# Patient Record
Sex: MALE | Race: WHITE | NOT HISPANIC OR LATINO | Employment: OTHER | URBAN - METROPOLITAN AREA
[De-identification: names, ages, dates, MRNs, and addresses within clinical notes are randomized per-mention and may not be internally consistent; named-entity substitution may affect disease eponyms.]

---

## 2017-03-30 ENCOUNTER — APPOINTMENT (OUTPATIENT)
Dept: LAB | Facility: CLINIC | Age: 82
End: 2017-03-30
Payer: MEDICARE

## 2017-03-30 ENCOUNTER — TRANSCRIBE ORDERS (OUTPATIENT)
Dept: LAB | Facility: CLINIC | Age: 82
End: 2017-03-30

## 2017-03-30 DIAGNOSIS — E78.5 HYPERLIPIDEMIA: ICD-10-CM

## 2017-03-30 DIAGNOSIS — E83.42 HYPOMAGNESEMIA: ICD-10-CM

## 2017-03-30 DIAGNOSIS — I10 ESSENTIAL (PRIMARY) HYPERTENSION: ICD-10-CM

## 2017-03-30 DIAGNOSIS — E11.9 TYPE 2 DIABETES MELLITUS WITHOUT COMPLICATIONS (HCC): ICD-10-CM

## 2017-03-30 LAB
ALBUMIN SERPL BCP-MCNC: 2.9 G/DL (ref 3.5–5)
ALP SERPL-CCNC: 79 U/L (ref 46–116)
ALT SERPL W P-5'-P-CCNC: 15 U/L (ref 12–78)
ANION GAP SERPL CALCULATED.3IONS-SCNC: 6 MMOL/L (ref 4–13)
AST SERPL W P-5'-P-CCNC: 12 U/L (ref 5–45)
BASOPHILS # BLD AUTO: 0.05 THOUSANDS/ΜL (ref 0–0.1)
BASOPHILS NFR BLD AUTO: 1 % (ref 0–1)
BILIRUB DIRECT SERPL-MCNC: 0.23 MG/DL (ref 0–0.2)
BILIRUB SERPL-MCNC: 0.81 MG/DL (ref 0.2–1)
BUN SERPL-MCNC: 20 MG/DL (ref 5–25)
CALCIUM SERPL-MCNC: 8.8 MG/DL (ref 8.3–10.1)
CHLORIDE SERPL-SCNC: 102 MMOL/L (ref 100–108)
CHOLEST SERPL-MCNC: 115 MG/DL (ref 50–200)
CO2 SERPL-SCNC: 30 MMOL/L (ref 21–32)
CREAT SERPL-MCNC: 1.11 MG/DL (ref 0.6–1.3)
EOSINOPHIL # BLD AUTO: 0.1 THOUSAND/ΜL (ref 0–0.61)
EOSINOPHIL NFR BLD AUTO: 2 % (ref 0–6)
ERYTHROCYTE [DISTWIDTH] IN BLOOD BY AUTOMATED COUNT: 14.7 % (ref 11.6–15.1)
EST. AVERAGE GLUCOSE BLD GHB EST-MCNC: 134 MG/DL
GFR SERPL CREATININE-BSD FRML MDRD: >60 ML/MIN/1.73SQ M
GLUCOSE P FAST SERPL-MCNC: 99 MG/DL (ref 65–99)
HBA1C MFR BLD: 6.3 % (ref 4.2–6.3)
HCT VFR BLD AUTO: 46.8 % (ref 36.5–49.3)
HDLC SERPL-MCNC: 55 MG/DL (ref 40–60)
HGB BLD-MCNC: 14.8 G/DL (ref 12–17)
LDLC SERPL CALC-MCNC: 50 MG/DL (ref 0–100)
LYMPHOCYTES # BLD AUTO: 1.59 THOUSANDS/ΜL (ref 0.6–4.47)
LYMPHOCYTES NFR BLD AUTO: 23 % (ref 14–44)
MAGNESIUM SERPL-MCNC: 2 MG/DL (ref 1.6–2.6)
MCH RBC QN AUTO: 25.5 PG (ref 26.8–34.3)
MCHC RBC AUTO-ENTMCNC: 31.6 G/DL (ref 31.4–37.4)
MCV RBC AUTO: 81 FL (ref 82–98)
MONOCYTES # BLD AUTO: 0.46 THOUSAND/ΜL (ref 0.17–1.22)
MONOCYTES NFR BLD AUTO: 7 % (ref 4–12)
NEUTROPHILS # BLD AUTO: 4.68 THOUSANDS/ΜL (ref 1.85–7.62)
NEUTS SEG NFR BLD AUTO: 67 % (ref 43–75)
NRBC BLD AUTO-RTO: 0 /100 WBCS
PLATELET # BLD AUTO: 171 THOUSANDS/UL (ref 149–390)
PMV BLD AUTO: 9.9 FL (ref 8.9–12.7)
POTASSIUM SERPL-SCNC: 4.2 MMOL/L (ref 3.5–5.3)
PROT SERPL-MCNC: 6.8 G/DL (ref 6.4–8.2)
RBC # BLD AUTO: 5.8 MILLION/UL (ref 3.88–5.62)
SODIUM SERPL-SCNC: 138 MMOL/L (ref 136–145)
TRIGL SERPL-MCNC: 51 MG/DL
WBC # BLD AUTO: 6.89 THOUSAND/UL (ref 4.31–10.16)

## 2017-03-30 PROCEDURE — 80061 LIPID PANEL: CPT

## 2017-03-30 PROCEDURE — 80076 HEPATIC FUNCTION PANEL: CPT

## 2017-03-30 PROCEDURE — 36415 COLL VENOUS BLD VENIPUNCTURE: CPT

## 2017-03-30 PROCEDURE — 85025 COMPLETE CBC W/AUTO DIFF WBC: CPT

## 2017-03-30 PROCEDURE — 83036 HEMOGLOBIN GLYCOSYLATED A1C: CPT

## 2017-03-30 PROCEDURE — 83735 ASSAY OF MAGNESIUM: CPT

## 2017-03-30 PROCEDURE — 80048 BASIC METABOLIC PNL TOTAL CA: CPT

## 2017-04-06 ENCOUNTER — ALLSCRIPTS OFFICE VISIT (OUTPATIENT)
Dept: OTHER | Facility: OTHER | Age: 82
End: 2017-04-06

## 2017-05-15 ENCOUNTER — GENERIC CONVERSION - ENCOUNTER (OUTPATIENT)
Dept: OTHER | Facility: OTHER | Age: 82
End: 2017-05-15

## 2017-05-23 ENCOUNTER — GENERIC CONVERSION - ENCOUNTER (OUTPATIENT)
Dept: OTHER | Facility: OTHER | Age: 82
End: 2017-05-23

## 2017-05-31 ENCOUNTER — GENERIC CONVERSION - ENCOUNTER (OUTPATIENT)
Dept: OTHER | Facility: OTHER | Age: 82
End: 2017-05-31

## 2017-06-01 ENCOUNTER — ALLSCRIPTS OFFICE VISIT (OUTPATIENT)
Dept: OTHER | Facility: OTHER | Age: 82
End: 2017-06-01

## 2017-06-05 ENCOUNTER — ALLSCRIPTS OFFICE VISIT (OUTPATIENT)
Dept: OTHER | Facility: OTHER | Age: 82
End: 2017-06-05

## 2017-06-12 ENCOUNTER — ALLSCRIPTS OFFICE VISIT (OUTPATIENT)
Dept: OTHER | Facility: OTHER | Age: 82
End: 2017-06-12

## 2017-08-06 DIAGNOSIS — E78.5 HYPERLIPIDEMIA: ICD-10-CM

## 2017-08-06 DIAGNOSIS — I10 ESSENTIAL (PRIMARY) HYPERTENSION: ICD-10-CM

## 2017-08-06 DIAGNOSIS — E83.42 HYPOMAGNESEMIA: ICD-10-CM

## 2017-08-06 DIAGNOSIS — D50.9 IRON DEFICIENCY ANEMIA: ICD-10-CM

## 2017-08-06 DIAGNOSIS — E11.9 TYPE 2 DIABETES MELLITUS WITHOUT COMPLICATIONS (HCC): ICD-10-CM

## 2017-09-18 ENCOUNTER — GENERIC CONVERSION - ENCOUNTER (OUTPATIENT)
Dept: OTHER | Facility: OTHER | Age: 82
End: 2017-09-18

## 2017-09-26 ENCOUNTER — TRANSCRIBE ORDERS (OUTPATIENT)
Dept: LAB | Facility: CLINIC | Age: 82
End: 2017-09-26

## 2017-09-26 ENCOUNTER — APPOINTMENT (OUTPATIENT)
Dept: LAB | Facility: CLINIC | Age: 82
End: 2017-09-26
Payer: MEDICARE

## 2017-09-26 DIAGNOSIS — E78.5 HYPERLIPIDEMIA: ICD-10-CM

## 2017-09-26 DIAGNOSIS — D50.9 IRON DEFICIENCY ANEMIA: ICD-10-CM

## 2017-09-26 DIAGNOSIS — I10 ESSENTIAL (PRIMARY) HYPERTENSION: ICD-10-CM

## 2017-09-26 DIAGNOSIS — E11.9 TYPE 2 DIABETES MELLITUS WITHOUT COMPLICATIONS (HCC): ICD-10-CM

## 2017-09-26 DIAGNOSIS — E83.42 HYPOMAGNESEMIA: ICD-10-CM

## 2017-09-26 LAB
ALBUMIN SERPL BCP-MCNC: 2.3 G/DL (ref 3.5–5)
ALP SERPL-CCNC: 92 U/L (ref 46–116)
ALT SERPL W P-5'-P-CCNC: 16 U/L (ref 12–78)
ANION GAP SERPL CALCULATED.3IONS-SCNC: 7 MMOL/L (ref 4–13)
AST SERPL W P-5'-P-CCNC: 19 U/L (ref 5–45)
BASOPHILS # BLD AUTO: 0.01 THOUSANDS/ΜL (ref 0–0.1)
BASOPHILS NFR BLD AUTO: 0 % (ref 0–1)
BILIRUB DIRECT SERPL-MCNC: 0.25 MG/DL (ref 0–0.2)
BILIRUB SERPL-MCNC: 0.64 MG/DL (ref 0.2–1)
BUN SERPL-MCNC: 13 MG/DL (ref 5–25)
CALCIUM SERPL-MCNC: 8.8 MG/DL (ref 8.3–10.1)
CHLORIDE SERPL-SCNC: 102 MMOL/L (ref 100–108)
CHOLEST SERPL-MCNC: 84 MG/DL (ref 50–200)
CO2 SERPL-SCNC: 28 MMOL/L (ref 21–32)
CREAT SERPL-MCNC: 1.19 MG/DL (ref 0.6–1.3)
EOSINOPHIL # BLD AUTO: 0.05 THOUSAND/ΜL (ref 0–0.61)
EOSINOPHIL NFR BLD AUTO: 1 % (ref 0–6)
ERYTHROCYTE [DISTWIDTH] IN BLOOD BY AUTOMATED COUNT: 15.2 % (ref 11.6–15.1)
EST. AVERAGE GLUCOSE BLD GHB EST-MCNC: 134 MG/DL
FERRITIN SERPL-MCNC: 81 NG/ML (ref 8–388)
GFR SERPL CREATININE-BSD FRML MDRD: 57 ML/MIN/1.73SQ M
GLUCOSE P FAST SERPL-MCNC: 106 MG/DL (ref 65–99)
HBA1C MFR BLD: 6.3 % (ref 4.2–6.3)
HCT VFR BLD AUTO: 41.6 % (ref 36.5–49.3)
HDLC SERPL-MCNC: 35 MG/DL (ref 40–60)
HGB BLD-MCNC: 13 G/DL (ref 12–17)
IRON SERPL-MCNC: 20 UG/DL (ref 65–175)
LDLC SERPL CALC-MCNC: 37 MG/DL (ref 0–100)
LYMPHOCYTES # BLD AUTO: 1.27 THOUSANDS/ΜL (ref 0.6–4.47)
LYMPHOCYTES NFR BLD AUTO: 23 % (ref 14–44)
MAGNESIUM SERPL-MCNC: 1.7 MG/DL (ref 1.6–2.6)
MCH RBC QN AUTO: 23.9 PG (ref 26.8–34.3)
MCHC RBC AUTO-ENTMCNC: 31.3 G/DL (ref 31.4–37.4)
MCV RBC AUTO: 77 FL (ref 82–98)
MONOCYTES # BLD AUTO: 0.46 THOUSAND/ΜL (ref 0.17–1.22)
MONOCYTES NFR BLD AUTO: 8 % (ref 4–12)
NEUTROPHILS # BLD AUTO: 3.83 THOUSANDS/ΜL (ref 1.85–7.62)
NEUTS SEG NFR BLD AUTO: 68 % (ref 43–75)
NRBC BLD AUTO-RTO: 0 /100 WBCS
PLATELET # BLD AUTO: 273 THOUSANDS/UL (ref 149–390)
PMV BLD AUTO: 9.6 FL (ref 8.9–12.7)
POTASSIUM SERPL-SCNC: 3.7 MMOL/L (ref 3.5–5.3)
PROT SERPL-MCNC: 7.4 G/DL (ref 6.4–8.2)
RBC # BLD AUTO: 5.44 MILLION/UL (ref 3.88–5.62)
SODIUM SERPL-SCNC: 137 MMOL/L (ref 136–145)
TRIGL SERPL-MCNC: 60 MG/DL
WBC # BLD AUTO: 5.65 THOUSAND/UL (ref 4.31–10.16)

## 2017-09-26 PROCEDURE — 83540 ASSAY OF IRON: CPT

## 2017-09-26 PROCEDURE — 85025 COMPLETE CBC W/AUTO DIFF WBC: CPT

## 2017-09-26 PROCEDURE — 36415 COLL VENOUS BLD VENIPUNCTURE: CPT

## 2017-09-26 PROCEDURE — 82728 ASSAY OF FERRITIN: CPT

## 2017-09-26 PROCEDURE — 83036 HEMOGLOBIN GLYCOSYLATED A1C: CPT

## 2017-09-26 PROCEDURE — 80048 BASIC METABOLIC PNL TOTAL CA: CPT

## 2017-09-26 PROCEDURE — 80076 HEPATIC FUNCTION PANEL: CPT

## 2017-09-26 PROCEDURE — 83735 ASSAY OF MAGNESIUM: CPT

## 2017-09-26 PROCEDURE — 80061 LIPID PANEL: CPT

## 2017-09-28 ENCOUNTER — ALLSCRIPTS OFFICE VISIT (OUTPATIENT)
Dept: OTHER | Facility: OTHER | Age: 82
End: 2017-09-28

## 2017-11-29 ENCOUNTER — APPOINTMENT (EMERGENCY)
Dept: CT IMAGING | Facility: HOSPITAL | Age: 82
DRG: 564 | End: 2017-11-29
Payer: MEDICARE

## 2017-11-29 ENCOUNTER — HOSPITAL ENCOUNTER (INPATIENT)
Facility: HOSPITAL | Age: 82
LOS: 5 days | Discharge: NURSING, REHAB OR LONG-TERM CARE | DRG: 564 | End: 2017-12-04
Attending: EMERGENCY MEDICINE | Admitting: GENERAL PRACTICE
Payer: MEDICARE

## 2017-11-29 ENCOUNTER — APPOINTMENT (INPATIENT)
Dept: RADIOLOGY | Facility: HOSPITAL | Age: 82
DRG: 564 | End: 2017-11-29
Payer: MEDICARE

## 2017-11-29 ENCOUNTER — GENERIC CONVERSION - ENCOUNTER (OUTPATIENT)
Dept: OTHER | Facility: OTHER | Age: 82
End: 2017-11-29
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
## 2017-11-29 DIAGNOSIS — M62.82 RHABDOMYOLYSIS: ICD-10-CM

## 2017-11-29 DIAGNOSIS — Z79.01 ANTICOAGULANT LONG-TERM USE: ICD-10-CM

## 2017-11-29 DIAGNOSIS — I87.2 VENOUS STASIS DERMATITIS OF LEFT LOWER EXTREMITY: Chronic | ICD-10-CM

## 2017-11-29 DIAGNOSIS — R94.31 ABNORMAL EKG: ICD-10-CM

## 2017-11-29 DIAGNOSIS — S51.812A SKIN TEAR OF LEFT FOREARM WITHOUT COMPLICATION, INITIAL ENCOUNTER: ICD-10-CM

## 2017-11-29 DIAGNOSIS — R93.89 ABNORMAL CT OF THE CHEST: ICD-10-CM

## 2017-11-29 DIAGNOSIS — R79.89 ELEVATED TSH: ICD-10-CM

## 2017-11-29 DIAGNOSIS — E80.6 HYPERBILIRUBINEMIA: ICD-10-CM

## 2017-11-29 DIAGNOSIS — T07.XXXA CONTUSION, MULTIPLE SITES: ICD-10-CM

## 2017-11-29 DIAGNOSIS — R77.8 ELEVATED TROPONIN: ICD-10-CM

## 2017-11-29 DIAGNOSIS — S60.511A ABRASION OF RIGHT HAND, INITIAL ENCOUNTER: ICD-10-CM

## 2017-11-29 DIAGNOSIS — I21.4 NSTEMI (NON-ST ELEVATED MYOCARDIAL INFARCTION) (HCC): ICD-10-CM

## 2017-11-29 DIAGNOSIS — R93.89 ABNORMAL CXR: ICD-10-CM

## 2017-11-29 DIAGNOSIS — N17.9 AKI (ACUTE KIDNEY INJURY) (HCC): ICD-10-CM

## 2017-11-29 PROBLEM — N40.0 BPH (BENIGN PROSTATIC HYPERPLASIA): Chronic | Status: ACTIVE | Noted: 2017-11-29

## 2017-11-29 PROBLEM — R06.02 SHORTNESS OF BREATH: Status: ACTIVE | Noted: 2017-11-29

## 2017-11-29 PROBLEM — R05.9 COUGH: Status: ACTIVE | Noted: 2017-11-29

## 2017-11-29 PROBLEM — E11.9 TYPE 2 DIABETES MELLITUS (HCC): Chronic | Status: ACTIVE | Noted: 2017-11-29

## 2017-11-29 PROBLEM — K21.9 GERD (GASTROESOPHAGEAL REFLUX DISEASE): Chronic | Status: ACTIVE | Noted: 2017-11-29

## 2017-11-29 PROBLEM — M81.0 OSTEOPOROSIS: Chronic | Status: ACTIVE | Noted: 2017-11-29
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
## 2017-11-29 PROBLEM — N18.9 CKD (CHRONIC KIDNEY DISEASE): Chronic | Status: ACTIVE | Noted: 2017-11-29

## 2017-11-29 PROBLEM — I48.91 ATRIAL FIBRILLATION (HCC): Chronic | Status: ACTIVE | Noted: 2017-11-29

## 2017-11-29 PROBLEM — I50.9 CONGESTIVE CARDIAC FAILURE (HCC): Chronic | Status: ACTIVE | Noted: 2017-11-29

## 2017-11-29 PROBLEM — J44.9 COPD (CHRONIC OBSTRUCTIVE PULMONARY DISEASE) (HCC): Status: ACTIVE | Noted: 2017-11-29

## 2017-11-29 LAB
ALBUMIN SERPL BCP-MCNC: 2.6 G/DL (ref 3.5–5)
ALP SERPL-CCNC: 73 U/L (ref 46–116)
ALT SERPL W P-5'-P-CCNC: 24 U/L (ref 12–78)
ANION GAP SERPL CALCULATED.3IONS-SCNC: 12 MMOL/L (ref 4–13)
APTT PPP: 37 SECONDS (ref 23–35)
AST SERPL W P-5'-P-CCNC: 82 U/L (ref 5–45)
BASOPHILS # BLD AUTO: 0.05 THOUSANDS/ΜL (ref 0–0.1)
BASOPHILS NFR BLD AUTO: 0 % (ref 0–1)
BILIRUB DIRECT SERPL-MCNC: 0.56 MG/DL (ref 0–0.2)
BILIRUB SERPL-MCNC: 2.5 MG/DL (ref 0.2–1)
BUN SERPL-MCNC: 24 MG/DL (ref 5–25)
CALCIUM SERPL-MCNC: 9.1 MG/DL (ref 8.3–10.1)
CHLORIDE SERPL-SCNC: 103 MMOL/L (ref 100–108)
CK MB SERPL-MCNC: 19.9 NG/ML (ref 0–5)
CK MB SERPL-MCNC: <1 % (ref 0–2.5)
CK SERPL-CCNC: 2573 U/L (ref 39–308)
CO2 SERPL-SCNC: 25 MMOL/L (ref 21–32)
CREAT SERPL-MCNC: 1.62 MG/DL (ref 0.6–1.3)
EOSINOPHIL # BLD AUTO: 0 THOUSAND/ΜL (ref 0–0.61)
EOSINOPHIL NFR BLD AUTO: 0 % (ref 0–6)
ERYTHROCYTE [DISTWIDTH] IN BLOOD BY AUTOMATED COUNT: 18.6 % (ref 11.6–15.1)
GFR SERPL CREATININE-BSD FRML MDRD: 39 ML/MIN/1.73SQ M
GLUCOSE SERPL-MCNC: 167 MG/DL (ref 65–140)
GLUCOSE SERPL-MCNC: 78 MG/DL (ref 65–140)
GLUCOSE SERPL-MCNC: 93 MG/DL (ref 65–140)
HCT VFR BLD AUTO: 45.8 % (ref 36.5–49.3)
HGB BLD-MCNC: 14.2 G/DL (ref 12–17)
INR PPP: 1.8 (ref 0.86–1.16)
LYMPHOCYTES # BLD AUTO: 1.02 THOUSANDS/ΜL (ref 0.6–4.47)
LYMPHOCYTES NFR BLD AUTO: 8 % (ref 14–44)
MAGNESIUM SERPL-MCNC: 1.7 MG/DL (ref 1.6–2.6)
MCH RBC QN AUTO: 22.9 PG (ref 26.8–34.3)
MCHC RBC AUTO-ENTMCNC: 31 G/DL (ref 31.4–37.4)
MCV RBC AUTO: 74 FL (ref 82–98)
MONOCYTES # BLD AUTO: 0.52 THOUSAND/ΜL (ref 0.17–1.22)
MONOCYTES NFR BLD AUTO: 4 % (ref 4–12)
NEUTROPHILS # BLD AUTO: 11.28 THOUSANDS/ΜL (ref 1.85–7.62)
NEUTS SEG NFR BLD AUTO: 87 % (ref 43–75)
NRBC BLD AUTO-RTO: 0 /100 WBCS
PLATELET # BLD AUTO: 175 THOUSANDS/UL (ref 149–390)
PLATELET # BLD AUTO: 186 THOUSANDS/UL (ref 149–390)
PMV BLD AUTO: 9.5 FL (ref 8.9–12.7)
PMV BLD AUTO: 9.8 FL (ref 8.9–12.7)
POTASSIUM SERPL-SCNC: 4 MMOL/L (ref 3.5–5.3)
PROT SERPL-MCNC: 7.1 G/DL (ref 6.4–8.2)
PROTHROMBIN TIME: 21.4 SECONDS (ref 12.1–14.4)
RBC # BLD AUTO: 6.2 MILLION/UL (ref 3.88–5.62)
SODIUM SERPL-SCNC: 140 MMOL/L (ref 136–145)
T3 SERPL-MCNC: 0.5 NG/ML (ref 0.6–1.8)
T4 FREE SERPL-MCNC: 1.02 NG/DL (ref 0.76–1.46)
TROPONIN I SERPL-MCNC: 3.02 NG/ML
TROPONIN I SERPL-MCNC: 3.58 NG/ML
TROPONIN I SERPL-MCNC: 3.62 NG/ML
TSH SERPL DL<=0.05 MIU/L-ACNC: 6.75 UIU/ML (ref 0.36–3.74)
WBC # BLD AUTO: 12.93 THOUSAND/UL (ref 4.31–10.16)

## 2017-11-29 PROCEDURE — 82550 ASSAY OF CK (CPK): CPT | Performed by: EMERGENCY MEDICINE

## 2017-11-29 PROCEDURE — 71020 HB CHEST X-RAY 2VW FRONTAL&LATL: CPT

## 2017-11-29 PROCEDURE — 99285 EMERGENCY DEPT VISIT HI MDM: CPT

## 2017-11-29 PROCEDURE — 70450 CT HEAD/BRAIN W/O DYE: CPT

## 2017-11-29 PROCEDURE — 84484 ASSAY OF TROPONIN QUANT: CPT | Performed by: NURSE PRACTITIONER

## 2017-11-29 PROCEDURE — 36415 COLL VENOUS BLD VENIPUNCTURE: CPT | Performed by: EMERGENCY MEDICINE

## 2017-11-29 PROCEDURE — 80048 BASIC METABOLIC PNL TOTAL CA: CPT | Performed by: EMERGENCY MEDICINE

## 2017-11-29 PROCEDURE — 85730 THROMBOPLASTIN TIME PARTIAL: CPT | Performed by: EMERGENCY MEDICINE

## 2017-11-29 PROCEDURE — 85025 COMPLETE CBC W/AUTO DIFF WBC: CPT | Performed by: EMERGENCY MEDICINE

## 2017-11-29 PROCEDURE — 82948 REAGENT STRIP/BLOOD GLUCOSE: CPT

## 2017-11-29 PROCEDURE — 80076 HEPATIC FUNCTION PANEL: CPT | Performed by: EMERGENCY MEDICINE

## 2017-11-29 PROCEDURE — 84439 ASSAY OF FREE THYROXINE: CPT | Performed by: EMERGENCY MEDICINE

## 2017-11-29 PROCEDURE — 84484 ASSAY OF TROPONIN QUANT: CPT | Performed by: EMERGENCY MEDICINE

## 2017-11-29 PROCEDURE — 82553 CREATINE MB FRACTION: CPT | Performed by: EMERGENCY MEDICINE

## 2017-11-29 PROCEDURE — 72125 CT NECK SPINE W/O DYE: CPT

## 2017-11-29 PROCEDURE — 85610 PROTHROMBIN TIME: CPT | Performed by: EMERGENCY MEDICINE

## 2017-11-29 PROCEDURE — 83735 ASSAY OF MAGNESIUM: CPT | Performed by: EMERGENCY MEDICINE

## 2017-11-29 PROCEDURE — 85049 AUTOMATED PLATELET COUNT: CPT | Performed by: NURSE PRACTITIONER

## 2017-11-29 PROCEDURE — 84480 ASSAY TRIIODOTHYRONINE (T3): CPT | Performed by: EMERGENCY MEDICINE

## 2017-11-29 PROCEDURE — 84443 ASSAY THYROID STIM HORMONE: CPT | Performed by: EMERGENCY MEDICINE

## 2017-11-29 PROCEDURE — 93005 ELECTROCARDIOGRAM TRACING: CPT

## 2017-11-29 RX ORDER — FUROSEMIDE 40 MG/1
40 TABLET ORAL DAILY
Status: DISCONTINUED | OUTPATIENT
Start: 2017-11-30 | End: 2017-12-04 | Stop reason: HOSPADM

## 2017-11-29 RX ORDER — METFORMIN HYDROCHLORIDE 500 MG/1
TABLET, EXTENDED RELEASE ORAL
COMMUNITY
Start: 2016-07-06 | End: 2018-05-03

## 2017-11-29 RX ORDER — TAMSULOSIN HYDROCHLORIDE 0.4 MG/1
CAPSULE ORAL
COMMUNITY
Start: 2014-10-09 | End: 2018-05-03

## 2017-11-29 RX ORDER — FUROSEMIDE 40 MG/1
TABLET ORAL
COMMUNITY
Start: 2017-07-31 | End: 2018-05-03

## 2017-11-29 RX ORDER — TAMSULOSIN HYDROCHLORIDE 0.4 MG/1
0.4 CAPSULE ORAL
Status: DISCONTINUED | OUTPATIENT
Start: 2017-11-29 | End: 2017-12-04 | Stop reason: HOSPADM

## 2017-11-29 RX ORDER — POTASSIUM CHLORIDE 20 MEQ/1
20 TABLET, EXTENDED RELEASE ORAL DAILY
Status: DISCONTINUED | OUTPATIENT
Start: 2017-11-30 | End: 2017-11-30

## 2017-11-29 RX ORDER — METOPROLOL SUCCINATE 25 MG/1
25 TABLET, EXTENDED RELEASE ORAL DAILY
Status: DISCONTINUED | OUTPATIENT
Start: 2017-11-30 | End: 2017-12-04 | Stop reason: HOSPADM

## 2017-11-29 RX ORDER — HEPARIN SODIUM 5000 [USP'U]/ML
5000 INJECTION, SOLUTION INTRAVENOUS; SUBCUTANEOUS EVERY 8 HOURS SCHEDULED
Status: DISCONTINUED | OUTPATIENT
Start: 2017-11-29 | End: 2017-11-29

## 2017-11-29 RX ORDER — LANCETS 28 GAUGE
EACH MISCELLANEOUS
COMMUNITY
Start: 2016-01-15

## 2017-11-29 RX ORDER — NICOTINE POLACRILEX 4 MG/1
GUM, CHEWING ORAL
COMMUNITY
End: 2018-05-03

## 2017-11-29 RX ORDER — SIMVASTATIN 20 MG
TABLET ORAL
COMMUNITY
Start: 2014-10-09 | End: 2018-02-12 | Stop reason: SDUPTHER

## 2017-11-29 RX ORDER — ALENDRONATE SODIUM 70 MG/1
TABLET ORAL
COMMUNITY
Start: 2014-10-20 | End: 2018-03-19 | Stop reason: SDUPTHER

## 2017-11-29 RX ORDER — MAGNESIUM CHLORIDE 71.5 G/G
TABLET ORAL
COMMUNITY
Start: 2016-11-07

## 2017-11-29 RX ORDER — UREA 10 %
250 LOTION (ML) TOPICAL
Status: DISCONTINUED | OUTPATIENT
Start: 2017-11-29 | End: 2017-12-04 | Stop reason: HOSPADM

## 2017-11-29 RX ORDER — PANTOPRAZOLE SODIUM 20 MG/1
20 TABLET, DELAYED RELEASE ORAL
Status: DISCONTINUED | OUTPATIENT
Start: 2017-11-30 | End: 2017-12-04 | Stop reason: HOSPADM

## 2017-11-29 RX ORDER — ACETAMINOPHEN 325 MG/1
650 TABLET ORAL EVERY 6 HOURS PRN
Status: DISCONTINUED | OUTPATIENT
Start: 2017-11-29 | End: 2017-12-04 | Stop reason: HOSPADM

## 2017-11-29 RX ORDER — POTASSIUM CHLORIDE 20 MEQ/1
TABLET, EXTENDED RELEASE ORAL
COMMUNITY
Start: 2015-03-16 | End: 2018-02-08 | Stop reason: SDUPTHER

## 2017-11-29 RX ORDER — METOPROLOL SUCCINATE 25 MG/1
TABLET, EXTENDED RELEASE ORAL
COMMUNITY
Start: 2015-08-17 | End: 2018-05-03

## 2017-11-29 RX ORDER — ASPIRIN 81 MG/1
324 TABLET, CHEWABLE ORAL ONCE
Status: COMPLETED | OUTPATIENT
Start: 2017-11-29 | End: 2017-11-29

## 2017-11-29 RX ORDER — SODIUM CHLORIDE 9 MG/ML
75 INJECTION, SOLUTION INTRAVENOUS CONTINUOUS
Status: DISCONTINUED | OUTPATIENT
Start: 2017-11-29 | End: 2017-11-30

## 2017-11-29 RX ORDER — PRAVASTATIN SODIUM 20 MG
20 TABLET ORAL
Status: DISCONTINUED | OUTPATIENT
Start: 2017-11-29 | End: 2017-12-04 | Stop reason: HOSPADM

## 2017-11-29 RX ORDER — SENNOSIDES 8.6 MG
1 TABLET ORAL
Status: DISCONTINUED | OUTPATIENT
Start: 2017-11-29 | End: 2017-12-04 | Stop reason: HOSPADM

## 2017-11-29 RX ADMIN — Medication 250 MG: at 18:16

## 2017-11-29 RX ADMIN — TAMSULOSIN HYDROCHLORIDE 0.4 MG: 0.4 CAPSULE ORAL at 17:34

## 2017-11-29 RX ADMIN — FLUTICASONE PROPIONATE AND SALMETEROL 1 PUFF: 50; 250 POWDER RESPIRATORY (INHALATION) at 20:19

## 2017-11-29 RX ADMIN — SODIUM CHLORIDE 1000 ML: 0.9 INJECTION, SOLUTION INTRAVENOUS at 14:51

## 2017-11-29 RX ADMIN — SODIUM CHLORIDE 75 ML/HR: 0.9 INJECTION, SOLUTION INTRAVENOUS at 17:34

## 2017-11-29 RX ADMIN — ASPIRIN 81 MG 324 MG: 81 TABLET ORAL at 14:00

## 2017-11-29 RX ADMIN — INSULIN LISPRO 1 UNITS: 100 INJECTION, SOLUTION INTRAVENOUS; SUBCUTANEOUS at 22:07

## 2017-11-29 RX ADMIN — PRAVASTATIN SODIUM 20 MG: 20 TABLET ORAL at 18:15

## 2017-11-29 RX ADMIN — SENNOSIDES 8.6 MG: 8.6 TABLET, FILM COATED ORAL at 22:00

## 2017-11-29 NOTE — CONSULTS
Consultation - Cardiology  Benoit Willis 81 y o  male MRN: [id number]  Unit/Bed#: ED 11 Encounter: [id number]    Inpatient consult to Cardiology  Consult performed by: Roscoe Donovan ordered by: Marcia Bunch          Physician Requesting Consult: Sirena Kinsey*  Reason for Consult / Principal Problem: NSTEMI    HPI: Cardiologist Dr Jessi Kim is a 81y o  year old male who has a history of CAD s/p stents, CVA, PVD, COPD, A  Fib on Xarelto, Dm, CKD2, CHF, HLD, MI presenting with significantly elevated troponin after mechanical fall last night  Patient states that he tripped on a slipper last night, denies pre-syncopal symptoms such as lightheadedness or visual changes  He was on the floor for 10-12 hours, crawling on the floor unable to get to the phone  Patient denies loss of consciousness or head trauma  Patient also admits that the has had progressive SOB and productive cough over the past week and has been weaker and slower over the past month  Denies chest pain or leg swelling  According to his family, he has baseline difficulty with daily household chores  Patient's cardiologist is Dr Audrea Shone, and he hasn't seen him in over a year        REVIEW OF SYSTEMS:  Constitutional:  +generalized weakness, +slowing activity, Denies fever or chills   Eyes:  Denies change in visual acuity   HENT:  Denies nasal congestion or sore throat   Respiratory:  +SOB, +cough  Cardiovascular:  Denies chest pain or edema   GI:  Denies abdominal pain, nausea, vomiting, bloody stools or diarrhea   :  Denies dysuria, frequency, difficulty in micturition and nocturia  Musculoskeletal:  Denies pain  Neurologic:  Denies headache, focal weakness or sensory changes   Endocrine:  Denies polyuria or polydipsia   Lymphatic:  Denies swollen glands   Psychiatric:  Denies depression or anxiety     Historical Information   Past Medical History:   Diagnosis Date    Cardiac disease     [facility]ch[facility] disease) ([facility] )     Diabetes mellitus ([facility] )     GERD (gastroesophageal reflux disease)     Hyperlipidemia     Hypertension     Renal disorder      Past Surgical History:   Procedure Laterality Date    CORONARY ANGIOPLASTY WITH STENT PLACEMENT      EYE SURGERY       History   Alcohol Use No     History   Drug Use No     History   Smoking Status    Former Smoker    Years: 20 00   Smokeless Tobacco    Never Used     Family History: History reviewed  No pertinent family history      MEDS & ALLERGIES:  all current active meds have been reviewed and current meds:   Current Facility-Administered Medications   Medication Dose Route Frequency    acetaminophen (TYLENOL) tablet 650 mg  650 mg Oral Q6H PRN    fluticasone-salmeterol (ADVAIR) 250-50 mcg/dose inhaler 1 puff  1 puff Inhalation Q12H [address]    [START ON 11/30/2017] furosemide (LASIX) tablet 40 mg  40 mg Oral Daily    insulin lispro (HumaLOG) 100 units/mL subcutaneous injection 1-5 Units  1-5 Units Subcutaneous TID AC    insulin lispro (HumaLOG) 100 units/mL subcutaneous injection 1-5 Units  1-5 Units Subcutaneous HS    magnesium gluconate (MAGONATE) tablet 250 mg  250 mg Oral BID AC    [START ON 11/30/2017] metoprolol succinate (TOPROL-XL) 24 hr tablet 25 mg  25 mg Oral Daily    [START ON 11/30/2017] pantoprazole (PROTONIX) EC tablet 20 mg  20 mg Oral Early Morning    [START ON 11/30/2017] potassium chloride (K-DUR,KLOR-CON) CR tablet 20 mEq  20 mEq Oral Daily    pravastatin (PRAVACHOL) tablet 20 mg  20 mg Oral Daily With Dinner    [START ON 11/30/2017] rivaroxaban (XARELTO) tablet 15 mg  15 mg Oral Daily With Breakfast    senna (SENOKOT) tablet 8 6 mg  1 tablet Oral HS    sodium chloride 0 9 % infusion  75 mL/hr Intravenous Continuous    tamsulosin (FLOMAX) capsule 0 4 mg  0 4 mg Oral Daily With Dinner       sodium chloride 75 mL/hr     Allergies   Allergen Reactions    Cephalexin        OBJECTIVE:  Vitals:   Vitals: 11/29/17 1451   BP: 129/69   Pulse: (!) 53   Resp: 16   Temp:    SpO2: 94%     There is no height or weight on file to calculate BMI  Systolic (77SNH), KLH:558 , Min:102 , PZK:171     Diastolic (83XOL), DMW:03, Min:54, Max:69      Intake/Output Summary (Last 24 hours) at 11/29/17 1649  Last data filed at 11/29/17 1630   Gross per 24 hour   Intake             1000 ml   Output                0 ml   Net             1000 ml     Weight (last 2 days)     None        Invasive Devices     Peripheral Intravenous Line            Peripheral IV 11/29/17 Left Antecubital less than 1 day                PHYSICAL EXAMS:  General:  Patient is not in acute distress, laying in the bed comfortably, awake, alert responding to commands  Head: Normocephalic, Atraumatic  HEENT:  Both pupils normal-size atraumatic, normocephalic, nonicteric  Neck:  JVP not raised  Trachea central  Respiratory:  Bronchovascular breathing all over the chest without any accompaniment  Cardiovascular:  S1-S2 normal without any murmur rails or rub  GI:  Abdomen soft nontender   Liver and spleen normal size  Musculoskeletal:  No edema  Integument:  No skin rashes or ulceration  Lymphatic:  No cervical or inguinal lymphadenopathy  Neurologic:  Patient is awake alert, responding to command, well-oriented to time and place and person    LABORATORY RESULTS:    Results from last 7 days  Lab Units 11/29/17  1409   CK TOTAL U/L 2,573*   TROPONIN I ng/mL 3 62*   CK MB INDEX % <1 0     CBC with diff:   Results from last 7 days  Lab Units 11/29/17  1409   WBC Thousand/uL 12 93*   HEMOGLOBIN g/dL 14 2   HEMATOCRIT % 45 8   MCV fL 74*   PLATELETS Thousands/uL 186   MCH pg 22 9*   MCHC g/dL 31 0*   RDW % 18 6*   MPV fL 9 5   NRBC AUTO /100 WBCs 0       CMP:  Results from last 7 days  Lab Units 11/29/17  1409   SODIUM mmol/L 140   POTASSIUM mmol/L 4 0   CHLORIDE mmol/L 103   CO2 mmol/L 25   ANION GAP mmol/L 12   BUN mg/dL 24   CREATININE mg/dL 1 62*   GLUCOSE RANDOM mg/dL 93   CALCIUM mg/dL 9 1   AST U/L 82*   ALT U/L 24   ALK PHOS U/L 73   TOTAL PROTEIN g/dL 7 1   ALBUMIN g/dL 2 6*   BILIRUBIN TOTAL mg/dL 2 50*   EGFR ml/min/1 73sq m 39       BMP:  Results from last 7 days  Lab Units 11/29/17  1409   SODIUM mmol/L 140   POTASSIUM mmol/L 4 0   CHLORIDE mmol/L 103   CO2 mmol/L 25   BUN mg/dL 24   CREATININE mg/dL 1 62*   GLUCOSE RANDOM mg/dL 93   CALCIUM mg/dL 9 1              Results from last 7 days  Lab Units 11/29/17  1409   MAGNESIUM mg/dL 1 7           Results from last 7 days  Lab Units 11/29/17  1409   TSH 3RD GENERATON uIU/mL 6 748*       Results from last 7 days  Lab Units 11/29/17  1409   INR  1 80*       Lipid Profile:   Lab Results   Component Value Date    CHOL 84 09/26/2017    CHOL 115 03/30/2017    CHOL 105 11/03/2016     Lab Results   Component Value Date    HDL 35 (L) 09/26/2017    HDL 55 03/30/2017    HDL 46 11/03/2016     Lab Results   Component Value Date    LDLCALC 37 09/26/2017    LDLCALC 50 03/30/2017    LDLCALC 52 11/03/2016     Lab Results   Component Value Date    TRIG 60 09/26/2017    TRIG 51 03/30/2017    TRIG 35 11/03/2016       Cardiac testing:   No results found for this or any previous visit  No results found for this or any previous visit  No procedure found  No results found for this or any previous visit  Imaging: I have personally reviewed pertinent reports  EKG reviewed personally:   A  Fib/Flutter    Telemetry reviewed personally:   A  Fib/Flutter with occassional periods of bradycardia in 40s-50s    Assessment/Plan:  1  NSTEMI type to be determined: 1st troponin 3 65, subsequent troponins pending  Continue to trend  Likely due to fall and rhabdomyolysis in light of elevated CKMB, but primary cardiac cause cannot be ruled out  Will hold off heparin drip for now due to clinical picture more convincing for rhabdomyolysis, patient will be high bleeding risk due to Xarelto and poor renal function  ECHO pending   Will assess for EF and regional wall motion abnormalities  Further recommendations after ECHO and troponin trend  IV fluids as managed per SLIM  2  A  Fib/Flutter on Xarelto: Patient is RRR on exam but EKG and tele show A  Fib/Flutter that is rate controlled  Occasional periods of bradycardia seen on tele  Rate controlled on Toprol 25 mg PO daily  Continue Xarelto for anticoagulation for now  Will hold off heparin drip  as patient is high bleeding risk and clinical picture is more consistent with rhabdomyolysis  3  CHF: ECHO already ordered  Will assess EF and regional wall motion abnormalities  Patient is not volume overloaded and does not seem to be in exacerbation  Continue lasix 40 mg PO daily, toprol 25 mg daily  No ACEI/ARB in light of elevated Cr of 1 62     4  Hx CAD s/p stents x2: Stable  No anginal symptoms at this time  Continue ASA, statin, BB    5  Hx CVA: Stable  Continue ASA, statin    6  PVD: Stable  With stent in place  Continue ASA, statin    7  HTN: Last recorded /69  Stable  Continue all antihypertensive medications  8  HLD: Continue statin    Code Status: Level 1 - Full Code    Thank you for allowing us to participate in this patient's care  This pt will follow up with Dr Елена Sparks once discharged  Counseling / Coordination of Care  Total floor / unit time spent today 35 minutes  Greater than 50% of total time was spent with the patient and / or family counseling and / or coordination of care  A description of the counseling / coordination of care: Review of history, current assessment, development of a plan  Rajni Lipscomb PA-C  11/29/2017,4:49 PM    Portions of the record may have been created with voice recognition software   Occasional wrong word or "sound a like" substitutions may have occurred due to the inherent limitations of voice recognition software   Read the chart carefully and recognize, using context, where substitutions have occurred

## 2017-11-29 NOTE — H&P
History and Physical - [facility] Internal Medicine    Patient Information: Michael Senior 81 y o  male MRN: [id number]  Unit/Bed#: ED 11 Encounter: [id number]  Admitting Physician: BHANU Hannah  PCP: Tatyana Gonzalez MD  Date of Admission:  11/29/17    Assessment/Plan:    Hospital Problem List:     Active Problems:    Type 2 diabetes mellitus ([facility] )    GERD (gastroesophageal reflux disease)    BPH (benign prostatic hyperplasia)    Atrial fibrillation (HCC)    Venous stasis dermatitis of left lower extremity    Congestive cardiac failure (HCC)    CKD (chronic kidney disease)    Acute kidney injury ([facility] )    Osteoporosis    Fall    Shortness of breath    Cough    Rhabdomyolysis    NSTEMI (non-ST elevated myocardial infarction) ([facility] )    COPD (chronic obstructive pulmonary disease) ([place] [address] )      Plan for the Primary Problem(s):  · Rhabdomyolysis   · Secondary to mechanical fall  Patient was on floor for 10-11 hours until able to get to phone to call daughter  · CK 2573 on admission  · IV hydration  Will need to be conservative as patient does have history of CHF  Last EF in 2013 noted in [facility] records was 35%   · Trend CK, repeat in AM     · Mechanical Fall  · CT Head and CT CSpine Negative  · Consult PT/OT  · Local care to abrasions noted to arms and hands  · Fall precautions  · Acute Kidney Injury  · Secondary to above  · Creatinine 1 62 on admission  · Patient does have underlying kidney disease however after looking at previous labs, baseline creatinine seems to be 1 1-1 2   · Gentle IV hydration  · Avoid nephrotoxins and hypotension  · Monitor BMP    · NSTEMI Type 2  · Secondary to fall and subsequent rhabdomyolysis and ANNIE  · Troponin 3 62 on admission, will trend  · Consult Cardiology  · Check echocardiogram  · EKG on admit Sinus bradycardia with t-wave inversions  Do not have old EKG to compare to  · Leukocytosis  · Likely secondary to dehydration, 12 93 on admission  · Afebrile  · Appears dry on exam  · Attribute this to dehydration in the setting of rhabdomyolysis   · Monitor and trend CBC    · Shortness of breath/Cough  · Patient states he has been feeling short of breath over past week with a productive cough  His voice sounds wet  Lungs are rhoncorous to auscultation  · Will check CXR   · Afebrile  · Mild leukocytosis however patient is dehydrated  · Bedside swallow eval before any PO intake  · Speech consult    · Elevated TSH  · 6 748 on admission  · Likely related to above  · Check T3/T4, follow up on result  Plan for Additional Problems:   · Atrial Fibrillation  · Sinus jonathan on admission  · Controlled on Metoprolol, continue  · Anticoagulated Xarelto, continue  Was on coumadin in past but had issues with bleeding  · Congestive heart failure  · Unsure wether systolic or diastolic  · Patient does not follow with cardiologist regularly and has not for years  · Takes PO Lasix at home  · Does not appear overloaded on exam    · Check echocardiogram      · DM Type 2  · BG check 93 on admit  · Takes metformin at home, hold  · SSI  · Diabetic diet  · Check A1C    · COPD  · No evidence of acute exacerbation  · Continue home Advair  · Monitor     · HLD  · Continue statin  · GERD  · Stable  · Continue protonix  · Stasis Dermatitis  · Patient follows with dermatology weekly  Was just seen Monday  LLE currently wrapped  Next appointment next Tuesday  · Consult wound care  · CKD   · With acute kidney injury  · Baseline creat seems to be 1 1-1 2    · CAD  · Continue statin    · BPH  · Continue Flomax  · Osteoporosis  · On alendronate at home, non formulary  VTE Prophylaxis: Rivaroxaban (Xarelto)  / sequential compression device   Code Status: FULL code  POLST: POLST form is not discussed and not completed at this time      Anticipated Length of Stay:  Patient will be admitted on an Inpatient basis with an anticipated length of stay of  Greater than 2 midnights  Justification for Hospital Stay: IV hydration, monitoring of labs, cardiology consult  Total Time for Visit, including Counseling / Coordination of Care: 1 hour  Greater than 50% of this total time spent on direct patient counseling and coordination of care  Chief Complaint:   Fall    History of Present Illness:    David Trammell is a 81 y o  male with a PMH of Afib on Xarelto, CHF, GERD, DM, Stasis dermatitis, BPH, HLD, CKD and MI with stents who presents with with a mechanical fall  The patient states that he tripped over his slipper after turning around "180 degrees" and fell  This occurred at approximately 10 pm last night  Given that he lives alone, there was no one to help him  He was unable to get to a phone until this morning but up until that point he had been "crawling" around on the floor  When he was able to get to a phone at appx 10 am this morning, he called his oldest daughter and her and her  came to his apartment and assisted him off of the floor into a chair  At this time he was brought to the hospital for further evaluation  The patient also states that he has been feeling progressively SOB over the past week and reports a some nasal congestion as well as a wet, loose cough that is productive for white sputum  Denies fevers or chills  Per the patients daughters, he has also seemed to have been progressively weak over the past week  The patient complained of some generalized "aches and pains" and "stiffness" but attributes this to being on the floor for about 10-11 hours  Denies any chest pain, abdominal pain, nausea, vomiting or diarrhea  Review of Systems:    Review of Systems   Constitutional: Negative for activity change, appetite change, chills, diaphoresis, fatigue and fever  HENT: Positive for congestion  Negative for sinus pressure and sore throat  Respiratory: Positive for cough and shortness of breath   Negative for chest tightness and wheezing  Cardiovascular: Negative for chest pain and leg swelling  Gastrointestinal: Positive for constipation  Negative for abdominal pain, blood in stool, diarrhea, nausea and vomiting  Genitourinary: Negative for decreased urine volume, difficulty urinating, dysuria, flank pain, frequency and urgency  Musculoskeletal: Negative for back pain, neck pain and neck stiffness  Skin: Positive for wound  Neurological: Positive for weakness  Negative for dizziness, syncope, speech difficulty, light-headedness and headaches  Hematological: Bruises/bleeds easily  Psychiatric/Behavioral: Negative for confusion  The patient is not nervous/anxious  Past Medical and Surgical History:     Past Medical History:   Diagnosis Date    Cardiac disease     [facility]     Diabetes mellitus ([facility] )     GERD (gastroesophageal reflux disease)     Hyperlipidemia     Hypertension     Renal disorder        Past Surgical History:   Procedure Laterality Date    CORONARY ANGIOPLASTY WITH STENT PLACEMENT      EYE SURGERY         Meds/Allergies:    Prior to Admission medications    Medication Sig Start Date End Date Taking?  Authorizing Provider   alendronate (FOSAMAX) 70 mg tablet Take by mouth 10/20/14  Yes Historical Provider, MD   fluticasone-salmeterol (ADVAIR DISKUS) 250-50 mcg/dose inhaler Inhale 10/20/14  Yes Historical Provider, MD   furosemide (LASIX) 40 mg tablet Take by mouth 7/31/17  Yes Historical Provider, MD   Lancets (FREESTYLE) lancets FreeStyle Lancets Miscellaneous  TEST 1 QD   Quantity: 1;  Refills: 5      DegMarisol Hussein ;  Start 15-Ben-2016  Active  100 Miscellaneous Box 1/15/16  Yes Historical Provider, MD   magnesium chloride-calcium (SLOW-MAG) 71 5-119 mg Take by mouth 11/7/16  Yes Historical Provider, MD   metFORMIN (GLUCOPHAGE-XR) 500 mg 24 hr tablet Take by mouth 7/6/16  Yes Historical Provider, MD   metoprolol succinate (TOPROL-XL) 25 mg 24 hr tablet Take by mouth 8/17/15  Yes Historical Provider, MD   potassium chloride (KLOR-CON M20) 20 mEq tablet Take by mouth 3/16/15  Yes Historical Provider, MD   rivaroxaban (XARELTO) 15 mg tablet Take by mouth 9/12/17  Yes Historical Provider, MD   simvastatin (ZOCOR) 20 mg tablet Take by mouth 10/9/14  Yes Historical Provider, MD   tamsulosin (FLOMAX) 0 4 mg Take by mouth 10/9/14  Yes Historical Provider, MD   Omeprazole 20 MG TBEC Take by mouth    Historical Provider, MD     I have reviewed home medications with patient personally  Allergies: Allergies   Allergen Reactions    Cephalexin        Social History:     Marital Status:    Occupation: Retired  Patient Pre-hospital Living Situation: Home independent  Patient Pre-hospital Level of Mobility: Independent  Patient Pre-hospital Diet Restrictions: None  Substance Use History:   History   Alcohol Use No     History   Smoking Status    Former Smoker    Years: 20 00   Smokeless Tobacco    Never Used     History   Drug Use No       Family History:    non-contributory    Physical Exam:     Vitals:   Blood Pressure: 129/69 (11/29/17 1451)  Pulse: (!) 53 (11/29/17 1451)  Temperature: 97 8 °F (36 6 °C) (11/29/17 1323)  Temp Source: Oral (11/29/17 1323)  Respirations: 16 (11/29/17 1451)  SpO2: 94 % (11/29/17 1451)    Physical Exam   Constitutional: He is oriented to person, place, and time  He appears well-developed and well-nourished  HENT:   Head: Normocephalic and atraumatic  Mouth/Throat: Mucous membranes are dry  Oropharyngeal exudate present  Eyes: Conjunctivae are normal  Pupils are equal, round, and reactive to light  Neck: Normal range of motion  Neck supple  No JVD present  No tracheal deviation present  No thyromegaly present  Cardiovascular: Regular rhythm  No murmur heard  Bradycardic   Pulmonary/Chest: No respiratory distress  He has no decreased breath sounds  He has no wheezes  He has rhonchi  Abdominal: Soft   Bowel sounds are normal  He exhibits no distension  There is no tenderness  Musculoskeletal: Normal range of motion  He exhibits edema (Bilateral lower extremities  )  Neurological: He is alert and oriented to person, place, and time  Skin: Skin is warm and dry  Ecchymosis noted  There is erythema  Abrasions noted to bilateral arms  Patient has dry and intact dressing to LLE  Psychiatric: He has a normal mood and affect  Nursing note and vitals reviewed  Additional Data:     Lab Results: I have personally reviewed pertinent reports  Results from last 7 days  Lab Units 11/29/17  1409   WBC Thousand/uL 12 93*   HEMOGLOBIN g/dL 14 2   HEMATOCRIT % 45 8   PLATELETS Thousands/uL 186   NEUTROS PCT % 87*   LYMPHS PCT % 8*   MONOS PCT % 4   EOS PCT % 0       Results from last 7 days  Lab Units 11/29/17  1409   SODIUM mmol/L 140   POTASSIUM mmol/L 4 0   CHLORIDE mmol/L 103   CO2 mmol/L 25   BUN mg/dL 24   CREATININE mg/dL 1 62*   CALCIUM mg/dL 9 1   TOTAL PROTEIN g/dL 7 1   BILIRUBIN TOTAL mg/dL 2 50*   ALK PHOS U/L 73   ALT U/L 24   AST U/L 82*   GLUCOSE RANDOM mg/dL 93       Results from last 7 days  Lab Units 11/29/17  1409   INR  1 80*       Imaging: I have personally reviewed pertinent reports  Ct Head Without Contrast    Result Date: 11/29/2017  Narrative: CT BRAIN - WITHOUT CONTRAST INDICATION:  Fall  COMPARISON:  None  TECHNIQUE:  CT examination of the brain was performed  In addition to axial images, coronal reformatted images were created and submitted for interpretation  Radiation dose length product (DLP) for this visit:  949 mGy-cm   This examination, like all CT scans performed in the [facility], was performed utilizing techniques to minimize radiation dose exposure, including the use of iterative reconstruction and automated exposure control  IMAGE QUALITY:  Diagnostic   FINDINGS:  PARENCHYMA:  Decreased attenuation is noted in the supratentorial white matter demonstrating an appearance most consistent with moderate microangiopathic change  No intracranial mass, mass effect or midline shift  No CT signs of acute infarction  There is no parenchymal hemorrhage  VENTRICLES AND EXTRA-AXIAL SPACES:  Enlargement of ventricles and extra-axial CSF spaces consistent with cerebral and cerebellar atrophy  VISUALIZED ORBITS AND PARANASAL SINUSES:  Unremarkable  CALVARIUM AND EXTRACRANIAL SOFT TISSUES:   Normal      Impression: No acute intracranial abnormality  Workstation performed: [id number]     Ct Cervical Spine Without Contrast    Result Date: 11/29/2017  Narrative: CT CERVICAL SPINE - WITHOUT CONTRAST INDICATION: Fall  COMPARISON: None  TECHNIQUE:  CT examination of the cervical spine was performed without intravenous contrast   Contiguous axial images were obtained  Sagittal and coronal reconstructions were performed  Radiation dose length product (DLP) for this visit:  481 mGy-cm   This examination, like all CT scans performed in the [facility], was performed utilizing techniques to minimize radiation dose exposure, including the use of iterative reconstruction and automated exposure control  IMAGE QUALITY:  Diagnostic  FINDINGS: ALIGNMENT:  Normal alignment of the cervical spine  No subluxation  VERTEBRAL BODIES:  No fracture  DEGENERATIVE CHANGES:  Mild multilevel cervical degenerative changes are noted without critical central canal stenosis  PREVERTEBRAL AND PARASPINAL SOFT TISSUES: Normal         THORACIC INLET:  Emphysema is noted at the lung apices  Impression: No cervical spine fracture or traumatic malalignment  Workstation performed: [id number]       EKG, Pathology, and Other Studies Reviewed on Admission:   · EKG: Sinus bradycardia with T wave inversions    Allscripts / Epic Records Reviewed: Yes Also reviewed V records  ** Please Note: This note has been constructed using a voice recognition system   **

## 2017-11-29 NOTE — ED PROVIDER NOTES
History  Chief Complaint   Patient presents with    Fall     Pt c/o falling last night onto carpet valarie and was there all night  Pt has abrasions on arms from crawling  Pt denies head strike but family unsure as pt lives alone  Pt on xarelto  HPI    Prior to Admission Medications   Prescriptions Last Dose Informant Patient Reported? Taking? Lancets (FREESTYLE) lancets   Yes Yes   Sig: FreeStyle Lancets Miscellaneous  TEST 1 QD   Quantity: 1;  Refills: 5      Tiff CRANE , Meseret Camilo ;  Start 15-Ben-2016  Active  100 Miscellaneous Box   Omeprazole 20 MG TBEC   Yes No   Sig: Take by mouth   alendronate (FOSAMAX) 70 mg tablet   Yes Yes   Sig: Take by mouth   fluticasone-salmeterol (ADVAIR DISKUS) 250-50 mcg/dose inhaler   Yes Yes   Sig: Inhale   furosemide (LASIX) 40 mg tablet   Yes Yes   Sig: Take by mouth   magnesium chloride-calcium (SLOW-MAG) 71 5-119 mg   Yes Yes   Sig: Take by mouth   metFORMIN (GLUCOPHAGE-XR) 500 mg 24 hr tablet   Yes Yes   Sig: Take by mouth   metoprolol succinate (TOPROL-XL) 25 mg 24 hr tablet   Yes Yes   Sig: Take by mouth   potassium chloride (KLOR-CON M20) 20 mEq tablet   Yes Yes   Sig: Take by mouth   rivaroxaban (XARELTO) 15 mg tablet   Yes Yes   Sig: Take by mouth   simvastatin (ZOCOR) 20 mg tablet   Yes Yes   Sig: Take by mouth   tamsulosin (FLOMAX) 0 4 mg   Yes Yes   Sig: Take by mouth      Facility-Administered Medications: None       Past Medical History:   Diagnosis Date    Cardiac disease     [facility] [address] [facility]     [facility]     GERD (gastroesophageal reflux disease)     Hyperlipidemia     Hypertension     Renal disorder        Past Surgical History:   Procedure Laterality Date    CORONARY ANGIOPLASTY WITH STENT PLACEMENT      EYE SURGERY         History reviewed  No pertinent family history  I have reviewed and agree with the history as documented      Social History   Substance Use Topics    Smoking status: Former Smoker     Years: 20 00    Smokeless tobacco: Never Used    Alcohol use No        Review of Systems    Physical Exam  ED Triage Vitals   Temperature Pulse Respirations Blood Pressure SpO2   11/29/17 1323 11/29/17 1323 11/29/17 1323 11/29/17 1323 11/29/17 1323   97 8 °F (36 6 °C) (!) 53 20 102/54 96 %      Temp Source Heart Rate Source Patient Position - Orthostatic VS BP Location FiO2 (%)   11/29/17 1323 11/29/17 1323 11/29/17 1323 11/29/17 1323 --   Oral Monitor Sitting Left arm       Pain Score       11/29/17 1451       No Pain           Orthostatic Vital Signs  Vitals:    11/29/17 1323 11/29/17 1451   BP: 102/54 129/69   Pulse: (!) 53 (!) 53   Patient Position - Orthostatic VS: Sitting Lying       Physical Exam   Constitutional: He is oriented to person, place, and time  He appears well-developed and well-nourished  No distress  HENT:   Head: Normocephalic and atraumatic  Mouth/Throat: Oropharynx is clear and moist    Eyes: Conjunctivae and EOM are normal  Pupils are equal, round, and reactive to light  Neck: Normal range of motion and full passive range of motion without pain  Neck supple  No spinous process tenderness and no muscular tenderness present  No tracheal deviation and normal range of motion present  Cardiovascular: Normal rate, regular rhythm, normal heart sounds and intact distal pulses  Pulmonary/Chest: Effort normal and breath sounds normal  No respiratory distress  He exhibits no tenderness, no bony tenderness, no laceration, no crepitus, no edema and no deformity  Abdominal: Soft  He exhibits no distension  There is no tenderness  Musculoskeletal: Normal range of motion  He exhibits no tenderness or deformity  Hands:  Neurological: He is alert and oriented to person, place, and time  He has normal strength  No cranial nerve deficit or sensory deficit  GCS eye subscore is 4  GCS verbal subscore is 5  GCS motor subscore is 6  Skin: Skin is warm and dry   No abrasion, no bruising, no ecchymosis and no laceration noted  Psychiatric: He has a normal mood and affect  His behavior is normal    Nursing note and vitals reviewed  ED Medications  Medications   sodium chloride 0 9 % bolus 1,000 mL (1,000 mL Intravenous New Bag 11/29/17 1451)   aspirin chewable tablet 324 mg (324 mg Oral Given 11/29/17 1400)       Diagnostic Studies  Results Reviewed     Procedure Component Value Units Date/Time    T3 [[id number]] Collected:  11/29/17 1524    Lab Status: In process Specimen:  Blood from Arm, Left Updated:  11/29/17 1526    T4, free [[id number]] Collected:  11/29/17 1526    Lab Status: In process Specimen:  Blood Updated:  11/29/17 1526    CKMB [[id number]]  (Abnormal) Collected:  11/29/17 1409    Lab Status:  Final result Specimen:  Blood from Arm, Left Updated:  11/29/17 1501     CK-MB Index <1 0 %      CK-MB FRACTION 19 9 (H) ng/mL     Hepatic function panel [[id number]]  (Abnormal) Collected:  11/29/17 1409    Lab Status:  Final result Specimen:  Blood from Arm, Left Updated:  11/29/17 1500     Total Bilirubin 2 50 (H) mg/dL      Bilirubin, Direct 0 56 (H) mg/dL      Alkaline Phosphatase 73 U/L      AST 82 (H) U/L      ALT 24 U/L      Total Protein 7 1 g/dL      Albumin 2 6 (L) g/dL     TSH [[id number]]  (Abnormal) Collected:  11/29/17 1409    Lab Status:  Final result Specimen:  Blood from Arm, Left Updated:  11/29/17 1500     TSH 3RD GENERATON 6 748 (H) uIU/mL     Narrative:         Patients undergoing fluorescein dye angiography may retain small amounts of fluorescein in the body for 48-72 hours post procedure  Samples containing fluorescein can produce falsely depressed TSH values  If the patient had this procedure,a specimen should be resubmitted post fluorescein clearance      CK Total with Reflex CKMB [[id number]]  (Abnormal) Collected:  11/29/17 1409    Lab Status:  Final result Specimen:  Blood from Arm, Left Updated:  11/29/17 1500     Total CK 2,573 (H) U/L     Magnesium [[id number]]  (Normal) Collected:  11/29/17 1409    Lab Status:  Final result Specimen:  Blood from Arm, Left Updated:  11/29/17 1500     Magnesium 1 7 mg/dL     Troponin I [[id number]]  (Abnormal) Collected:  11/29/17 1409    Lab Status:  Final result Specimen:  Blood from Arm, Left Updated:  11/29/17 1435     Troponin I 3 62 (H) ng/mL     Narrative:         [employer] Chemistry analyzer 99% cutoff is > 0 04 ng/mL in network labs    o cTnI 99% cutoff is useful only when applied to patients in the clinical setting of myocardial ischemia  o cTnI 99% cutoff should be interpreted in the context of clinical history, ECG findings and possibly cardiac imaging to establish correct diagnosis  o cTnI 99% cutoff may be suggestive but clearly not indicative of a coronary event without the clinical setting of myocardial ischemia  Basic metabolic panel [[id number]]  (Abnormal) Collected:  11/29/17 1409    Lab Status:  Final result Specimen:  Blood from Arm, Left Updated:  11/29/17 1432     Sodium 140 mmol/L      Potassium 4 0 mmol/L      Chloride 103 mmol/L      CO2 25 mmol/L      Anion Gap 12 mmol/L      BUN 24 mg/dL      Creatinine 1 62 (H) mg/dL      Glucose 93 mg/dL      Calcium 9 1 mg/dL      eGFR 39 ml/min/1 73sq m     Narrative:         National Kidney Disease Education Program recommendations are as follows:  GFR calculation is accurate only with a steady state creatinine  Chronic Kidney disease less than 60 ml/min/1 73 sq  meters  Kidney failure less than 15 ml/min/1 73 sq  meters  Protime-INR [[id number]]  (Abnormal) Collected:  11/29/17 1409    Lab Status:  Final result Specimen:  Blood from Arm, Left Updated:  11/29/17 1428     Protime 21 4 (H) seconds      INR 1 80 (H)    APTT [[id number]]  (Abnormal) Collected:  11/29/17 1409    Lab Status:  Final result Specimen:  Blood from Arm, Left Updated:  11/29/17 1428     PTT 37 (H) seconds     Narrative:          Therapeutic Heparin Range = 60-90 seconds    CBC and differential [[id number]]  (Abnormal) Collected:  11/29/17 1409    Lab Status:  Final result Specimen:  Blood from Arm, Left Updated:  11/29/17 1415     WBC 12 93 (H) Thousand/uL      RBC 6 20 (H) Million/uL      Hemoglobin 14 2 g/dL      Hematocrit 45 8 %      MCV 74 (L) fL      MCH 22 9 (L) pg      MCHC 31 0 (L) g/dL      RDW 18 6 (H) %      MPV 9 5 fL      Platelets 654 Thousands/uL      nRBC 0 /100 WBCs      Neutrophils Relative 87 (H) %      Lymphocytes Relative 8 (L) %      Monocytes Relative 4 %      Eosinophils Relative 0 %      Basophils Relative 0 %      Neutrophils Absolute 11 28 (H) Thousands/µL      Lymphocytes Absolute 1 02 Thousands/µL      Monocytes Absolute 0 52 Thousand/µL      Eosinophils Absolute 0 00 Thousand/µL      Basophils Absolute 0 05 Thousands/µL     POCT urinalysis dipstick [[id number]]     Lab Status:  No result Specimen:  Urine                  CT cervical spine without contrast   Final Result by Iwona Min MD (11/29 1442)      No cervical spine fracture or traumatic malalignment  Workstation performed: [id number]         CT head without contrast   Final Result by Iwona Min MD (11/29 1441)      No acute intracranial abnormality  Workstation performed: [id number]                    Procedures  ECG 12 Lead Documentation  Date/Time: 11/29/2017 2:15 PM  Performed by: Tomi Menendez  Authorized by: Tomi Menendez     Indications / Diagnosis:  Fall  ECG reviewed by me, the ED Provider: yes    Patient location:  ED  Previous ECG:     Previous ECG:  Unavailable  Interpretation:     Interpretation: abnormal    Quality:     Tracing quality:  Limited by artifact  Rate:     ECG rate:  55  Rhythm:     Rhythm: sinus bradycardia    Ectopy:     Ectopy: PAC    QRS:     QRS axis:  Normal    QRS intervals:   Wide  Conduction:     Conduction: abnormal      Abnormal conduction: complete RBBB    ST segments:     ST segments:  Normal  T waves:     T waves: inverted      Inverted:  II, III, aVF, V6, V5, V4, V3 and V2  Other findings:     Other findings: prolonged qTc interval    Comments:      No old EKG available for comparison  I spoke with Dr Felisha Castro, the patient's PCP who does not see an old EKG  He will try and find when they rolled system, or get one from the patient's cardiologist, who is the [facility], and send this to us    CriticalCare Time  Performed by: Teagan Mcpherson  Authorized by: Teagan Mcpherson     Critical care provider statement:     Critical care time (minutes):  45    Critical care time was exclusive of:  Separately billable procedures and treating other patients and teaching time    Critical care was necessary to treat or prevent imminent or life-threatening deterioration of the following conditions:  Cardiac failure, circulatory failure, shock, renal failure and dehydration    Critical care was time spent personally by me on the following activities:  Obtaining history from patient or surrogate, development of treatment plan with patient or surrogate, discussions with primary provider, discussions with consultants, evaluation of patient's response to treatment, examination of patient, review of old charts, re-evaluation of patient's condition, ordering and review of radiographic studies, ordering and review of laboratory studies and ordering and performing treatments and interventions    I assumed direction of critical care for this patient from another provider in my specialty: no             Phone Contacts  ED Phone Contact    ED Course  ED Course                                MDM  Number of Diagnoses or Management Options  Abnormal EKG: new and requires workup  Abrasion of right hand, initial encounter: new and requires workup  ANNIE (acute kidney injury) [facility]): new and requires workup  Anticoagulant long-term use: new and requires workup  Elevated troponin: new and requires workup  Elevated TSH: new and requires workup  Fall on same level from slipping, tripping or stumbling, initial encounter: new and requires workup  Hyperbilirubinemia: new and requires workup  Rhabdomyolysis: new and requires workup  Skin tear of left forearm without complication, initial encounter: new and requires workup  Diagnosis management comments: This is an 73-year-old male who presents here today after a fall at home  He states last night he tripped over his own feet and fell to the ground  He is uncertain of how he landed, but denies hitting his head  He was able to crawl into the bedroom and lay on the ground there  He states he is not normally able to get up without assistance if he were to fall  He denies any pain or injuries from the fall  He denies any recent infectious symptoms or trouble breathing  Family states he lives alone  He does take Xarelto  He is acting normally  ROS: Otherwise negative, unless stated as above  He is well-appearing, in no acute distress  He has multiple abrasions and areas of contusions as above  Some of the states are old, and he does not have any underlying tenderness to any of these areas  We will get imaging of his head and C-spine to evaluate for injuries from his fall, especially due to his Xarelto use  We will check lab work to evaluate for contributing factors, as well as her complications from the fall, including rhabdomyolysis, ANNIE, infection  The CT of his head and C-spine do not show any acute abnormalities  He has a mild leukocytosis  His PT and PTT are both elevated  His creatinine is 1 6, up from a baseline of about 1 0  He has a normal BUN and electrolytes  His CK is elevated to 2573  His TSH is elevated to 6 748  His troponin is elevated to 3 62  His bilirubin is elevated  There is a component of rhabdomyolysis to his lab abnormalities    This, as well as no oral intake since prior to falling last night is likely contributing to the elevated creatinine today   His troponin however is more elevated than would be expected for just ANNIE and rhabdo based on his current CK  With his abnormal EKG, this is concerning that he might have fallen because of a syncopal event or other complications of ACS, however he is denying any chest pain at any point in time  I spoke with Dr Samara Kayser, who is on-call for Cardiology  He states that even though this is slightly higher than expected, he does have a ANNIE and rhabdomyolysis which is likely contributing to it  Given the patient's lack of chest pain, he does not require any emergent intervention at this time  Given his Xarelto use, and it lingering in his system for longer than normal due to his ANNIE, would not start heparin at this time  He does not feel that there is a current intervention for Plavix  He recommends serial troponins, with additional interventions as needed based on these  I updated the patient and family on findings and plan of care, and they expressed understanding  Dr Samreen Allen was able to find an old EKG, which was faxed over  This was from 10/1/16  It showed sinus rhythm with a first degree AV block, and AR interval of 396  He did have a right bundle branch block, and T-wave inversions of II,III, AVF as well as V2 and V3  T-waves were upright in V4 through V6          Amount and/or Complexity of Data Reviewed  Clinical lab tests: ordered and reviewed  Tests in the radiology section of CPT®: ordered and reviewed  Independent visualization of images, tracings, or specimens: yes      CritCare Time    Disposition  Final diagnoses:   Fall on same level from slipping, tripping or stumbling, initial encounter   Abnormal EKG   Rhabdomyolysis   ANNIE (acute kidney injury) ([facility] [address] )   Elevated troponin - demand ischemia vs ACS   Elevated TSH   Hyperbilirubinemia   Anticoagulant long-term use   Abrasion of right hand, initial encounter   Skin tear of left forearm without complication, initial encounter Contusion, multiple sites     Time reflects when diagnosis was documented in both MDM as applicable and the Disposition within this note     Time User Action Codes Description Comment    11/29/2017  3:10 PM Flip Vanesa Matson [W01  0XXA] Fall on same level from slipping, tripping or stumbling, initial encounter     11/29/2017  3:10 PM Edwin Castelan Add [R94 31] Abnormal EKG     11/29/2017  3:10 PM Edwin Castelan Add [M62 82] Rhabdomyolysis     11/29/2017  3:11 PM Edwin Castelan Add [N17 9] ANNIE (acute kidney injury) ([facility] [address] )     11/29/2017  3:11 PM Edwin Castelan Add [R74 8] Elevated troponin     11/29/2017  3:11 PM Edwin Castelan Modify [R74 8] Elevated troponin demand ischemia vs ACS    11/29/2017  3:11 PM Edwin Castelan Add [R94 6] Elevated TSH     11/29/2017  3:11 PM Edwin Castelan Add [R17] Elevated bilirubin     11/29/2017  3:12 PM Edwin Castelan Remove [R17] Elevated bilirubin     11/29/2017  3:12 PM Edwin Castelan Add [E80 6] Hyperbilirubinemia     11/29/2017  3:12 PM Ediwn Castelan Add [Z79 01] Anticoagulant long-term use     11/29/2017  3:12 PM Edwin Castelan Add [S60 511A] Abrasion of right hand, initial encounter     11/29/2017  3:12 PM Edwin Castelan Add [Q97 166N] Skin tear of left forearm without complication, initial encounter     11/29/2017  3:13 PM Edwin Castelan Add [T07  XXXA] Contusion, multiple sites       ED Disposition     ED Disposition Condition Comment    Admit  Case was discussed with Dr Eileen Jacques and the patient's admission status was agreed to be Admission Status: inpatient status to the service of Dr Eileen Jacques  Follow-up Information    None       Patient's Medications   Discharge Prescriptions    No medications on file     No discharge procedures on file      ED Provider  Electronically Signed by           Ayan Mahmood MD  11/29/17 1535       Ayan Mahmood MD  11/29/17 5551

## 2017-11-30 ENCOUNTER — APPOINTMENT (INPATIENT)
Dept: NON INVASIVE DIAGNOSTICS | Facility: HOSPITAL | Age: 82
DRG: 564 | End: 2017-11-30
Payer: MEDICARE

## 2017-11-30 ENCOUNTER — APPOINTMENT (INPATIENT)
Dept: CT IMAGING | Facility: HOSPITAL | Age: 82
DRG: 564 | End: 2017-11-30
Payer: MEDICARE

## 2017-11-30 PROBLEM — R93.89 ABNORMAL CHEST X-RAY: Status: ACTIVE | Noted: 2017-11-30

## 2017-11-30 PROBLEM — E87.6 HYPOKALEMIA: Status: ACTIVE | Noted: 2017-11-30

## 2017-11-30 LAB
ANION GAP SERPL CALCULATED.3IONS-SCNC: 9 MMOL/L (ref 4–13)
ATRIAL RATE: 55 BPM
BACTERIA UR QL AUTO: ABNORMAL /HPF
BILIRUB UR QL STRIP: NEGATIVE
BUN SERPL-MCNC: 26 MG/DL (ref 5–25)
CALCIUM SERPL-MCNC: 8.1 MG/DL (ref 8.3–10.1)
CHLORIDE SERPL-SCNC: 105 MMOL/L (ref 100–108)
CK MB SERPL-MCNC: 5.1 NG/ML (ref 0–5)
CK MB SERPL-MCNC: <1 % (ref 0–2.5)
CK SERPL-CCNC: 1084 U/L (ref 39–308)
CLARITY UR: CLEAR
CO2 SERPL-SCNC: 24 MMOL/L (ref 21–32)
COLOR UR: YELLOW
CREAT SERPL-MCNC: 1.34 MG/DL (ref 0.6–1.3)
ERYTHROCYTE [DISTWIDTH] IN BLOOD BY AUTOMATED COUNT: 18 % (ref 11.6–15.1)
EST. AVERAGE GLUCOSE BLD GHB EST-MCNC: 123 MG/DL
GFR SERPL CREATININE-BSD FRML MDRD: 49 ML/MIN/1.73SQ M
GLUCOSE SERPL-MCNC: 109 MG/DL (ref 65–140)
GLUCOSE SERPL-MCNC: 112 MG/DL (ref 65–140)
GLUCOSE SERPL-MCNC: 119 MG/DL (ref 65–140)
GLUCOSE SERPL-MCNC: 82 MG/DL (ref 65–140)
GLUCOSE SERPL-MCNC: 90 MG/DL (ref 65–140)
GLUCOSE UR STRIP-MCNC: NEGATIVE MG/DL
HBA1C MFR BLD: 5.9 % (ref 4.2–6.3)
HCT VFR BLD AUTO: 39.9 % (ref 36.5–49.3)
HGB BLD-MCNC: 12.4 G/DL (ref 12–17)
HGB UR QL STRIP.AUTO: ABNORMAL
KETONES UR STRIP-MCNC: NEGATIVE MG/DL
LEUKOCYTE ESTERASE UR QL STRIP: ABNORMAL
MCH RBC QN AUTO: 22.9 PG (ref 26.8–34.3)
MCHC RBC AUTO-ENTMCNC: 31.1 G/DL (ref 31.4–37.4)
MCV RBC AUTO: 74 FL (ref 82–98)
NITRITE UR QL STRIP: NEGATIVE
NON-SQ EPI CELLS URNS QL MICRO: ABNORMAL /HPF
PH UR STRIP.AUTO: 5.5 [PH] (ref 4.5–8)
PLATELET # BLD AUTO: 150 THOUSANDS/UL (ref 149–390)
PMV BLD AUTO: 8.9 FL (ref 8.9–12.7)
POTASSIUM SERPL-SCNC: 3.3 MMOL/L (ref 3.5–5.3)
PR INTERVAL: 134 MS
PROT UR STRIP-MCNC: NEGATIVE MG/DL
QRS AXIS: 91 DEGREES
QRSD INTERVAL: 120 MS
QT INTERVAL: 560 MS
QTC INTERVAL: 535 MS
RBC # BLD AUTO: 5.41 MILLION/UL (ref 3.88–5.62)
RBC #/AREA URNS AUTO: ABNORMAL /HPF
SODIUM SERPL-SCNC: 138 MMOL/L (ref 136–145)
SP GR UR STRIP.AUTO: 1.02 (ref 1–1.03)
T WAVE AXIS: 262 DEGREES
TROPONIN I SERPL-MCNC: 2.37 NG/ML
UROBILINOGEN UR QL STRIP.AUTO: 0.2 E.U./DL
VENTRICULAR RATE: 55 BPM
WBC # BLD AUTO: 10.85 THOUSAND/UL (ref 4.31–10.16)
WBC #/AREA URNS AUTO: ABNORMAL /HPF

## 2017-11-30 PROCEDURE — 82553 CREATINE MB FRACTION: CPT | Performed by: NURSE PRACTITIONER

## 2017-11-30 PROCEDURE — 82948 REAGENT STRIP/BLOOD GLUCOSE: CPT

## 2017-11-30 PROCEDURE — 80048 BASIC METABOLIC PNL TOTAL CA: CPT | Performed by: NURSE PRACTITIONER

## 2017-11-30 PROCEDURE — 84484 ASSAY OF TROPONIN QUANT: CPT | Performed by: NURSE PRACTITIONER

## 2017-11-30 PROCEDURE — 83036 HEMOGLOBIN GLYCOSYLATED A1C: CPT | Performed by: NURSE PRACTITIONER

## 2017-11-30 PROCEDURE — 94760 N-INVAS EAR/PLS OXIMETRY 1: CPT

## 2017-11-30 PROCEDURE — G8987 SELF CARE CURRENT STATUS: HCPCS

## 2017-11-30 PROCEDURE — 85027 COMPLETE CBC AUTOMATED: CPT | Performed by: NURSE PRACTITIONER

## 2017-11-30 PROCEDURE — 71250 CT THORAX DX C-: CPT

## 2017-11-30 PROCEDURE — 81001 URINALYSIS AUTO W/SCOPE: CPT | Performed by: GENERAL PRACTICE

## 2017-11-30 PROCEDURE — 94668 MNPJ CHEST WALL SBSQ: CPT

## 2017-11-30 PROCEDURE — 93306 TTE W/DOPPLER COMPLETE: CPT

## 2017-11-30 PROCEDURE — 82550 ASSAY OF CK (CPK): CPT | Performed by: NURSE PRACTITIONER

## 2017-11-30 PROCEDURE — G8988 SELF CARE GOAL STATUS: HCPCS

## 2017-11-30 PROCEDURE — 94640 AIRWAY INHALATION TREATMENT: CPT

## 2017-11-30 PROCEDURE — 97167 OT EVAL HIGH COMPLEX 60 MIN: CPT

## 2017-11-30 RX ORDER — DOXYCYCLINE HYCLATE 100 MG/1
100 CAPSULE ORAL EVERY 12 HOURS SCHEDULED
Status: DISCONTINUED | OUTPATIENT
Start: 2017-11-30 | End: 2017-12-04 | Stop reason: HOSPADM

## 2017-11-30 RX ORDER — IPRATROPIUM BROMIDE AND ALBUTEROL SULFATE 2.5; .5 MG/3ML; MG/3ML
3 SOLUTION RESPIRATORY (INHALATION) EVERY 4 HOURS PRN
Status: DISCONTINUED | OUTPATIENT
Start: 2017-11-30 | End: 2017-12-04 | Stop reason: HOSPADM

## 2017-11-30 RX ORDER — GUAIFENESIN 600 MG
600 TABLET, EXTENDED RELEASE 12 HR ORAL EVERY 12 HOURS SCHEDULED
Status: DISCONTINUED | OUTPATIENT
Start: 2017-11-30 | End: 2017-12-04

## 2017-11-30 RX ORDER — LEVALBUTEROL 1.25 MG/.5ML
1.25 SOLUTION, CONCENTRATE RESPIRATORY (INHALATION)
Status: DISCONTINUED | OUTPATIENT
Start: 2017-11-30 | End: 2017-12-04 | Stop reason: HOSPADM

## 2017-11-30 RX ORDER — POTASSIUM CHLORIDE 20 MEQ/1
40 TABLET, EXTENDED RELEASE ORAL 2 TIMES DAILY WITH MEALS
Status: DISCONTINUED | OUTPATIENT
Start: 2017-11-30 | End: 2017-12-01

## 2017-11-30 RX ADMIN — PANTOPRAZOLE SODIUM 20 MG: 20 TABLET, DELAYED RELEASE ORAL at 06:28

## 2017-11-30 RX ADMIN — LEVALBUTEROL HYDROCHLORIDE 1.25 MG: 1.25 SOLUTION, CONCENTRATE RESPIRATORY (INHALATION) at 14:53

## 2017-11-30 RX ADMIN — TAMSULOSIN HYDROCHLORIDE 0.4 MG: 0.4 CAPSULE ORAL at 15:50

## 2017-11-30 RX ADMIN — IPRATROPIUM BROMIDE 0.5 MG: 0.5 SOLUTION RESPIRATORY (INHALATION) at 20:28

## 2017-11-30 RX ADMIN — POTASSIUM CHLORIDE 40 MEQ: 1500 TABLET, EXTENDED RELEASE ORAL at 08:49

## 2017-11-30 RX ADMIN — FLUTICASONE PROPIONATE AND SALMETEROL 1 PUFF: 50; 250 POWDER RESPIRATORY (INHALATION) at 20:41

## 2017-11-30 RX ADMIN — FLUTICASONE PROPIONATE AND SALMETEROL 1 PUFF: 50; 250 POWDER RESPIRATORY (INHALATION) at 08:50

## 2017-11-30 RX ADMIN — Medication 250 MG: at 15:49

## 2017-11-30 RX ADMIN — DOXYCYCLINE 100 MG: 100 CAPSULE ORAL at 20:41

## 2017-11-30 RX ADMIN — PRAVASTATIN SODIUM 20 MG: 20 TABLET ORAL at 15:50

## 2017-11-30 RX ADMIN — RIVAROXABAN 15 MG: 15 TABLET, FILM COATED ORAL at 15:50

## 2017-11-30 RX ADMIN — DOXYCYCLINE 100 MG: 100 CAPSULE ORAL at 08:49

## 2017-11-30 RX ADMIN — SENNOSIDES 8.6 MG: 8.6 TABLET, FILM COATED ORAL at 20:42

## 2017-11-30 RX ADMIN — LEVALBUTEROL HYDROCHLORIDE 1.25 MG: 1.25 SOLUTION, CONCENTRATE RESPIRATORY (INHALATION) at 20:28

## 2017-11-30 RX ADMIN — Medication 250 MG: at 06:28

## 2017-11-30 RX ADMIN — IPRATROPIUM BROMIDE 0.5 MG: 0.5 SOLUTION RESPIRATORY (INHALATION) at 14:53

## 2017-11-30 RX ADMIN — POTASSIUM CHLORIDE 40 MEQ: 1500 TABLET, EXTENDED RELEASE ORAL at 15:49

## 2017-11-30 RX ADMIN — GUAIFENESIN 600 MG: 600 TABLET, EXTENDED RELEASE ORAL at 20:42

## 2017-11-30 RX ADMIN — FUROSEMIDE 40 MG: 40 TABLET ORAL at 08:49

## 2017-11-30 RX ADMIN — GUAIFENESIN 600 MG: 600 TABLET, EXTENDED RELEASE ORAL at 15:47

## 2017-11-30 RX ADMIN — METOPROLOL SUCCINATE 25 MG: 25 TABLET, FILM COATED, EXTENDED RELEASE ORAL at 08:49

## 2017-11-30 NOTE — CONSULTS
Consultation - Pulmonary Medicine   Magdy Pulse 81 y o  male MRN: [id number]  Unit/Bed#: -01 Encounter: [id number]      Assessment:  1  Community-acquired pneumonia  2  Abnormal CT of chest - bilateral ground-glass infiltrates with multiple right-sided pulmonary nodules measuring up to 2 4 cm   3  COPD without exacerbation  4  Rhabdomyolysis with ANNIE  5  Elevated troponin    Plan:   1  Community-acquired pneumonia with Abnormal CT of chest - bilateral ground-glass infiltrates with multiple right-sided pulmonary nodules measuring up to 2 4 cm   - currently saturating 96% on room air  - reviewed CT of chest with evidence of multi lobar pneumonia, given no hospitalizations recently will treat as community-acquired  Asked patient about "allergy" to Keflex and patient does not recall ever having any drug reactions  States he is not allergic to penicillins  States he is not sure why this is in the chart  Will contact PCP, Dr Asad Bledsoe prior to initiating ceftriaxone in combination with the doxycycline  (would be hesitant to start Levaquin given renal function)  - will add in flutter valve and incentive spirometer given rhonchorous exam  - add in Mucinex  - add in regular nebulizer treatments with ipratropium and albuterol t i d   - large spiculated solid tissue masses in the right lung are concerning for a metastatic process, would need repeat CT of chest in 6 weeks to see if these improve with IV antibiotics  2   COPD without exacerbation   - continue Advair 1 puff twice daily  - nebulizers as above  - continue to monitor off steroids  3  Rhabdomyolysis with acute kidney injury - improving  - management per PCP  4    Elevated troponin  - continue to trend  - cardiology following    History of Present Illness   Physician Requesting Consult: Anna Marie Granger MD  Reason for Consult / Principal Problem:  Abnormal chest x-ray  Hx and PE limited by:  None; patient's daughter present at bedside  HPI: Emerson Portillo Arvind Hudr is a 81y o  year old male former smoker who quit at the age of 60 with greater than a 79 pack year smoking history with significant past medical history of COPD, diabetes mellitus, GERD, hypertension, hyperlipidemia who presents to the ED status post fall  Patient reports he tripped and fell over his feet 2 nights ago and fell on the kitchen floor  Patient was able to crawl to the telephone and call his daughter after 10-12 hours of lying on the ground  Daughter and her  took the patient to the ED via car  In the ED, CT of head was completed and negative  Chest x-ray also was completed giving increased shortness of breath/increased productive cough over the last few weeks which revealed an abnormal mass in the lung  CT of chest was then completed and demonstrated bilateral ground-glass infiltrates concerning for multi lobar pneumonia with multiple right-sided pulmonary nodules concerning for pulmonary neoplasm versus metastatic lesion versus infectious process  Patient was started on doxycycline by primary team   Pulmonary was consulted for further evaluation  Inpatient consult to Pulmonology  Consult performed by: Margret Hardy ordered by: Apple Flynn          Review of Systems   Constitutional: Positive for activity change  Negative for appetite change, chills, fatigue, fever and unexpected weight change  HENT: Positive for congestion  Eyes: Positive for visual disturbance  Respiratory: Positive for cough, shortness of breath and wheezing  Cardiovascular: Positive for leg swelling  Negative for chest pain  Gastrointestinal: Negative for abdominal pain, nausea and vomiting  Musculoskeletal: Negative for back pain  Skin: Positive for wound  Negative for rash  Neurological: Negative for dizziness and light-headedness  Hematological: Negative for adenopathy  Psychiatric/Behavioral: Negative for agitation and behavioral problems         Historical Information   Past Medical History:   Diagnosis Date    Cardiac disease     [facility]     Diabetes mellitus [facility] )     GERD (gastroesophageal reflux disease)     Hyperlipidemia     Hypertension     Renal disorder      Past Surgical History:   Procedure Laterality Date    CORONARY ANGIOPLASTY WITH STENT PLACEMENT      EYE SURGERY       Social History   History   Alcohol Use No     History   Drug Use No     History   Smoking Status    Former Smoker    Packs/day: 2 00    Quit date: 11/29/1964   Smokeless Tobacco    Never Used     Occupational History:     Family History: non-contributory    Meds/Allergies   all current active meds have been reviewed    Allergies   Allergen Reactions    Cephalexin        Objective   Vitals: Blood pressure 158/77, pulse 66, temperature (!) 97 4 °F (36 3 °C), temperature source Oral, resp  rate 20, height 5' 9" (1 753 m), weight 79 7 kg (175 lb 11 3 oz), SpO2 96 %  ,Body mass index is 25 95 kg/m²  Intake/Output Summary (Last 24 hours) at 11/30/17 1308  Last data filed at 11/30/17 0900   Gross per 24 hour   Intake             1480 ml   Output                0 ml   Net             1480 ml     Invasive Devices     Peripheral Intravenous Line            Peripheral IV 11/29/17 Left Antecubital less than 1 day                Physical Exam   Constitutional:   Chronically ill-appearing, no acute distress   HENT:   Head: Normocephalic and atraumatic  Neck: Normal range of motion  Cardiovascular: Normal rate and regular rhythm  Pulmonary/Chest: Effort normal  No respiratory distress  Bilateral expiratory rhonchi and occasional mild expiratory wheeze   Abdominal: Soft  There is no tenderness  Musculoskeletal:   Bilateral lower extremity edema with chronic venous stasis changes   Lymphadenopathy:     He has no cervical adenopathy  Neurological: He is alert  Skin: Skin is warm and dry     Dressing on left lower extremity clean and intact   Psychiatric: He has a normal mood and affect  His behavior is normal        Lab Results:   I have personally reviewed pertinent lab results  , BNP: No results found for: BNP, CBC:   Lab Results   Component Value Date    WBC 10 85 (H) 11/30/2017    HGB 12 4 11/30/2017    HCT 39 9 11/30/2017    MCV 74 (L) 11/30/2017     11/30/2017    MCH 22 9 (L) 11/30/2017    MCHC 31 1 (L) 11/30/2017    RDW 18 0 (H) 11/30/2017    MPV 8 9 11/30/2017    NRBC 0 11/29/2017   , CMP:   Lab Results   Component Value Date     11/30/2017    K 3 3 (L) 11/30/2017     11/30/2017    CO2 24 11/30/2017    ANIONGAP 9 11/30/2017    BUN 26 (H) 11/30/2017    CREATININE 1 34 (H) 11/30/2017    GLUCOSE 90 11/30/2017    CALCIUM 8 1 (L) 11/30/2017    AST 82 (H) 11/29/2017    ALT 24 11/29/2017    ALKPHOS 73 11/29/2017    PROT 7 1 11/29/2017    ALBUMIN 2 6 (L) 11/29/2017    BILITOT 2 50 (H) 11/29/2017    EGFR 49 11/30/2017   , PT/INR:   Lab Results   Component Value Date    INR 1 80 (H) 11/29/2017   , Troponin:   Lab Results   Component Value Date    TROPONINI 2 37 (H) 11/30/2017     Imaging Studies: I have personally reviewed pertinent reports  EKG, Pathology, and Other Studies: I have personally reviewed pertinent reports  VTE Prophylaxis:  Xarelto    Code Status: Level 1 - Full Code  Advance Directive and Living Will:      Power of :    POLST:      Counseling/Coordination of Care: Total floor / unit time spent today Forty minutes  Greater than 50% of total time was spent with the patient and / or family counseling and / or coordination of care   A description of the counseling / coordination of care: Discussion with attending physician, review of diagnostic studies with patient, review of diagnostic studies with family, clinical impressions and treatment options/plan, discussion with PCP

## 2017-11-30 NOTE — PLAN OF CARE
Problem: OCCUPATIONAL THERAPY ADULT  Goal: Performs self-care activities at highest level of function for planned discharge setting  See evaluation for individualized goals  Treatment Interventions: ADL retraining, Functional transfer training, UE strengthening/ROM, Endurance training, Patient/family training, Equipment evaluation/education, Compensatory technique education, Continued evaluation, Energy conservation, Activityengagement          See flowsheet documentation for full assessment, interventions and recommendations  Limitation: Decreased ADL status, Decreased UE strength, Decreased UE ROM, Decreased Safe judgement during ADL, Decreased endurance, Decreased self-care trans, Decreased high-level ADLs  Prognosis: Good  Assessment: Patient is a 81 y o  male seen for OT evaluation s/p admit to [id number] [facility] on 11/29/2017 w/Rhabdomyolysis  Patient sustained a mechanical fall at home, was unable to call for assistance and was on the floor 10-12 hours, managed to crawl around and call daughter at [address] [facility] affecting patient's functional performance at time of assessment include:Cardiac Disease, COPD, GERD, Hyperlipidemia, Hypertension, Renal Disorder  Prior to admission, Patient reporting independent with ADLs, manages medications on his own, may cook a light meal/  orders take out, ambulates with walker  Patient lives in a second floor apt, at [facility], no steps to enter  Patient does not drive, daughters assist with transportation and shopping    Personal factors affecting patient at time of initial evaluation include: limited caregiver support, decreased initiation and engagement, difficulty performing ADLs and difficulty performing IADLs  Upon evaluation, patient requires minimal  and moderate assist for UB ADLs, maximal assist for LB ADLs, transfers and functional ambulation in room and bathroom with minimal  and moderate assist, with the use of Rolling Walker   Patient did not tolerate walking to bathroom, due to poor endurance, frequent coughing and generalized weakness  Occupational performance is affected by the following deficits: decreased functional use of BUEs, limited active ROM, decreased muscle strength, degenerative arthritic joint changes, dynamic sit/ stand balance deficit with poor standing tolerance time for self care and functional mobility, decreased activity tolerance, decreased safety awareness and postural control and postural alignment deficit, requiring external assistance to complete transitional movements  Therapist completed  extensive additional review of medical records and additional review of physical, cognitive or psychosocial history, clinical examination identifying 5 or more performance deficits, clinical decision making of a high complexity , consistent with a high complexity level evaluation  Patient to benefit from continued Occupational Therapy treatment while in the hospital to address deficits as defined above and maximize level of functional independence with ADLs and functional mobility        OT Discharge Recommendation: Short Term Rehab  OT - OK to Discharge: Yes (STR when medically cleared)

## 2017-11-30 NOTE — CASE MANAGEMENT
Initial Clinical Review    Admission: Date/Time/Statement: 11/29/17 @ 1519     Orders Placed This Encounter   Procedures    Inpatient Admission (expected length of stay for this patient is greater than two midnights)     Standing Status:   Standing     Number of Occurrences:   1     Order Specific Question:   Admitting Physician     Answer:   Jose A Whitaker [[id number]]     Order Specific Question:   Level of Care     Answer:   Med Surg [16]     Order Specific Question:   Estimated length of stay     Answer:   More than 2 Midnights     Order Specific Question:   Certification     Answer:   I certify that inpatient services are medically necessary for this patient for a duration of greater than two midnights  See H&P and MD Progress Notes for additional information about the patient's course of treatment  ED: Date/Time/Mode of Arrival:   ED Arrival Information     Expected Arrival Acuity Means of Arrival Escorted By Service Admission Type    - 11/29/2017 13:07 Emergent [address]  [facility] Emergency    Arrival Complaint    Fall (Cardiac pt)          Chief Complaint:   Chief Complaint   Patient presents with    Fall     Pt c/o falling last night onto carpet valarie and was there all night  Pt has abrasions on arms from crawling  Pt denies head strike but family unsure as pt lives alone  Pt on xarelto  History of Illness: Bindu Larios is a 81 y o  male with a PMH of Afib on Xarelto, CHF, GERD, DM, Stasis dermatitis, BPH, HLD, CKD and MI with stents who presents with with a mechanical fall  The patient states that he tripped over his slipper after turning around "180 degrees" and fell  This occurred at approximately 10 pm last night  Given that he lives alone, there was no one to help him  He was unable to get to a phone until this morning but up until that point he had been "crawling" around on the floor    When he was able to get to a phone at appx 10 am this morning, he called his oldest daughter and her and her  came to his apartment and assisted him off of the floor into a chair  At this time he was brought to the hospital for further evaluation  The patient also states that he has been feeling progressively SOB over the past week and reports a some nasal congestion as well as a wet, loose cough that is productive for white sputum  Denies fevers or chills  Per the patients daughters, he has also seemed to have been progressively weak over the past week  The patient complained of some generalized "aches and pains" and "stiffness" but attributes this to being on the floor for about 10-11 hours  Denies any chest pain, abdominal pain, nausea, vomiting or diarrhea             ED Vital Signs:   ED Triage Vitals   Temperature Pulse Respirations Blood Pressure SpO2   11/29/17 1323 11/29/17 1323 11/29/17 1323 11/29/17 1323 11/29/17 1323   97 8 °F (36 6 °C) (!) 53 20 102/54 96 %      Temp Source Heart Rate Source Patient Position - Orthostatic VS BP Location FiO2 (%)   11/29/17 1323 11/29/17 1323 11/29/17 1323 11/29/17 1323 --   Oral Monitor Sitting Left arm       Pain Score       11/29/17 1451       No Pain        Wt Readings from Last 1 Encounters:   11/30/17 79 7 kg (175 lb 11 3 oz)       Vital Signs (abnormal): Abnormal Labs/Diagnostic Test Results:trop   2 37, tro p3 02, T3 0 50, T3 0 50, trop  3 58, total bili  2 50, direct bili  0 56, ast 82, alb  2 6, tsh  6 748, total ck  2573, trop  3 62, BUN creat   24 1 62, pt inr   21 4  1 80, ptt 37, wbc 12 93  CXR -    Ovoid 3 6 cm density in the right upper lobe may be infectious or neoplastic  Infiltrate or atelectasis in the right lung base  Small pleural effusions larger on the left than right    Cardiomegaly with mild vascular congestion but without significant interstitial edema      CT head- wnl   CT cervical spine- wnl   ED Treatment:   Medication Administration from 11/29/2017 1307 to 11/29/2017 2050 Date/Time Order Dose Route Action Action by Comments     11/29/2017 1630 sodium chloride 0 9 % bolus 1,000 mL 0 mL Intravenous Stopped Cindi Beavers RN      11/29/2017 1451 sodium chloride 0 9 % bolus 1,000 mL 1,000 mL Intravenous Shauna 37 Cindi Beavers RN      11/29/2017 1400 aspirin chewable tablet 324 mg 324 mg Oral Given Cindi Beavers RN      11/29/2017 2019 fluticasone-salmeterol (ADVAIR) 250-50 mcg/dose inhaler 1 puff 1 puff Inhalation Given Odalis Estrada RN      11/29/2017 1816 magnesium gluconate (MAGONATE) tablet 250 mg 250 mg Oral Given Cindi Beavers RN      11/29/2017 1815 pravastatin (PRAVACHOL) tablet 20 mg 20 mg Oral Given Cindi Beavers RN      11/29/2017 1734 tamsulosin (FLOMAX) capsule 0 4 mg 0 4 mg Oral Given Cindi Beavers RN      11/29/2017 1734 sodium chloride 0 9 % infusion 75 mL/hr Intravenous Shauna 37 Cindi Beavers, [facility]      11/29/2017 1650 heparin (porcine) subcutaneous injection 5,000 Units   Subcutaneous Canceled Entry Cindi Beavers RN      11/29/2017 1821 insulin lispro (HumaLOG) 100 units/mL subcutaneous injection 1-5 Units 1 Units Subcutaneous Not Given Cindi Beavers RN           Past Medical/Surgical History: Active Ambulatory Problems     Diagnosis Date Noted    No Active Ambulatory Problems     Resolved Ambulatory Problems     Diagnosis Date Noted    No Resolved Ambulatory Problems     Past Medical History:   Diagnosis Date    Cardiac disease     COPD [facility]chronic obstructive pulmonary disease) ([facility] )     Diabetes mellitus ([place] [address] )     GERD (gastroesophageal reflux disease)     Hyperlipidemia     Hypertension     Renal disorder        Admitting Diagnosis: Rhabdomyolysis [M62 82]  Hyperbilirubinemia [E80 6]  Arm pain [M79 603]  Contusion, multiple sites [T07  XXXA]  Abnormal EKG [R94 31]  Elevated TSH [R94 6]  Elevated troponin [R74 8]  NSTEMI (non-ST elevated myocardial infarction) ([facility] ) [I21 4]  Anticoagulant long-term use [Z79 01]  ANNIE (acute kidney injury) ([facility] ) [N17 9]  Abrasion of right hand, initial encounter Patito Montanez  Fall on same level from slipping, tripping or stumbling, initial encounter [W01  0XXA]  Venous stasis dermatitis of left lower extremity [I87 2]  Skin tear of left forearm without complication, initial encounter [S51 292A]    Age/Sex: 81 y o  male    Assessment/Plan: Hospital Problem List:      Active Problems:    Type 2 diabetes mellitus (HCC)    GERD (gastroesophageal reflux disease)    BPH (benign prostatic hyperplasia)    Atrial fibrillation (HCC)    Venous stasis dermatitis of left lower extremity    Congestive cardiac failure (HCC)    CKD (chronic kidney disease)    Acute kidney injury ([facility] )    Osteoporosis    Fall    Shortness of breath    Cough    Rhabdomyolysis    NSTEMI (non-ST elevated myocardial infarction) ([facility])    COPD (chronic obstructive pulmonary disease) ([facility])   Plan for the Primary Problem(s):  · Rhabdomyolysis   ? Secondary to mechanical fall  Patient was on floor for 10-11 hours until able to get to phone to call daughter  ? CK 2573 on admission  ? IV hydration  Will need to be conservative as patient does have history of CHF  Last EF in 2013 noted in [facility] records was 35%   ? Trend CK, repeat in AM   · Mechanical Fall  ? CT Head and CT CSpine Negative  ? Consult PT/OT  ? Local care to abrasions noted to arms and hands  ? Fall precautions     · Acute Kidney Injury  ? Secondary to above  ? Creatinine 1 62 on admission  ? Patient does have underlying kidney disease however after looking at previous labs, baseline creatinine seems to be 1 1-1 2   ? Gentle IV hydration  ? Avoid nephrotoxins and hypotension  ? Monitor BMP   · NSTEMI Type 2  ? Secondary to fall and subsequent rhabdomyolysis and ANNIE  ? Troponin 3 62 on admission, will trend  ? Consult Cardiology  ? Check echocardiogram  ? EKG on admit Sinus bradycardia with t-wave inversions  Do not have old EKG to compare to   Leukocytosis  ? Likely secondary to dehydration, 12 93 on admission  ? Afebrile  ? Appears dry on exam  ? Attribute this to dehydration in the setting of rhabdomyolysis   ? Monitor and trend CBC   · Shortness of breath/Cough  ? Patient states he has been feeling short of breath over past week with a productive cough  His voice sounds wet  Lungs are rhoncorous to auscultation  ? Will check CXR   ? Afebrile  ? Mild leukocytosis however patient is dehydrated  ? Bedside swallow eval before any PO intake  ? Speech consult   · Elevated TSH  ? 6 748 on admission  ? Likely related to above  ? Check T3/T4, follow up on result     Plan for Additional Problems:   · Atrial Fibrillation  ? Sinus jonathan on admission  ? Controlled on Metoprolol, continue  ? Anticoagulated Xarelto, continue  Was on coumadin in past but had issues with bleeding     · Congestive heart failure  ? Unsure wether systolic or diastolic  ? Patient does not follow with cardiologist regularly and has not for years  ? Takes PO Lasix at home  ? Does not appear overloaded on exam    ? Check echocardiogram     · DM Type 2  ? BG check 93 on admit  ? Takes metformin at home, hold  ? SSI  ? Diabetic diet  ? Check A1C   · COPD  ? No evidence of acute exacerbation  ? Continue home Advair  ? Monitor    · HLD  ? Continue statin     · GERD  ? Stable  ? Continue protonix     · Stasis Dermatitis  ? Patient follows with dermatology weekly  Was just seen Monday  LLE currently wrapped  Next appointment next Tuesday  ? Consult wound care     · CKD   ? With acute kidney injury  ? Baseline creat seems to be 1 1-1 2   · CAD  ? Continue statin   · BPH  ? Continue Flomax     · Osteoporosis  ?  On alendronate at home, non formulary      VTE Prophylaxis: Rivaroxaban (Xarelto)  / sequential compression device   Code Status: FULL code  POLST: POLST form is not discussed and not completed at this time    Anticipated Length of Stay:  Patient will be admitted on an Inpatient basis with an anticipated length of stay of  Greater than 2 midnights  Justification for Hospital Stay: IV hydration, monitoring of labs, cardiology consult         Admission Orders:  Scheduled Meds:   doxycycline hyclate 100 mg Oral Q12H [address]   fluticasone-salmeterol 1 puff Inhalation Q12H [address]   furosemide 40 mg Oral Daily   insulin lispro 1-5 Units Subcutaneous TID AC   insulin lispro 1-5 Units Subcutaneous HS   magnesium gluconate 250 mg Oral BID AC   metoprolol succinate 25 mg Oral Daily   pantoprazole 20 mg Oral Early Morning   potassium chloride 40 mEq Oral BID With Meals   pravastatin 20 mg Oral Daily With Dinner   rivaroxaban 15 mg Oral Daily With Breakfast   senna 1 tablet Oral HS   tamsulosin 0 4 mg Oral Daily With Dinner     Continuous Infusions:    PRN Meds:   acetaminophen    ipratropium-albuterol      Fingerstick ac and hs   Cons carb diet   Fall precautions  Up and oOB as caro   Daily weight   I&O   Echo  CT chest   12/1bmp, cbc , ck , mg   OT PT eval   Speech eval   Wound care consult  pulm consult   SCD  Tele   11/30 ck , mg , cbc, bmp, ckmb , hgb a1c   Serial trop     CK MB   5 1 , total CK   1084, wbc  10 85, K  3 3, BUN creat   26  1 34, steven  8 1  Trop  #1   3 62   #2   3 58   #3  3 02  #4  2 37    Cardiology consult 11/29  Assessment/Plan:  1  NSTEMI type to be determined: 1st troponin 3 65, subsequent troponins pending  Continue to trend  Likely due to fall and rhabdomyolysis in light of elevated CKMB, but primary cardiac cause cannot be ruled out  Will hold off heparin drip for now due to clinical picture more convincing for rhabdomyolysis, patient will be high bleeding risk due to Xarelto and poor renal function  ECHO pending  Will assess for EF and regional wall motion abnormalities  Further recommendations after ECHO and troponin trend    IV fluids as managed per SLIM    2  A  Fib/Flutter on Xarelto: Patient is RRR on exam but EKG and tele show A  Fib/Flutter that is rate controlled  Occasional periods of bradycardia seen on tele  Rate controlled on Toprol 25 mg PO daily  Continue Xarelto for anticoagulation for now  Will hold off heparin drip  as patient is high bleeding risk and clinical picture is more consistent with rhabdomyolysis    3  CHF: ECHO already ordered  Will assess EF and regional wall motion abnormalities  Patient is not volume overloaded and does not seem to be in exacerbation  Continue lasix 40 mg PO daily, toprol 25 mg daily  No ACEI/ARB in light of elevated Cr of 1 62    4  Hx CAD s/p stents x2: Stable  No anginal symptoms at this time  Continue ASA, statin, BB   5  Hx CVA: Stable  Continue ASA, statin   6  PVD: Stable  With stent in place  Continue ASA, statin   7  HTN: Last recorded /69  Stable  Continue all antihypertensive medications     8  HLD: Continue statin    Internal med progress note 11/30  Assessment:  Principal Problem:    Rhabdomyolysis  Active Problems:    Type 2 diabetes mellitus (HCC)    GERD (gastroesophageal reflux disease)    BPH (benign prostatic hyperplasia)    Atrial fibrillation ([facility])    Venous stasis dermatitis of left lower extremity    Congestive cardiac failure ([facility])    CKD (chronic kidney disease)    Acute kidney injury ([facility] )    Osteoporosis    Fall    Shortness of breath    Cough    NSTEMI (non-ST elevated myocardial infarction) ([facility])    COPD (chronic obstructive pulmonary disease) ([facility])    Hypokalemia    Abnormal chest x-ray  Resolved Problems:    * No resolved hospital problems  *   Plan:  · Rhabdomyolysis is gradually improving, Hep-Lock IV and monitor CK  · Non ST elevation MI-cardiology following  Known history of coronary artery disease and CHF  Echocardiogram is ordered    Will assess EF and wall motion abnormality and decide on further functional assessment  · Right upper lobe density seen on chest x-ray with abnormal lung exam and 1 month of cough-add nebulizers, start doxycycline as patient is allergic to cephalosporins  Check CT chest   Consult Pulmonary  Continue Advair    · Type 2 diabetes mellitus-metformin on hold due to acute kidney injury, continue on sliding scale  · Fall-PT evaluation pending, patient may need short-term rehabilitation  · Acute kidney injury-improving, monitor daily BMP  · Atrial fibrillation-heart rate controlled, anticoagulated with Xarelto  · Hypokalemia-oral replete

## 2017-11-30 NOTE — PLAN OF CARE
Problem: DISCHARGE PLANNING - CARE MANAGEMENT  Goal: Discharge to post-acute care or home with appropriate resources  INTERVENTIONS:  - Conduct assessment to determine patient/family and health care team treatment goals, and need for post-acute services based on payer coverage, community resources, and patient preferences, and barriers to discharge  - Address psychosocial, clinical, and financial barriers to discharge as identified in assessment in conjunction with the patient/family and health care team  - Arrange appropriate level of post-acute services according to patients   needs and preference and payer coverage in collaboration with the physician and health care team  - Communicate with and update the patient/family, physician, and health care team regarding progress on the discharge plan  - Arrange appropriate transportation to post-acute venues   Outcome: Progressing  LOS: 1 Nurse requested CCM meet with pt's daughter at bedside  CCM met with pt's daughter at bedside  Pt and pt's daughter discussed STR upon discharge and they are agreeable to [facility]  Pt has hx of [facility] per daughter  Pt was agreeable for CCM to phone daughterGerry [phone] or son in lawDanny [phone] with updates  CCM sent referral  CCM awaiting PT eval  Pt has no other needs at this time

## 2017-11-30 NOTE — OCCUPATIONAL THERAPY NOTE
Occupational Therapy Evaluation        Patient Name: Rupesh Wahl  [facility] Date: 11/30/2017 11/30/17 1620   Note Type   Note type Eval/Treat   Restrictions/Precautions   Weight Bearing Precautions Per Order No   Braces or Orthoses (Unna Boot LLE- changed every 7 days)   Other Precautions Chair Alarm; Bed Alarm;Telemetry; Fall Risk;Pain;Hard of hearing   Pain Assessment   Pain Assessment No/denies pain   Pain Score No Pain   Home Living   Type of Home Apartment  (2nd floor apartment/ [employer] ([facility]))   Home Layout Elevator; One level;Performs ADLs on one level  (2nd floor apartment)   Bathroom Shower/Tub Tub/shower unit   Bathroom Toilet Standard   Bathroom Equipment Grab bars in shower;Grab bars around toilet; Shower chair   [address] Walker;Cane;Hospital bed;Life alert  (life alert and phone not accessible at time of fall)   Prior Function   Level of [place] Independent with ADLs and functional mobility   Lives With Alone   Receives Help From Family  (2 daughters that live locally)   ADL Assistance Independent   IADLs Independent   Falls in the last 6 months 1 to 4  (March/ 2017 due to low blood sugar/ 2nd prior to admission)   Vocational Retired   Comments does not drive/ daughters assist with shopping and transportation,    Lifestyle   Autonomy Patient reporting independent with ADLs, manages medications on his own, may cook a light meal/  orders take out, ambulates with walker  Patient lives in a second floor apt, at  [employer], no steps to enter  Patient does not drive, daughters assist with transportation and shopping     Psychosocial   Psychosocial (WDL) WDL   ADL   Eating Assistance 5  Supervision/Setup   Grooming Assistance 4  Minimal Assistance   UB Bathing Assistance 3  Moderate Assistance   LB Bathing Assistance 2  Maximal Assistance   UB Dressing Assistance 4  [address] 2  Maximal Assistance   Toileting Assistance  3  Moderate Assistance   Functional Assistance 3  Moderate Assistance   Bed Mobility   Additional Comments Patient received OOB to Recliner, Nursing transferred patient OOB with assist x1 /RW   Transfers   Sit to Stand 3  Moderate assistance   Additional items Assist x 1; Armrests; Increased time required;Verbal cues   Stand to Sit 4  Minimal assistance   Additional items Assist x 1; Increased time required;Verbal cues;Armrests   Functional Mobility   Functional Mobility 3  Moderate assistance   Additional items Rolling walker   Balance   Static Sitting Fair +   Dynamic Sitting Fair   Static Standing Fair   Dynamic Standing Fair -   Activity Tolerance   Activity Tolerance Patient limited by fatigue  (frequent coughing)   Nurse Made Aware CJ Beard verbalized patient appropriate for therapy   RUE Assessment   RUE Assessment X  (~90 shoulder flexion/ Kyphosis)   RUE Overall AROM   R Shoulder Flexion ~90 degrees   RUE Strength   RUE Overall Strength Deficits  (3+/5)   LUE Assessment   LUE Assessment X  (~90 shoulder flexion/ Kyphosis)   LUE Strength   LUE Overall Strength Deficits  (3+/5 )   Hand Function   Gross Motor Coordination Functional   Fine Motor Coordination Functional   Sensation   Light Touch No apparent deficits  (BUEs)   Proprioception   Proprioception No apparent deficits  (BUEs)   Vision-Basic Assessment   Current Vision Wears glasses only for reading   Patient Visual Report Other (Comment)  (No changes reported)   Cognition   Overall Cognitive Status WFL   Arousal/Participation Alert; Responsive; Cooperative   Attention Attends with cues to redirect   Orientation Level Oriented X4   Memory Within functional limits   Following Commands Follows all commands and directions without difficulty   Assessment   Limitation Decreased ADL status; Decreased UE strength;Decreased UE ROM; Decreased Safe judgement during ADL;Decreased endurance;Decreased self-care trans;Decreased high-level ADLs   Prognosis Good   Assessment Patient is a 81 y o  male seen for OT evaluation s/p admit to [facility] on 11/29/2017 w/Rhabdomyolysis  Patient sustained a mechanical fall at home, was unable to call for assistance and was on the floor 10-12 hours, managed to crawl around and call daughter at [address] [facility] affecting patient's functional performance at time of assessment include:Cardiac Disease, COPD, GERD, Hyperlipidemia, Hypertension, Renal Disorder  Prior to admission, Patient reporting independent with ADLs, manages medications on his own, may cook a light meal/  orders take out, ambulates with walker  Patient lives in a second floor apt, at [facility], no steps to enter  Patient does not drive, daughters assist with transportation and shopping    Personal factors affecting patient at time of initial evaluation include: limited caregiver support, decreased initiation and engagement, difficulty performing ADLs and difficulty performing IADLs  Upon evaluation, patient requires minimal  and moderate assist for UB ADLs, maximal assist for LB ADLs, transfers and functional ambulation in room and bathroom with minimal  and moderate assist, with the use of Rolling Walker  Patient did not tolerate walking to bathroom, due to poor endurance, frequent coughing and generalized weakness  Occupational performance is affected by the following deficits: decreased functional use of BUEs, limited active ROM, decreased muscle strength, degenerative arthritic joint changes, dynamic sit/ stand balance deficit with poor standing tolerance time for self care and functional mobility, decreased activity tolerance, decreased safety awareness and postural control and postural alignment deficit, requiring external assistance to complete transitional movements   Therapist completed  extensive additional review of medical records and additional review of physical, cognitive or psychosocial history, clinical examination identifying 5 or more performance deficits, clinical decision making of a high complexity , consistent with a high complexity level evaluation  Patient to benefit from continued Occupational Therapy treatment while in the hospital to address deficits as defined above and maximize level of functional independence with ADLs and functional mobility  Plan   Treatment Interventions ADL retraining;Functional transfer training;UE strengthening/ROM; Endurance training;Patient/family training;Equipment evaluation/education; Compensatory technique education;Continued evaluation; Energy conservation; Activityengagement   Goal Expiration Date 12/14/17   OT Frequency 3-5x/wk   Recommendation   OT Discharge Recommendation Short Term Rehab   OT - OK to Discharge Yes  (STR when medically cleared)   Barthel Index   Feeding 10   Bathing 0   Grooming Score 0   Dressing Score 5   Bladder Score 5   Bowels Score 5   Toilet Use Score 5   Transfers (Bed/Chair) Score 10   Mobility (Level Surface) Score 0   Stairs Score 0   Barthel Index Score 40   Modified [place] Scale   Modified [place] Scale 4     Occupational Performance areas to address include: grooming , bathing/ shower, dressing, toilet hygiene, transfer to all surfaces, functional mobility, emergency response, health maintenance, medication routine/ management, IADLs: safety procedures, IADLs: meal prep/ clean up, Leisure Participation and Social participation  From OT standpoint, recommendation at time of d/c would be Short Term Rehab  Occupational therapy Goals: In 7- 10 days:  1- Patient will verbalize and demonstrate use of energy conservation/ deep breathing technique and work simplification skills during functional activity with no verbal cues    2- Patient will verbalize and demonstrate good body mechanics and joint protection techniques during  ADLs/ IADLs with no verbal cues   3- Patient will increase OOB/ sitting tolerance to 2-4 hours per day for increased participation in self care and leisure tasks with no s/s of exertion    4- Patient will identify s/s of exertion during ADL and functional mobility with no  verbal cues  5-Patient will verbalize/ demonstrate  compensatory strategies to recover from exertion with no  verbal cues  6-Patient will increase standing tolerance time to  10  minutes with  Unilateral UE support to complete sink level ADLs @ Mod I level    7- Patient will increase sitting tolerance at edge of bed to 30 minutes to complete UB ADLs @ Mod I level  ADLs/IADLs  8- Patient will complete LB ADLs at Mod I level,  with the use of AE as indicated    5- Patient/ Family  will demonstrate competency with UE Home Exercise Program

## 2017-11-30 NOTE — SPEECH THERAPY NOTE
Request for swallowing evaluation received  Spoke briefly c RN    Plan:  Swallowing Evaluation planned for Fri am

## 2017-11-30 NOTE — PROGRESS NOTES
Progress Note - Cardiology     Bindu Moment 81 y o  male MRN: [id number]  Unit/Bed#: -01 Encounter: [id number]      Subjective:   No significant events overnight  Patient denies chest pain, SOB, palpitations, lightheadedness  Patient feels much better than yesterday  Objective:   Vitals:  Vitals:    11/30/17 0700   BP: 158/77   Pulse: 66   Resp: 20   Temp: (!) 97 4 °F (36 3 °C)   SpO2: 96%       Body mass index is 25 95 kg/m²  Systolic (32YPZ), VVR:817 , Min:102 , TSZ:136     Diastolic (52EUO), NEC:72, Min:54, Max:81      Intake/Output Summary (Last 24 hours) at 11/30/17 1048  Last data filed at 11/30/17 0900   Gross per 24 hour   Intake             1480 ml   Output                0 ml   Net             1480 ml     Weight (last 2 days)     Date/Time   Weight    11/30/17 0300  79 7 (175 71)              PHYSICAL EXAM:  General: Patient is not in acute distress  Laying comfortably in the bed  Awake, alert, responding to commands  Head: Normocephalic  Atraumatic  HEENT: Both pupils normal sized, round and reactive to light  Nonicteric  Neck: JVP not raised  Trachea central  No thyromegaly  Respiratory: Bilateral bronchovascular breath sounds with no crackles or rhonchi  Cardiovascular: RRR  S1 and S2 normal  No murmur, rub or gallop  GI: Abdomen soft, nontender  No guarding or rigidity  Liver and spleen not palpable  Bowel sounds present  Neurologic: Patient is awake, alert, responding to command   Moving all extremities  Integumentary:  No skin rash  Lymphatic: No cervical lymphadenopathy  Extremities: No clubbing, cyanosis or edema      LABORATORY RESULTS:    Results from last 7 days  Lab Units 11/30/17  0437 11/30/17  0008 11/29/17 2024 11/29/17 1734 11/29/17  1409   CK TOTAL U/L 1,084*  --   --   --  2,573*   TROPONIN I ng/mL  --  2 37* 3 02* 3 58* 3 62*   CK MB INDEX % <1 0  --   --   --  <1 0     CBC with diff:   Results from last 7 days  Lab Units 11/30/17 0437 11/29/17 1734 11/29/17  1409   WBC Thousand/uL 10 85*  --  12 93*   HEMOGLOBIN g/dL 12 4  --  14 2   HEMATOCRIT % 39 9  --  45 8   MCV fL 74*  --  74*   PLATELETS Thousands/uL 150 175 186   MCH pg 22 9*  --  22 9*   MCHC g/dL 31 1*  --  31 0*   RDW % 18 0*  --  18 6*   MPV fL 8 9 9 8 9 5   NRBC AUTO /100 WBCs  --   --  0       CMP:  Results from last 7 days  Lab Units 11/30/17  0437 11/29/17  1409   SODIUM mmol/L 138 140   POTASSIUM mmol/L 3 3* 4 0   CHLORIDE mmol/L 105 103   CO2 mmol/L 24 25   ANION GAP mmol/L 9 12   BUN mg/dL 26* 24   CREATININE mg/dL 1 34* 1 62*   GLUCOSE RANDOM mg/dL 90 93   CALCIUM mg/dL 8 1* 9 1   AST U/L  --  82*   ALT U/L  --  24   ALK PHOS U/L  --  73   TOTAL PROTEIN g/dL  --  7 1   ALBUMIN g/dL  --  2 6*   BILIRUBIN TOTAL mg/dL  --  2 50*   EGFR ml/min/1 73sq m 49 39       BMP:  Results from last 7 days  Lab Units 11/30/17  0437 11/29/17  1409   SODIUM mmol/L 138 140   POTASSIUM mmol/L 3 3* 4 0   CHLORIDE mmol/L 105 103   CO2 mmol/L 24 25   BUN mg/dL 26* 24   CREATININE mg/dL 1 34* 1 62*   GLUCOSE RANDOM mg/dL 90 93   CALCIUM mg/dL 8 1* 9 1                Results from last 7 days  Lab Units 11/29/17  1409   MAGNESIUM mg/dL 1 7               Results from last 7 days  Lab Units 11/29/17  1526 11/29/17  1409   TSH 3RD GENERATON uIU/mL  --  6 748*   FREE T4 ng/dL 1 02  --          Results from last 7 days  Lab Units 11/29/17  1409   INR  1 80*       Lipid Profile:   Lab Results   Component Value Date    CHOL 84 09/26/2017    CHOL 115 03/30/2017    CHOL 105 11/03/2016     Lab Results   Component Value Date    HDL 35 (L) 09/26/2017    HDL 55 03/30/2017    HDL 46 11/03/2016     Lab Results   Component Value Date    LDLCALC 37 09/26/2017    LDLCALC 50 03/30/2017    LDLCALC 52 11/03/2016     Lab Results   Component Value Date    TRIG 60 09/26/2017    TRIG 51 03/30/2017    TRIG 35 11/03/2016       Imaging: I have personally reviewed pertinent reports      Procedure: Xr Chest Pa & Lateral    Result Date: 11/29/2017  Narrative: CHEST INDICATION:  Patient fell  Cough  COMPARISON:  None VIEWS:  Frontal and lateral projections IMAGES:  2 FINDINGS:  Lungs are adequately aerated  Small effusions on lateral view larger on the left and right  Ovoid 3 6 cm density in the right upper lobe of uncertain etiology, may be infectious or neoplastic  Follow-up imaging recommended after treatment  Right basilar infiltrate or atelectasis  Cardiac silhouette is enlarged  Mild vascular congestion  Prominent sean on lateral view likely dilated pulmonary vessels  No significant peribronchial thickening  Atherosclerotic aorta  Bony structures are diffusely demineralized and degenerative  No displaced rib fractures are seen  No pneumothorax  Impression: Ovoid 3 6 cm density in the right upper lobe may be infectious or neoplastic  Infiltrate or atelectasis in the right lung base  Small pleural effusions larger on the left than right  Cardiomegaly with mild vascular congestion but without significant interstitial edema  *Follow-up x-ray recommended after treatment (computed tomography recommended if right upper lobe density persists)  Workstation performed: [id number]     Procedure: Ct Head Without Contrast    Result Date: 11/29/2017  Narrative: CT BRAIN - WITHOUT CONTRAST INDICATION:  Fall  COMPARISON:  None  TECHNIQUE:  CT examination of the brain was performed  In addition to axial images, coronal reformatted images were created and submitted for interpretation  Radiation dose length product (DLP) for this visit:  949 mGy-cm   This examination, like all CT scans performed in the [facility], was performed utilizing techniques to minimize radiation dose exposure, including the use of iterative reconstruction and automated exposure control  IMAGE QUALITY:  Diagnostic   FINDINGS:  PARENCHYMA:  Decreased attenuation is noted in the supratentorial white matter demonstrating an appearance most consistent with moderate microangiopathic change  No intracranial mass, mass effect or midline shift  No CT signs of acute infarction  There is no parenchymal hemorrhage  VENTRICLES AND EXTRA-AXIAL SPACES:  Enlargement of ventricles and extra-axial CSF spaces consistent with cerebral and cerebellar atrophy  VISUALIZED ORBITS AND PARANASAL SINUSES:  Unremarkable  CALVARIUM AND EXTRACRANIAL SOFT TISSUES:   Normal      Impression: No acute intracranial abnormality  Workstation performed: [id number]     Procedure: Ct Cervical Spine Without Contrast    Result Date: 11/29/2017  Narrative: CT CERVICAL SPINE - WITHOUT CONTRAST INDICATION: Fall  COMPARISON: None  TECHNIQUE:  CT examination of the cervical spine was performed without intravenous contrast   Contiguous axial images were obtained  Sagittal and coronal reconstructions were performed  Radiation dose length product (DLP) for this visit:  481 mGy-cm   This examination, like all CT scans performed in the [facility], was performed utilizing techniques to minimize radiation dose exposure, including the use of iterative reconstruction and automated exposure control  IMAGE QUALITY:  Diagnostic  FINDINGS: ALIGNMENT:  Normal alignment of the cervical spine  No subluxation  VERTEBRAL BODIES:  No fracture  DEGENERATIVE CHANGES:  Mild multilevel cervical degenerative changes are noted without critical central canal stenosis  PREVERTEBRAL AND PARASPINAL SOFT TISSUES: Normal         THORACIC INLET:  Emphysema is noted at the lung apices  Impression: No cervical spine fracture or traumatic malalignment       Workstation performed: [id number]       Telemetry Review:  Occasional PVCs, otherwise NSR    Meds/Allergies   all current active meds have been reviewed and current meds:   Current Facility-Administered Medications   Medication Dose Route Frequency    acetaminophen (TYLENOL) tablet 650 mg  650 mg Oral Q6H PRN    doxycycline hyclate (VIBRAMYCIN) capsule 100 mg  100 mg Oral Q12H [address]  fluticasone-salmeterol (ADVAIR) 250-50 mcg/dose inhaler 1 puff  1 puff Inhalation Q12H [address]    furosemide (LASIX) tablet 40 mg  40 mg Oral Daily    insulin lispro (HumaLOG) 100 units/mL subcutaneous injection 1-5 Units  1-5 Units Subcutaneous TID AC    insulin lispro (HumaLOG) 100 units/mL subcutaneous injection 1-5 Units  1-5 Units Subcutaneous HS    ipratropium-albuterol (DUO-NEB) 0 5-2 5 mg/mL inhalation solution 3 mL  3 mL Nebulization Q4H PRN    magnesium gluconate (MAGONATE) tablet 250 mg  250 mg Oral BID AC    metoprolol succinate (TOPROL-XL) 24 hr tablet 25 mg  25 mg Oral Daily    pantoprazole (PROTONIX) EC tablet 20 mg  20 mg Oral Early Morning    potassium chloride (K-DUR,KLOR-CON) CR tablet 40 mEq  40 mEq Oral BID With Meals    pravastatin (PRAVACHOL) tablet 20 mg  20 mg Oral Daily With Dinner    rivaroxaban (XARELTO) tablet 15 mg  15 mg Oral Daily With Breakfast    senna (SENOKOT) tablet 8 6 mg  1 tablet Oral HS    tamsulosin (FLOMAX) capsule 0 4 mg  0 4 mg Oral Daily With Dinner     Prescriptions Prior to Admission   Medication    alendronate (FOSAMAX) 70 mg tablet    fluticasone-salmeterol (ADVAIR DISKUS) 250-50 mcg/dose inhaler    furosemide (LASIX) 40 mg tablet    Lancets (FREESTYLE) lancets    magnesium chloride-calcium (SLOW-MAG) 71 5-119 mg    metFORMIN (GLUCOPHAGE-XR) 500 mg 24 hr tablet    metoprolol succinate (TOPROL-XL) 25 mg 24 hr tablet    potassium chloride (KLOR-CON M20) 20 mEq tablet    rivaroxaban (XARELTO) 15 mg tablet    simvastatin (ZOCOR) 20 mg tablet    tamsulosin (FLOMAX) 0 4 mg    Omeprazole 20 MG TBEC         Assessment and Plan:    1  NSTEMI, likely type 2 due to fall and rhabdomyolysis but primary cardiac cause cannot be ruled out  Will hold off heparin drip for now due to clinical picture more convincing for rhabdomyolysis + patient will be high bleeding risk due to being on Xarelto and poor renal function  ECHO reviewed       2   AFib/flutter on Xarelto:  Currently normal sinus rhythm with occasional PVCs on tele  Rate controlled on Toprol 25 mg p o  daily  Continue Xarelto for anticoagulation for now  No heparin drip as clinical picture is more consistent with rhabdomyolysis, troponins are trending down, and patient denies anginal symptoms  3   Congestive heart failure:  Continue Lasix 40 mg p  o  daily, Toprol 25 mg p o  daily, serum Cr improved from yesterday currently 1 34  May consider adding Ace inhibitor or ARB in light of improving creatinine  4   CAD S/P stents x2:  Stable  Continue statin, beta-blocker  No aspirin due to high bleeding risk along with Xarelto    5  Hypertension:  Stable  Continue all antihypertensive medications  6   Hyperlipidemia:  Continue statin    ** Please Note: Dictation voice to text software may have been used in the creation of this document   **

## 2017-11-30 NOTE — PLAN OF CARE
DISCHARGE PLANNING     Discharge to home or other facility with appropriate resources Progressing        Knowledge Deficit     Patient/family/caregiver demonstrates understanding of disease process, treatment plan, medications, and discharge instructions Progressing        MUSCULOSKELETAL - ADULT     Maintain or return mobility to safest level of function Progressing        Potential for Falls     Patient will remain free of falls Progressing        Prexisting or High Potential for Compromised Skin Integrity     Skin integrity is maintained or improved Progressing        SKIN/TISSUE INTEGRITY - ADULT     Skin integrity remains intact Progressing

## 2017-11-30 NOTE — SOCIAL WORK
LOS: 1 CM met with pt at bedside  Pt reports he lives in [facility] alone  Pt reports he lives in 2nd floor apartment  Pt denies DME  Pt reports he can ambulate and complete ADL's independently  Pt has hx of rehab at [facility]  Pt has hx of HHC but cannot recall agency  Pt uses [facility] in [facility]  Pt denies hx of MH and substance abuse  Pt reports 2 daughters are POA  Pt does not drive and reports daughter transports to appointments  CM offered to contact daughter and pt declined  CM awaiting PT recs  CM department will follow up  Pt has no other needs at this time

## 2017-11-30 NOTE — SOCIAL WORK
LOS: 1 Nurse requested CCM meet with pt's daughter at bedside  CCM met with pt's daughter at bedside  Pt and pt's daughter discussed STR upon discharge and they are agreeable to [facility]  Pt has hx of [facility] per daughter  Pt was agreeable for CCM to phone daughterJoe [phone] or son in law, Delmar Barahona [phone] with updates  CCM sent referral  CCM awaiting PT eval  Pt has no other needs at this time

## 2017-11-30 NOTE — PLAN OF CARE
Problem: DISCHARGE PLANNING - CARE MANAGEMENT  Goal: Discharge to post-acute care or home with appropriate resources  INTERVENTIONS:  - Conduct assessment to determine patient/family and health care team treatment goals, and need for post-acute services based on payer coverage, community resources, and patient preferences, and barriers to discharge  - Address psychosocial, clinical, and financial barriers to discharge as identified in assessment in conjunction with the patient/family and health care team  - Arrange appropriate level of post-acute services according to patients   needs and preference and payer coverage in collaboration with the physician and health care team  - Communicate with and update the patient/family, physician, and health care team regarding progress on the discharge plan  - Arrange appropriate transportation to post-acute venues  Outcome: Progressing  LOS: 1 CM met with pt at bedside  Pt reports he lives in [place] alone  Pt reports he lives in 2nd floor apartment  Pt denies DME  Pt reports he can ambulate and complete ADL's independently  Pt has hx of rehab at [facility]  Pt has hx of HHC but cannot recall agency  Pt uses [facility] in [place]  Pt denies hx of MH and substance abuse  Pt reports 2 daughters are POA  Pt does not drive and reports daughter transports to appointments  CM offered to contact daughter and pt declined  CM awaiting PT recs  CM department will follow up  Pt has no other needs at this time

## 2017-11-30 NOTE — PROGRESS NOTES
Progress Note - Juan Francisco Gravsabina 81 y o  male MRN: [id number]  Unit/Bed#: -01 Encounter: [id number]    Subjective:   Julianne Slade is feeling a little bit better this morning  He denies any pain  He has had persistent minimally productive cough over the last 1 month without any wheezing, chest pain or change from baseline exertion related dyspnea  He has not had any hemoptysis  He denies fevers or chills  He denies headache, blurred vision, focal numbness or weakness  His bowels are moving regularly  He has some difficulty emptying his bladder but denies dysuria  All other ROS are negative  Objective:   Vitals: Blood pressure 158/77, pulse 66, temperature (!) 97 4 °F (36 3 °C), temperature source Oral, resp  rate 20, height 5' 9" (1 753 m), weight 79 7 kg (175 lb 11 3 oz), SpO2 96 %  ,Body mass index is 25 95 kg/m²    SPO2 RA Rest    Flowsheet Row ED to Hosp-Admission (Current) from 11/29/2017 in [address] [place] [address] 2nd Floor Med Surg Unit   SpO2  96 %   SpO2 Activity  At Rest   O2 Device  None (Room air)   O2 Flow Rate  No data        I&O:   Intake/Output Summary (Last 24 hours) at 11/30/17 0802  Last data filed at 11/29/17 1630   Gross per 24 hour   Intake             1000 ml   Output                0 ml   Net             1000 ml         Physical Exam:       General Appearance:    Alert, cooperative, chronically ill-appearing, no distress   Head:    Normocephalic, without obvious abnormality, atraumatic   Eyes:    PERRL, conjunctiva/corneas clear, EOM's intact       Nose:   Moist mucous membranes, no drainage or sinus tenderness       Neck:   Supple, symmetrical, trachea midline, no JVD   Lungs:     Poor air exchange, coarse scattered rhonchi, no wheezes, expiratory phase is prolonged   Heart:    Irregular with 1/6 systolic murmur   Abdomen: Soft, non-tender, positive bowel sounds, no masses, no organomegaly   Extremities:  Chronic venous insufficiency changes of right lower extremity with dry skin, no edema, left lower extremity Unna boot  Thickened and mycotic nails   Neurologic:     CNII-XII intact  Invasive Devices     Peripheral Intravenous Line            Peripheral IV 11/29/17 Left Antecubital less than 1 day                      Social History  reviewed  History reviewed  No pertinent family history   reviewed    Meds:  Current Facility-Administered Medications   Medication Dose Route Frequency Provider Last Rate Last Dose    acetaminophen (TYLENOL) tablet 650 mg  650 mg Oral Q6H PRN BHANU Waters        fluticasone-salmeterol (ADVAIR) 250-50 mcg/dose inhaler 1 puff  1 puff Inhalation Q12H [address] [place] BHANU Torres   1 puff at 11/29/17 2019    furosemide (LASIX) tablet 40 mg  40 mg Oral Daily BHANU Waters        insulin lispro (HumaLOG) 100 units/mL subcutaneous injection 1-5 Units  1-5 Units Subcutaneous TID AC BHANU Waters        insulin lispro (HumaLOG) 100 units/mL subcutaneous injection 1-5 Units  1-5 Units Subcutaneous HS BHANU Ramos   1 Units at 11/29/17 2207    ipratropium-albuterol (DUO-NEB) 0 5-2 5 mg/mL inhalation solution 3 mL  3 mL Nebulization Q4H PRN Migdalia Bradley MD        magnesium gluconate (MAGONATE) tablet 250 mg  250 mg Oral BID AC BHANU Waters   250 mg at 11/30/17 9484    metoprolol succinate (TOPROL-XL) 24 hr tablet 25 mg  25 mg Oral Daily BHANU Waters        pantoprazole (PROTONIX) EC tablet 20 mg  20 mg Oral Early Morning BHANU Caro   20 mg at 11/30/17 2443    potassium chloride (K-DUR,KLOR-CON) CR tablet 40 mEq  40 mEq Oral BID With Meals Migdalia Bradley MD        pravastatin (PRAVACHOL) tablet 20 mg  20 mg Oral Daily With [employer]   20 mg at 11/29/17 1815    rivaroxaban (XARELTO) tablet 15 mg  15 mg Oral Daily With [employer], CRNP        senna (SENOKOT) tablet 8 6 mg  1 tablet Oral HS BHANU Waters   8 6 mg at 11/29/17 2200    sodium chloride 0 9 % infusion 75 mL/hr Intravenous Continuous Joe Osborne, CRNP 75 mL/hr at 11/29/17 1734 75 mL/hr at 11/29/17 1734    tamsulosin (FLOMAX) capsule 0 4 mg  0 4 mg Oral Daily With [employer] ARCHIE Vega, BHANU   0 4 mg at 11/29/17 1734      Prescriptions Prior to Admission   Medication    alendronate (FOSAMAX) 70 mg tablet    fluticasone-salmeterol (ADVAIR DISKUS) 250-50 mcg/dose inhaler    furosemide (LASIX) 40 mg tablet    Lancets (FREESTYLE) lancets    magnesium chloride-calcium (SLOW-MAG) 71 5-119 mg    metFORMIN (GLUCOPHAGE-XR) 500 mg 24 hr tablet    metoprolol succinate (TOPROL-XL) 25 mg 24 hr tablet    potassium chloride (KLOR-CON M20) 20 mEq tablet    rivaroxaban (XARELTO) 15 mg tablet    simvastatin (ZOCOR) 20 mg tablet    tamsulosin (FLOMAX) 0 4 mg    Omeprazole 20 MG TBEC       Labs:    Results from last 7 days  Lab Units 11/30/17  0437 11/29/17 1734 11/29/17  1409   WBC Thousand/uL 10 85*  --  12 93*   HEMOGLOBIN g/dL 12 4  --  14 2   HEMATOCRIT % 39 9  --  45 8   PLATELETS Thousands/uL 150 175 186   NEUTROS PCT %  --   --  87*   LYMPHS PCT %  --   --  8*   MONOS PCT %  --   --  4   EOS PCT %  --   --  0       Results from last 7 days  Lab Units 11/30/17  0437 11/29/17  1409   SODIUM mmol/L 138 140   POTASSIUM mmol/L 3 3* 4 0   CHLORIDE mmol/L 105 103   CO2 mmol/L 24 25   BUN mg/dL 26* 24   CREATININE mg/dL 1 34* 1 62*   CALCIUM mg/dL 8 1* 9 1   TOTAL PROTEIN g/dL  --  7 1   BILIRUBIN TOTAL mg/dL  --  2 50*   ALK PHOS U/L  --  73   ALT U/L  --  24   AST U/L  --  82*   GLUCOSE RANDOM mg/dL 90 93     Lab Results   Component Value Date    TROPONINI 2 37 (H) 11/30/2017    TROPONINI 3 02 (H) 11/29/2017    TROPONINI 3 58 (H) 11/29/2017    CKTOTAL 1,084 (H) 11/30/2017    CKTOTAL 2,573 (H) 11/29/2017       Results from last 7 days  Lab Units 11/29/17  1409   INR  1 80*     No results found for: Saintclair Marble, SPUTUMCULTUR    Imaging:  No results found for this or any previous visit    Results for orders placed during the hospital encounter of 11/29/17   XR chest pa & lateral    Narrative CHEST     INDICATION:  Patient fell  Cough  COMPARISON:  None    VIEWS:  Frontal and lateral projections    IMAGES:  2    FINDINGS:  Lungs are adequately aerated  Small effusions on lateral view larger on the left and right  Ovoid 3 6 cm density in the right upper lobe of uncertain etiology, may be infectious or neoplastic  Follow-up imaging recommended after treatment  Right basilar infiltrate or atelectasis  Cardiac silhouette is enlarged  Mild vascular congestion  Prominent sean on lateral view likely dilated pulmonary vessels  No significant peribronchial thickening  Atherosclerotic aorta  Bony structures are diffusely demineralized and degenerative  No displaced rib fractures are seen  No pneumothorax  Impression Ovoid 3 6 cm density in the right upper lobe may be infectious or neoplastic  Infiltrate or atelectasis in the right lung base  Small pleural effusions larger on the left than right  Cardiomegaly with mild vascular congestion but without significant interstitial edema  *Follow-up x-ray recommended after treatment (computed tomography recommended if right upper lobe density persists)        Workstation performed: [id number]         VTE Pharmacologic Prophylaxis: Xarelto      Code Status:   Level 1 - Full Code    Assessment:  Principal Problem:    Rhabdomyolysis  Active Problems:    Type 2 diabetes mellitus (HCC)    GERD (gastroesophageal reflux disease)    BPH (benign prostatic hyperplasia)    Atrial fibrillation (HCC)    Venous stasis dermatitis of left lower extremity    Congestive cardiac failure (HCC)    CKD (chronic kidney disease)    Acute kidney injury ([address] )    Osteoporosis    Fall    Shortness of breath    Cough    NSTEMI (non-ST elevated myocardial infarction) (HCC)    COPD (chronic obstructive pulmonary disease) (HCC)    Hypokalemia    Abnormal chest x-ray  Resolved Problems:    * No resolved hospital problems  *      Plan:  · Rhabdomyolysis is gradually improving, Hep-Lock IV and monitor CK  · Non ST elevation MI-cardiology following  Known history of coronary artery disease and CHF  Echocardiogram is ordered  Will assess EF and wall motion abnormality and decide on further functional assessment  · Right upper lobe density seen on chest x-ray with abnormal lung exam and 1 month of cough-add nebulizers, start doxycycline as patient is allergic to cephalosporins  Check CT chest   Consult Pulmonary  Continue Advair    · Type 2 diabetes mellitus-metformin on hold due to acute kidney injury, continue on sliding scale  · Fall-PT evaluation pending, patient may need short-term rehabilitation  · Acute kidney injury-improving, monitor daily BMP  · Atrial fibrillation-heart rate controlled, anticoagulated with Xarelto  · Hypokalemia-oral replete      Misty Ghotra MD  11/30/2017,8:02 AM

## 2017-12-01 PROBLEM — J18.9 PNEUMONIA: Status: ACTIVE | Noted: 2017-12-01

## 2017-12-01 PROBLEM — R91.8 PULMONARY NODULES: Status: ACTIVE | Noted: 2017-12-01

## 2017-12-01 LAB
ANION GAP SERPL CALCULATED.3IONS-SCNC: 11 MMOL/L (ref 4–13)
BASOPHILS # BLD AUTO: 0.03 THOUSANDS/ΜL (ref 0–0.1)
BASOPHILS NFR BLD AUTO: 0 % (ref 0–1)
BUN SERPL-MCNC: 20 MG/DL (ref 5–25)
CALCIUM SERPL-MCNC: 8 MG/DL (ref 8.3–10.1)
CHLORIDE SERPL-SCNC: 107 MMOL/L (ref 100–108)
CK MB SERPL-MCNC: 2.1 NG/ML (ref 0–5)
CK MB SERPL-MCNC: <1 % (ref 0–2.5)
CK SERPL-CCNC: 536 U/L (ref 39–308)
CO2 SERPL-SCNC: 21 MMOL/L (ref 21–32)
CREAT SERPL-MCNC: 1.07 MG/DL (ref 0.6–1.3)
EOSINOPHIL # BLD AUTO: 0.04 THOUSAND/ΜL (ref 0–0.61)
EOSINOPHIL NFR BLD AUTO: 1 % (ref 0–6)
ERYTHROCYTE [DISTWIDTH] IN BLOOD BY AUTOMATED COUNT: 17.6 % (ref 11.6–15.1)
GFR SERPL CREATININE-BSD FRML MDRD: 65 ML/MIN/1.73SQ M
GLUCOSE SERPL-MCNC: 117 MG/DL (ref 65–140)
GLUCOSE SERPL-MCNC: 118 MG/DL (ref 65–140)
GLUCOSE SERPL-MCNC: 127 MG/DL (ref 65–140)
GLUCOSE SERPL-MCNC: 136 MG/DL (ref 65–140)
GLUCOSE SERPL-MCNC: 152 MG/DL (ref 65–140)
HCT VFR BLD AUTO: 38.4 % (ref 36.5–49.3)
HGB BLD-MCNC: 11.8 G/DL (ref 12–17)
L PNEUMO1 AG UR QL IA.RAPID: NEGATIVE
LYMPHOCYTES # BLD AUTO: 0.96 THOUSANDS/ΜL (ref 0.6–4.47)
LYMPHOCYTES NFR BLD AUTO: 12 % (ref 14–44)
MAGNESIUM SERPL-MCNC: 1.7 MG/DL (ref 1.6–2.6)
MCH RBC QN AUTO: 22.5 PG (ref 26.8–34.3)
MCHC RBC AUTO-ENTMCNC: 30.7 G/DL (ref 31.4–37.4)
MCV RBC AUTO: 73 FL (ref 82–98)
MONOCYTES # BLD AUTO: 0.45 THOUSAND/ΜL (ref 0.17–1.22)
MONOCYTES NFR BLD AUTO: 6 % (ref 4–12)
NEUTROPHILS # BLD AUTO: 6.29 THOUSANDS/ΜL (ref 1.85–7.62)
NEUTS SEG NFR BLD AUTO: 80 % (ref 43–75)
NRBC BLD AUTO-RTO: 0 /100 WBCS
PLATELET # BLD AUTO: 153 THOUSANDS/UL (ref 149–390)
PMV BLD AUTO: 9.7 FL (ref 8.9–12.7)
POTASSIUM SERPL-SCNC: 3.5 MMOL/L (ref 3.5–5.3)
RBC # BLD AUTO: 5.24 MILLION/UL (ref 3.88–5.62)
S PNEUM AG UR QL: NEGATIVE
SODIUM SERPL-SCNC: 139 MMOL/L (ref 136–145)
WBC # BLD AUTO: 7.82 THOUSAND/UL (ref 4.31–10.16)

## 2017-12-01 PROCEDURE — 82948 REAGENT STRIP/BLOOD GLUCOSE: CPT

## 2017-12-01 PROCEDURE — G8979 MOBILITY GOAL STATUS: HCPCS

## 2017-12-01 PROCEDURE — 80048 BASIC METABOLIC PNL TOTAL CA: CPT | Performed by: INTERNAL MEDICINE

## 2017-12-01 PROCEDURE — 83735 ASSAY OF MAGNESIUM: CPT | Performed by: INTERNAL MEDICINE

## 2017-12-01 PROCEDURE — 82553 CREATINE MB FRACTION: CPT | Performed by: INTERNAL MEDICINE

## 2017-12-01 PROCEDURE — 97163 PT EVAL HIGH COMPLEX 45 MIN: CPT

## 2017-12-01 PROCEDURE — 85025 COMPLETE CBC W/AUTO DIFF WBC: CPT | Performed by: INTERNAL MEDICINE

## 2017-12-01 PROCEDURE — 82550 ASSAY OF CK (CPK): CPT | Performed by: INTERNAL MEDICINE

## 2017-12-01 PROCEDURE — 94640 AIRWAY INHALATION TREATMENT: CPT

## 2017-12-01 PROCEDURE — 87449 NOS EACH ORGANISM AG IA: CPT | Performed by: INTERNAL MEDICINE

## 2017-12-01 PROCEDURE — 94760 N-INVAS EAR/PLS OXIMETRY 1: CPT

## 2017-12-01 PROCEDURE — 94668 MNPJ CHEST WALL SBSQ: CPT

## 2017-12-01 PROCEDURE — G8978 MOBILITY CURRENT STATUS: HCPCS

## 2017-12-01 RX ORDER — POTASSIUM CHLORIDE 20 MEQ/1
40 TABLET, EXTENDED RELEASE ORAL DAILY
Status: DISCONTINUED | OUTPATIENT
Start: 2017-12-02 | End: 2017-12-02

## 2017-12-01 RX ADMIN — METOPROLOL SUCCINATE 25 MG: 25 TABLET, FILM COATED, EXTENDED RELEASE ORAL at 08:24

## 2017-12-01 RX ADMIN — PANTOPRAZOLE SODIUM 20 MG: 20 TABLET, DELAYED RELEASE ORAL at 06:05

## 2017-12-01 RX ADMIN — TAMSULOSIN HYDROCHLORIDE 0.4 MG: 0.4 CAPSULE ORAL at 17:44

## 2017-12-01 RX ADMIN — INSULIN LISPRO 1 UNITS: 100 INJECTION, SOLUTION INTRAVENOUS; SUBCUTANEOUS at 12:30

## 2017-12-01 RX ADMIN — METFORMIN HYDROCHLORIDE 500 MG: 500 TABLET, FILM COATED ORAL at 17:44

## 2017-12-01 RX ADMIN — METFORMIN HYDROCHLORIDE 500 MG: 500 TABLET, FILM COATED ORAL at 08:24

## 2017-12-01 RX ADMIN — FLUTICASONE PROPIONATE AND SALMETEROL 1 PUFF: 50; 250 POWDER RESPIRATORY (INHALATION) at 21:22

## 2017-12-01 RX ADMIN — RIVAROXABAN 15 MG: 15 TABLET, FILM COATED ORAL at 17:45

## 2017-12-01 RX ADMIN — SENNOSIDES 8.6 MG: 8.6 TABLET, FILM COATED ORAL at 21:22

## 2017-12-01 RX ADMIN — LEVALBUTEROL HYDROCHLORIDE 1.25 MG: 1.25 SOLUTION, CONCENTRATE RESPIRATORY (INHALATION) at 07:59

## 2017-12-01 RX ADMIN — Medication 250 MG: at 06:06

## 2017-12-01 RX ADMIN — GUAIFENESIN 600 MG: 600 TABLET, EXTENDED RELEASE ORAL at 08:24

## 2017-12-01 RX ADMIN — LEVALBUTEROL HYDROCHLORIDE 1.25 MG: 1.25 SOLUTION, CONCENTRATE RESPIRATORY (INHALATION) at 13:23

## 2017-12-01 RX ADMIN — LEVALBUTEROL HYDROCHLORIDE 1.25 MG: 1.25 SOLUTION, CONCENTRATE RESPIRATORY (INHALATION) at 20:17

## 2017-12-01 RX ADMIN — FLUTICASONE PROPIONATE AND SALMETEROL 1 PUFF: 50; 250 POWDER RESPIRATORY (INHALATION) at 08:24

## 2017-12-01 RX ADMIN — Medication 250 MG: at 17:44

## 2017-12-01 RX ADMIN — DOXYCYCLINE 100 MG: 100 CAPSULE ORAL at 21:22

## 2017-12-01 RX ADMIN — PRAVASTATIN SODIUM 20 MG: 20 TABLET ORAL at 17:45

## 2017-12-01 RX ADMIN — FUROSEMIDE 40 MG: 40 TABLET ORAL at 08:24

## 2017-12-01 RX ADMIN — IPRATROPIUM BROMIDE 0.5 MG: 0.5 SOLUTION RESPIRATORY (INHALATION) at 07:56

## 2017-12-01 RX ADMIN — IPRATROPIUM BROMIDE 0.5 MG: 0.5 SOLUTION RESPIRATORY (INHALATION) at 20:17

## 2017-12-01 RX ADMIN — DOXYCYCLINE 100 MG: 100 CAPSULE ORAL at 08:24

## 2017-12-01 RX ADMIN — GUAIFENESIN 600 MG: 600 TABLET, EXTENDED RELEASE ORAL at 21:22

## 2017-12-01 RX ADMIN — IPRATROPIUM BROMIDE 0.5 MG: 0.5 SOLUTION RESPIRATORY (INHALATION) at 13:24

## 2017-12-01 NOTE — PLAN OF CARE
DISCHARGE PLANNING     Discharge to home or other facility with appropriate resources Progressing        DISCHARGE PLANNING - CARE MANAGEMENT     Discharge to post-acute care or home with appropriate resources Progressing        Knowledge Deficit     Patient/family/caregiver demonstrates understanding of disease process, treatment plan, medications, and discharge instructions Progressing        MUSCULOSKELETAL - ADULT     Maintain or return mobility to safest level of function Progressing        Potential for Falls     Patient will remain free of falls Progressing        Prexisting or High Potential for Compromised Skin Integrity     Skin integrity is maintained or improved Progressing        SKIN/TISSUE INTEGRITY - ADULT     Skin integrity remains intact Progressing

## 2017-12-01 NOTE — PLAN OF CARE
Problem: PHYSICAL THERAPY ADULT  Goal: Performs mobility at highest level of function for planned discharge setting  See evaluation for individualized goals  Treatment/Interventions: Functional transfer training, LE strengthening/ROM, Therapeutic exercise, Endurance training, Patient/family training, Equipment eval/education, Bed mobility, Gait training, Spoke to nursing, OT, Family  Equipment Recommended: Maria A Perera       See flowsheet documentation for full assessment, interventions and recommendations  Prognosis: Fair  Problem List: Decreased strength, Decreased endurance, Impaired balance, Decreased mobility  Assessment: Pt is 81 y o  male seen for PT evaluation on 12/1/2017 [place] [facility] [place] [place]/ [place]dom[place]; pt presented to ED s/p mechanical fall - tripped over his slippers after turning "180 degrees" and fell  Pt was on the floor for roughly 10-11 hours  Pt managed to crawl to phone and call dtr  PT consulted to assess pt's functional mobility and d/c needs  Order placed for PT eval and tx, w/ up and OOB as tolerated order  Comorbidities affecting pt's physical performance at time of assessment include: DM type 2, GERD, BPH, AFib, venous statis dermatitis of L LE, congestive cardiac failure, CKD, ANNIE, osteoporosis, rhabdomyolysis, NSTEMI, COPD  PTA, pt was independent w/ all functional mobility w/ RW vs  SPC, ambulates household distances, retired and lives in 2nd floor apartment w/ elevator access alone  Personal factors affecting pt at time of IE include: ambulating w/ assistive device, inability to ambulate household distances, inability to navigate level surfaces w/o external assistance, unable to perform dynamic tasks in community, positive fall history, decreased initiation and engagement, limited insight into impairments, inability to perform IADLs, inability to perform ADLs and inability to live alone   Please find objective findings from PT assessment regarding body systems outlined above with impairments and limitations including weakness, impaired balance, decreased endurance, gait deviations, decreased activity tolerance, decreased functional mobility tolerance, decreased safety awareness and fall risk, as well as mobility assessment (need for maximal assist w/ all phases of mobility when usually ambulating independently and need for cueing for mobility technique)  The following objective measures performed on IE also reveal limitations: Barthel Index: 40/100 and Modified [place]: 4 (moderate/severe disability)  Pt's clinical presentation is currently unstable/unpredictable  Pt to benefit from continued PT tx to address deficits as defined above and maximize level of functional independent mobility and consistency  From PT/mobility standpoint, recommendation at time of d/c would be STR pending progress in order to facilitate return to PLOF  Barriers to Discharge: Decreased caregiver support     Recommendation: Short-term skilled PT     PT - OK to Discharge: Yes (when medically cleared, if to STR)    See flowsheet documentation for full assessment

## 2017-12-01 NOTE — PROGRESS NOTES
Progress Note - Cardiology     Son Bassett 81 y o  male MRN: [id number]  Unit/Bed#: -01 Encounter: [id number]      Subjective:   States he is feeling slightly better  Less SOB  Denies chest pain, palpitations, lightheadedness  Objective:   Vitals:  Vitals:    12/01/17 0756   BP:    Pulse:    Resp:    Temp:    SpO2: 94%       Body mass index is 26 11 kg/m²  Systolic (42RIL), GEA:569 , Min:136 , ERV:612     Diastolic (75XFH), WSY:76, Min:62, Max:76      Intake/Output Summary (Last 24 hours) at 12/01/17 1030  Last data filed at 12/01/17 0700   Gross per 24 hour   Intake              300 ml   Output              375 ml   Net              -75 ml     Weight (last 2 days)     Date/Time   Weight    12/01/17 0600  80 2 (176 81)    11/30/17 0300  79 7 (175 71)              PHYSICAL EXAM:  General: Patient is not in acute distress  Awake, alert, responding to commands  Head: Normocephalic  Atraumatic  HEENT: Nonicteric  Neck: JVP not raised  Trachea central  No thyromegaly  Respiratory: Bilateral occasional rhonchi  Cardiovascular: RRR  S1 and S2 normal  No murmur, rub or gallop  GI: Abdomen soft, nontender  No guarding or rigidity  Liver and spleen not palpable  Bowel sounds present  Neurologic: Patient is awake, alert, responding to command  Moving all extremities  Integumentary:  Right lower extremity venous insufficiency changes without pitting edema   Lymphatic: No cervical lymphadenopathy  Extremities:  Right lower extremity venous insufficiency changes without pitting edema   Unna boot on LLE      LABORATORY RESULTS:    Results from last 7 days  Lab Units 12/01/17  0450 11/30/17 0437 11/30/17  0008 11/29/17 2024 11/29/17 1734 11/29/17  1409   CK TOTAL U/L 536* 1,084*  --   --   --  2,573*   TROPONIN I ng/mL  --   --  2 37* 3 02* 3 58* 3 62*   CK MB INDEX % <1 0 <1 0  --   --   --  <1 0     CBC with diff:   Results from last 7 days  Lab Units 12/01/17 0450 11/30/17 0437 11/29/17 1734 11/29/17  1408 2017    LDLCALC 52 2016     Lab Results   Component Value Date    TRIG 60 2017    TRIG 51 2017    TRIG 35 2016       Cardiac testing:   Results for orders placed during the hospital encounter of 17   Echo complete with contrast if indicated    Melly Dyson  [place], [place] [address]  [phone]    Transthoracic Echocardiogram  2D, M-mode, Doppler, and Color Doppler    Study date:  2017    Patient: Oracio Johnson  MR number: [id number]  Account number: [de-identified]  : 1935  Age: 81 years  Gender: Male  Status: Inpatient  Location: Bedside  Height: 69 in  Weight: 175 lb  BP: 158/ 77 mmHg    Indications: Heart Failure    Diagnoses: I50 9 - Heart failure, unspecified    Sonographer:  HILDA Martinez,RDCS  Interpreting Physician:  Karly Nava MD  Referring Physician:  Tamika Montalvo MD  Group:  [facility]    SUMMARY    LEFT VENTRICLE:  Ejection fraction was estimated to be 50 %  This study was inadequate for the evaluation of regional wall motion  RIGHT VENTRICLE:  The ventricle was dilated  Systolic function was mildly reduced  LEFT ATRIUM:  The atrium was moderately dilated  RIGHT ATRIUM:  The atrium was dilated  MITRAL VALVE:  There was trace to mild regurgitation  TRICUSPID VALVE:  There was trace regurgitation  HISTORY: PRIOR HISTORY: Atrial Fibrillation, Congestive Heart Failure, NSTEMI, Hypokalemia, Type 2 Diabetes Mellitus, Chronic Kidney Disease, Acute Kidney Injury, Shortness of Breath, Cough, Chronic Obstructive Pulmonary Disease    PROCEDURE: The procedure was performed at the bedside  This was a routine study  The transthoracic approach was used  The study included complete 2D imaging, M-mode, complete spectral Doppler, and color Doppler  The heart rate was 90 bpm,  at the start of the study   Images were obtained from the parasternal, apical, subcostal, and suprasternal notch acoustic windows  Echocardiographic views were limited due to poor acoustic window availability, decreased penetration, and  lung interference  This was a technically difficult study  LEFT VENTRICLE: Size was normal  Ejection fraction was estimated to be 50 %  This study was inadequate for the evaluation of regional wall motion  RIGHT VENTRICLE: The ventricle was dilated  Systolic function was mildly reduced  Wall thickness was normal     LEFT ATRIUM: The atrium was moderately dilated  RIGHT ATRIUM: The atrium was dilated  MITRAL VALVE: There was annular calcification  DOPPLER: There was trace to mild regurgitation  AORTIC VALVE: The valve was not well visualized  DOPPLER: There was no evidence for stenosis  TRICUSPID VALVE: The valve structure was normal  There was normal leaflet separation  DOPPLER: The transtricuspid velocity was within the normal range  There was no evidence for stenosis  There was trace regurgitation  PULMONIC VALVE: Not well visualized  PERICARDIUM: There was no pericardial effusion  The pericardium was normal in appearance  AORTA: The root exhibited normal size  SYSTEM MEASUREMENT TABLES    2D  %FS: 18 8 %  Ao Diam: 3 1 cm  EDV(Teich): 101 9 ml  EF(Teich): 38 8 %  ESV(Teich): 62 4 ml  IVSd: 1 5 cm  LA Area: 25 2 cm2  LA Diam: 5 2 cm  LVEDV MOD A4C: 74 9 ml  LVEF MOD A4C: 55 4 %  LVESV MOD A4C: 33 4 ml  LVIDd: 4 7 cm  LVIDs: 3 8 cm  LVLd A4C: 8 2 cm  LVLs A4C: 6 9 cm  LVPWd: 1 5 cm  RA Area: 25 cm2  RVIDd: 4 cm  SV MOD A4C: 41 5 ml  SV(Teich): 39 6 ml    CW  TR Vmax: 1 8 m/s  TR maxP 3 mmHg    MM  TAPSE: 1 7 cm    [employer] Accredited Echocardiography Laboratory    Prepared and electronically signed by    Keegan Quinn MD  Signed [id number] 13:20:25         Imaging: I have personally reviewed pertinent reports      Procedure: Xr Chest Pa & Lateral    Result Date: 2017  Narrative: CHEST INDICATION: Patient fell  Cough  COMPARISON:  None VIEWS:  Frontal and lateral projections IMAGES:  2 FINDINGS:  Lungs are adequately aerated  Small effusions on lateral view larger on the left and right  Ovoid 3 6 cm density in the right upper lobe of uncertain etiology, may be infectious or neoplastic  Follow-up imaging recommended after treatment  Right basilar infiltrate or atelectasis  Cardiac silhouette is enlarged  Mild vascular congestion  Prominent sean on lateral view likely dilated pulmonary vessels  No significant peribronchial thickening  Atherosclerotic aorta  Bony structures are diffusely demineralized and degenerative  No displaced rib fractures are seen  No pneumothorax  Impression: Ovoid 3 6 cm density in the right upper lobe may be infectious or neoplastic  Infiltrate or atelectasis in the right lung base  Small pleural effusions larger on the left than right  Cardiomegaly with mild vascular congestion but without significant interstitial edema  *Follow-up x-ray recommended after treatment (computed tomography recommended if right upper lobe density persists)  Workstation performed: [id number]     Procedure: Ct Head Without Contrast    Result Date: 11/29/2017  Narrative: CT BRAIN - WITHOUT CONTRAST INDICATION:  Fall  COMPARISON:  None  TECHNIQUE:  CT examination of the brain was performed  In addition to axial images, coronal reformatted images were created and submitted for interpretation  Radiation dose length product (DLP) for this visit:  949 mGy-cm   This examination, like all CT scans performed in the [facility], was performed utilizing techniques to minimize radiation dose exposure, including the use of iterative reconstruction and automated exposure control  IMAGE QUALITY:  Diagnostic  FINDINGS:  PARENCHYMA:  Decreased attenuation is noted in the supratentorial white matter demonstrating an appearance most consistent with moderate microangiopathic change   No intracranial mass, mass effect or midline shift  No CT signs of acute infarction  There is no parenchymal hemorrhage  VENTRICLES AND EXTRA-AXIAL SPACES:  Enlargement of ventricles and extra-axial CSF spaces consistent with cerebral and cerebellar atrophy  VISUALIZED ORBITS AND PARANASAL SINUSES:  Unremarkable  CALVARIUM AND EXTRACRANIAL SOFT TISSUES:   Normal      Impression: No acute intracranial abnormality  Workstation performed: [id number]     Procedure: Ct Chest Wo Contrast    Result Date: 11/30/2017  Narrative: CT CHEST WITHOUT IV CONTRAST INDICATION:  Follow-up pulmonary nodule  COMPARISON: Chest radiographs November 29, 2017 TECHNIQUE: CT examination of the chest was performed without intravenous contrast   Reformatted images were created in axial, sagittal, and coronal planes  Radiation dose length product (DLP) for this visit:  250 mGy-cm   This examination, like all CT scans performed in the [facility], was performed utilizing techniques to minimize radiation dose exposure, including the use of iterative reconstruction and automated exposure control  FINDINGS: LUNGS:  The lungs are well aerated  Large area of groundglass infiltrate is present in the posterior aspect of the right upper lobe  1 7 x 2 3 x 2 1 cm mildly spiculated solid soft tissue mass in the right upper lobe (series 3, image 20 and series 601, image 83)  1 4 x 1 5 x 2 4 cm mildly spiculated soft tissue mass in the posterior aspect of the right upper lobe (series 3, image 20 and series 601, image 90)  These correspond to the abnormality identified on the recent chest radiograph  Scattered groundglass infiltrates in the right middle lobe  Mildly spiculated soft tissue mass in the posterior medial aspect of the right middle lobe measuring 1 7 x 2 2 x 2 3 cm (series 3, image 44 and series 601, image 70)  Calcified granuloma right lower lobe measuring 2 2 cm (series 3, image 44)    0 9 x 0 6 x 0 9 cm mildly spiculated soft tissue mass in the anteromedial aspect of the right lower lobe (series 3, image 44 and series 601, image 92)  Emphysematous changes in the left lung with bullous disease (series 3, image 20)  Vague groundglass infiltrate in the left lower lobe  PLEURA:  Small bilateral pleural effusions, right side greater than left  Compressive atelectasis in the lower lobes bilaterally  HEART/GREAT VESSELS:  The heart is enlarged  Coronary artery calcifications  No evidence of a pericardial effusion  MEDIASTINUM AND ENID:  Prominent mediastinal lymph nodes  Hilar adenopathy unable to be assessed due to the lack of intravenous contrast material  CHEST WALL AND LOWER NECK:       Normal  VISUALIZED STRUCTURES IN THE UPPER ABDOMEN:  Stomach distended and filled with recently ingested food products  The gallbladder is contracted  There are layering calculi in the gallbladder neck  No pericholecystic inflammatory changes  Pancreas appears atrophic  OSSEOUS STRUCTURES: Osseous structures are demineralized  No acute fracture  No destructive osseous lesion  Old healed fractures involving the anterior aspects of the left 4th through 9th ribs  Old healed fractures involving the anterolateral aspects of the left 2nd and 4th through 7th ribs as well as the posterior aspect of the right 10th rib  Old wedge compression fracture involving the superior endplate of the T5 vertebral body  Degenerative changes throughout the cervical, thoracic and upper lumbar spine  Endplate irregularity involving the inferior endplate L2, likely chronic degenerative  Impression: 1  Groundglass infiltrates within the lungs bilaterally, right side greater than left  Findings most compatible with multifocal/multi lobar pneumonia  2   Multiple right-sided pulmonary nodules  Although the findings may be infectious, pulmonary neoplasm, either primary or metastatic, not excluded    Recommend adequate treatment for pneumonia with follow-up CT scan in one month  Also a consideration would be diagnostic right-sided thoracentesis with evaluation of the pleural fluid for cell count and cytology  CT-guided biopsy would be difficult, given their location  Bronchoscopy with bronchial washings from the right upper lobe is also a consideration  3   Cholelithiasis without CT evidence of acute cholecystitis  I personally discussed this study with Brandi Thompson on 11/30/2017 11:52 AM  Workstation performed: [id number]     Procedure: Ct Cervical Spine Without Contrast    Result Date: 11/29/2017  Narrative: CT CERVICAL SPINE - WITHOUT CONTRAST INDICATION: Fall  COMPARISON: None  TECHNIQUE:  CT examination of the cervical spine was performed without intravenous contrast   Contiguous axial images were obtained  Sagittal and coronal reconstructions were performed  Radiation dose length product (DLP) for this visit:  481 mGy-cm   This examination, like all CT scans performed in the [facility], was performed utilizing techniques to minimize radiation dose exposure, including the use of iterative reconstruction and automated exposure control  IMAGE QUALITY:  Diagnostic  FINDINGS: ALIGNMENT:  Normal alignment of the cervical spine  No subluxation  VERTEBRAL BODIES:  No fracture  DEGENERATIVE CHANGES:  Mild multilevel cervical degenerative changes are noted without critical central canal stenosis  PREVERTEBRAL AND PARASPINAL SOFT TISSUES: Normal         THORACIC INLET:  Emphysema is noted at the lung apices  Impression: No cervical spine fracture or traumatic malalignment       Workstation performed: [id number]         Meds/Allergies   current meds:   Current Facility-Administered Medications   Medication Dose Route Frequency    acetaminophen (TYLENOL) tablet 650 mg  650 mg Oral Q6H PRN    doxycycline hyclate (VIBRAMYCIN) capsule 100 mg  100 mg Oral Q12H [address]    fluticasone-salmeterol (ADVAIR) 250-50 mcg/dose inhaler 1 puff  1 puff Inhalation Q12H [address]    furosemide (LASIX) tablet 40 mg  40 mg Oral Daily    guaiFENesin (MUCINEX) 12 hr tablet 600 mg  600 mg Oral Q12H ERICA    insulin lispro (HumaLOG) 100 units/mL subcutaneous injection 1-5 Units  1-5 Units Subcutaneous TID AC    insulin lispro (HumaLOG) 100 units/mL subcutaneous injection 1-5 Units  1-5 Units Subcutaneous HS    ipratropium (ATROVENT) 0 02 % inhalation solution 0 5 mg  0 5 mg Nebulization TID    And    levalbuterol (XOPENEX) inhalation solution 1 25 mg  1 25 mg Nebulization TID    ipratropium-albuterol (DUO-NEB) 0 5-2 5 mg/mL inhalation solution 3 mL  3 mL Nebulization Q4H PRN    magnesium gluconate (MAGONATE) tablet 250 mg  250 mg Oral BID AC    metFORMIN (GLUCOPHAGE) tablet 500 mg  500 mg Oral BID With Meals    metoprolol succinate (TOPROL-XL) 24 hr tablet 25 mg  25 mg Oral Daily    pantoprazole (PROTONIX) EC tablet 20 mg  20 mg Oral Early Morning    [START ON 12/2/2017] potassium chloride (K-DUR,KLOR-CON) CR tablet 40 mEq  40 mEq Oral Daily    pravastatin (PRAVACHOL) tablet 20 mg  20 mg Oral Daily With Dinner    rivaroxaban (XARELTO) tablet 15 mg  15 mg Oral Daily With Breakfast    senna (SENOKOT) tablet 8 6 mg  1 tablet Oral HS    tamsulosin (FLOMAX) capsule 0 4 mg  0 4 mg Oral Daily With Dinner     Prescriptions Prior to Admission   Medication    alendronate (FOSAMAX) 70 mg tablet    fluticasone-salmeterol (ADVAIR DISKUS) 250-50 mcg/dose inhaler    furosemide (LASIX) 40 mg tablet    Lancets (FREESTYLE) lancets    magnesium chloride-calcium (SLOW-MAG) 71 5-119 mg    metFORMIN (GLUCOPHAGE-XR) 500 mg 24 hr tablet    metoprolol succinate (TOPROL-XL) 25 mg 24 hr tablet    potassium chloride (KLOR-CON M20) 20 mEq tablet    rivaroxaban (XARELTO) 15 mg tablet    simvastatin (ZOCOR) 20 mg tablet    tamsulosin (FLOMAX) 0 4 mg    Omeprazole 20 MG TBEC           Assessment and Plan:     1    NSTEMI, likely type 2 due to fall and rhabdomyolysis but primary cardiac cause cannot be ruled out  Troponins are trending down  Heparin drip was held off due to the clinical picture being more convincing for rhabdomyolysis + patient will be at high bleeding risk due to being on Xarelto and initial poor renal function (since recovered)  ECHO with EF 50%       2   AFib/flutter: mostly rate controlled with occasional wide complex beats (PVCs vs aberrantly conducted beats)  Rate controlled on Toprol  Continue Xarelto for anticoagulation      3  Chronic systolic heart failure:  Continue Lasix, Toprol  Will consider adding Ace inhibitor or ARB once renal function stabilizes      4   CAD S/P stents x2:  Stable  Continue statin, beta-blocker  No aspirin due to high bleeding risk along with Xarelto     5  Hypertension:  Stable  Continue all antihypertensive medications      6  Hyperlipidemia:  Continue statin       ** Please Note: Dictation voice to text software may have been used in the creation of this document   **

## 2017-12-01 NOTE — SPEECH THERAPY NOTE
Consult received, chart reviewed  Pt p/w CAP and has been tolerating Regular/Thin Liquid diet x6 meals  D/w CJ Mujica who reports that pt passed dysphagia screening and p/w no apparent deficits in speech, language, or swallowing  Full ST eval is not warranted at this time  Please reconsult with any concerns

## 2017-12-01 NOTE — CONSULTS
Progress Note - Wound   Kandy Shearing 81 y o  male MRN: [id number]  Unit/Bed#: -01 Encounter: [id number]      Assessment: patient is  81year old male who presents with fall  Admitted with rhabdomyolysis  Patient has a history of - a-fib, CHF, GERD, DM, venous stasis dermatitis, BPH, HLD, CKD, MI and COPD  Wound care consulted for lower extremities  Patient seen in bed, agreeable to assessment  Patient incontinent of bowel and bladder  Nutrition is following along with this patient  Assist of one with turning  R lower extremity with venous stasis dermatitis  Skin is hyperpigmented dry and scaly  No wounds noted  R heel is intact with no redness dry skin noted  Recommend hydraguard to heel and skin nourishing cream to the entire skin  L lower extremity with UNNA boot on  Patient refused wound care nurse to remove it, since we do not have UNNA boots in stock to replace the dressing  Patient states he sees Dr Felix Rosenthal with dermatology weekly  The UNNA boot when on Monday and he has an appointment on 12/4 to go back to get it replaced  Patient adamantly refused this to be removed for assessment  Patient denies having wounds to this extremity, but states, "it's finally getting better don't mess with it!"  Would consider dermatology consult  Dry scabs and ecchymosis noted to b/l upper extremities  Patient states that these are from his fall  No wounds noted to buttocks or sacrum  Skin is intact with areas blanchable redness  Sacrum and buttocks with scarring / pink healed tissue  Patient confirms that he has had wounds in the past on his bottom  Recommend hydraguard cream for prevention and protection  Educated patient on turning, repositioning and weight shifting frequently to offload pressure to prevent skin breakdown  Patient verbalized understanding of plan of care  Wound care will sign off as patient has no active wounds  Primary nurse aware of plan of care  Plan:   1   Apply hydraguard to b/l heels, buttocks and sacrum TID and PRN for prevention and protection  2  Apply skin nourishing cream to the entire skin daily for moisture   3  Elevate heels off of bed with pillows to offload   4  Turn and reposition patient every 2 hours   5  Soft care cushion to chair when OOB  6  Consider dermatology consult     Subjective:  "If you can't put it back on then don't take it off! "    Objective:    Vitals: Blood pressure 163/73, pulse 73, temperature 98 °F (36 7 °C), temperature source Oral, resp  rate 18, height 5' 9" (1 753 m), weight 80 2 kg (176 lb 12 9 oz), SpO2 94 %  ,Body mass index is 26 11 kg/m²  Recommendations written as orders    Bree Malagon RN BSN

## 2017-12-01 NOTE — CONSULTS
Consultation - Infectious Disease   Naye Avery 81 y o  male MRN: [id number]  Unit/Bed#: -01 Encounter: [id number]      IMPRESSION & RECOMMENDATIONS:   Impression/Recommendations:  1  Bilateral opacities  CT chest reveals bilateral ground-glass opacities concerning for multilobar pneumonia along with multiple right-sided pulmonary nodules  May be multilobar community-acquired pneumonia, although clinical presentation not really consistent with pneumonia  Other possibility is underlying malignancy  Currently, patient is breathing comfortably with improving cough  O2 sat stable on room air  Remains afebrile  Hemodynamically stable, nontoxic appearing  Due to a documented allergy to Keflex, patient was started on doxycycline on admission  As there seems to have been some improvement in shortness of breath and cough on the doxycycline, we can continue to treat as a community-acquired pneumonia for short course, with plan for repeat imaging to reassess CT findings     -continue doxycycline 100 mg p  o  twice a day  Plan to give for 7 days total, through 12/6   -recommend follow-up with pulmonology  Would repeat CT chest as outpatient to reassess bilateral opacities  -monitor respiratory status  -monitor temperatures and white blood cell count  -monitor hemodynamics    2  Leukocytosis  Patient had a mild leukocytosis on admission  It has resolved  Could be secondary to another #1 verses stress from recent fall and rhabdomyolysis  -monitor WBC count    3  ANNIE  Could be secondary to #1 versus rhabdomyolysis  Creatinine already improved  -continue to monitor    4  Elevated troponin/NSTEMI  Likely type 2 per Cardiology secondary to recent fall and rhabdomyolysis  Antibiotics:  Doxycycline D2    Thank you for this consultation  We will follow along with you      HISTORY OF PRESENT ILLNESS:  Reason for Consult:  Pneumonia    HPI: Naye Avery is a 81 y o  male with history of tobacco abuse, COPD, diabetes admitted on  status post mechanical fall  Patient had tripped and fell on his kitchen floor  He was able to crawl to the telephone and call his daughter 12 hours after lying on the ground  He was taken to the ED  CT head was negative  Chest x-ray was concerning for ovoid right upper lobe mass  CT chest concerning for multilobar pneumonia  Patient had complained of worsening shortness of breath with increased productive cough over the past few weeks  He is started on doxycycline on admission  Today, the patient states he does not feel short of breath  Cough it is improving  Denies fevers or chills  Denies URI symptoms  No nausea, vomiting, diarrhea, urinary symptoms  REVIEW OF SYSTEMS:    A complete system-based review of systems is otherwise negative  PAST MEDICAL HISTORY:  Past Medical History:   Diagnosis Date    Cardiac disease     [facility]     [facility] mellitus [facility]     GERD (gastroesophageal reflux disease)     Hyperlipidemia     Hypertension     Pulmonary nodules 2017    Renal disorder      Past Surgical History:   Procedure Laterality Date    CORONARY ANGIOPLASTY WITH STENT PLACEMENT      EYE SURGERY         FAMILY HISTORY:  Non-contributory    SOCIAL HISTORY:  History   Alcohol Use No     History   Drug Use No     History   Smoking Status    Former Smoker    Packs/day: 2 00    Quit date: 1964   Smokeless Tobacco    Never Used       ALLERGIES:  Allergies   Allergen Reactions    Cephalexin        MEDICATIONS:  All current active medications have been reviewed      PHYSICAL EXAM:  Vitals:  HR:  [67-80] 73  Resp:  [18-19] 18  BP: (136-163)/(62-76) 163/73  SpO2:  [91 %-99 %] 94 %  Temp (24hrs), Av °F (36 7 °C), Min:97 6 °F (36 4 °C), Max:98 3 °F (36 8 °C)  Current: Temperature: 98 °F (36 7 °C)     Physical Exam:  General:  Well-nourished, well-developed, in no acute distress  Eyes:  Conjunctive clear with no hemorrhages or effusions  Oropharynx:  No ulcers, no lesions  Neck:  Supple, no lymphadenopathy  Lungs:  Clear to auscultation bilaterally, no accessory muscle use, decreased breath sounds bilaterally  Cardiac:  Regular rate and rhythm, no murmurs  Abdomen:  Soft, non-tender, non-distended  Extremities:  No peripheral cyanosis, clubbing, or edema  Skin:  No rashes, no ulcers  Neurological:  Moves all four extremities spontaneously, sensation grossly intact    LABS, IMAGING, & OTHER STUDIES:  Lab Results:  I have personally reviewed pertinent labs  Results from last 7 days  Lab Units 12/01/17  0450 11/30/17  0437 11/29/17  1409   SODIUM mmol/L 139 138 140   POTASSIUM mmol/L 3 5 3 3* 4 0   CHLORIDE mmol/L 107 105 103   CO2 mmol/L 21 24 25   ANION GAP mmol/L 11 9 12   BUN mg/dL 20 26* 24   CREATININE mg/dL 1 07 1 34* 1 62*   EGFR ml/min/1 73sq m 65 49 39   GLUCOSE RANDOM mg/dL 118 90 93   CALCIUM mg/dL 8 0* 8 1* 9 1   AST U/L  --   --  82*   ALT U/L  --   --  24   ALK PHOS U/L  --   --  73   TOTAL PROTEIN g/dL  --   --  7 1   ALBUMIN g/dL  --   --  2 6*   BILIRUBIN TOTAL mg/dL  --   --  2 50*       Results from last 7 days  Lab Units 12/01/17  0450 11/30/17  0437 11/29/17  1734 11/29/17  1409   WBC Thousand/uL 7 82 10 85*  --  12 93*   HEMOGLOBIN g/dL 11 8* 12 4  --  14 2   PLATELETS Thousands/uL 153 150 175 186       Results from last 7 days  Lab Units 12/01/17  0312   LEGIONELLA URINARY ANTIGEN  Negative       Imaging Studies:   I have personally reviewed pertinent imaging study reports and images in PACS  CT head negative  Chest x-ray showed ovoid 3 6 cm density in the right upper lobe with infiltrate in the right lung base  CT chest shows ground-glass infiltrates within the lungs bilaterally right greater than left compatible with multi lobar pneumonia  Multiple right-sided pulmonary nodules  EKG, Pathology, and Other Studies:   I have personally reviewed pertinent reports

## 2017-12-01 NOTE — PHYSICAL THERAPY NOTE
Physical Therapy Evaluation    Patient's Name: Brett Rosado    Admitting Diagnosis  Rhabdomyolysis [M62 82]  Hyperbilirubinemia [E80 6]  Arm pain [M79 603]  Contusion, multiple sites [T07  XXXA]  Abnormal EKG [R94 31]  Elevated TSH [R94 6]  Elevated troponin [R74 8]  NSTEMI (non-ST elevated myocardial infarction) (HCC) [I21 4]  Anticoagulant long-term use [Z79 01]  ANNIE (acute kidney injury) ([facility] ) [N17 9]  Abrasion of right hand, initial encounter [S60 511A]  Fall on same level from slipping, tripping or stumbling, initial encounter [W01  0XXA]  Venous stasis dermatitis of left lower extremity [I87 2]  Skin tear of left forearm without complication, initial encounter [S51 812A]    Problem List  Patient Active Problem List   Diagnosis    Type 2 diabetes mellitus (HCC)    GERD (gastroesophageal reflux disease)    BPH (benign prostatic hyperplasia)    Atrial fibrillation (HCC)    Venous stasis dermatitis of left lower extremity    Congestive cardiac failure (HCC)    CKD (chronic kidney disease)    Acute kidney injury ([address] )    Osteoporosis    Fall    Shortness of breath    Cough    Rhabdomyolysis    NSTEMI (non-ST elevated myocardial infarction) ([address] )    COPD (chronic obstructive pulmonary disease) ([address] )    Hypokalemia    Abnormal chest x-ray    Pulmonary nodules    Pneumonia       Past Medical History  Past Medical History:   Diagnosis Date    Cardiac disease     COPD (chronic obstructive pulmonary disease) ([address] )     Diabetes mellitus ([address] )     GERD (gastroesophageal reflux disease)     Hyperlipidemia     Hypertension     Pulmonary nodules 12/1/2017    Renal disorder        Past Surgical History  Past Surgical History:   Procedure Laterality Date    CORONARY ANGIOPLASTY WITH STENT PLACEMENT      EYE SURGERY        12/01/17 1321   Note Type   Note type Eval/Treat   Pain Assessment   Pain Assessment No/denies pain   Pain Score No Pain   Home Living   Type of Home Apartment  (2nd floor apartment/ [employer] ([facility])   Home Layout Elevator; One level;Performs ADLs on one level  (2nd floor apartment)   Bathroom Shower/Tub Tub/shower unit   Bathroom Toilet Standard   Bathroom Equipment Grab bars in shower;Grab bars around toilet; Shower chair   [address] Walker;Cane;Hospital bed  (life alert and phone not accessible at time of fall)   Prior Function   Level of [place] Independent with ADLs and functional mobility   Lives With Alone   Receives Help From Family  (2 daughters that live locally)   ADL Assistance Independent   IADLs Independent   Falls in the last 6 months 1 to 4  (March/ 2017 due to low blood sugar/ 2nd prior to admission)   Vocational Retired   Comments does not drive/ daughters assist with shopping and transportation   Restrictions/Precautions   [place] Bearing Precautions Per Order No   Braces or Orthoses (Unna Boot LLE- changed every 7 days)   Other Precautions Chair Alarm; Bed Alarm;Telemetry; Fall Risk;Hard of hearing   General   Family/Caregiver Present Yes  (pt's dtr)   Cognition   Arousal/Participation Alert   Attention Attends with cues to redirect   Orientation Level Oriented X4   Memory Within functional limits   Following Commands Follows all commands and directions without difficulty   Comments [place]   RUE Assessment   RUE Assessment (refer to OT eval for details)   LUE Assessment   LUE Assessment (refer to OT eval for details)   RLE Assessment   RLE Assessment WFL   Strength RLE   R Hip Flexion 4-/5   R Knee Extension 4/5   LLE Assessment   LLE Assessment WFL   Strength LLE   L Hip Flexion 4-/5   L Knee Extension 4/5   Coordination   Movements are Fluid and Coordinated 1   Light Touch   RLE Light Touch Grossly intact   LLE Light Touch Not tested  (unable to assess 2/2 unna boot applied)   Bed Mobility   Rolling R Unable to assess   Additional Comments Patient received OOB to Recliner, Nursing transferred patient OOB with assist    Transfers   Sit to Stand 2  Maximal assistance   Additional items Assist x 1; Armrests; Increased time required;Verbal cues   Stand to Sit 3  Moderate assistance   Additional items Assist x 1; Armrests; Increased time required;Verbal cues   Ambulation/Elevation   Gait pattern Decreased foot clearance;Shuffling; Improper Weight shift; Step to;Excessively slow  (grossly unsteady)   Gait Assistance 2  Maximal assist   Additional items Assist x 1;Verbal cues; Tactile cues   Assistive Device Rolling walker   Distance 1' forward/backward   Balance   Static Sitting Fair +   Dynamic Sitting Fair   Static Standing Poor +   Dynamic Standing Poor   Ambulatory Poor   Endurance Deficit   Endurance Deficit Yes   Activity Tolerance   Activity Tolerance Patient limited by fatigue  (frequent coughing)   Nurse Made Aware Yes, RN Neisha Lung verbalized pt appropriate for PT session, made aware of outcomes/recs   Assessment   Prognosis Fair   Problem List Decreased strength;Decreased endurance; Impaired balance;Decreased mobility   Assessment Pt is 81 y o  male seen for PT evaluation on 12/1/2017 s/p admit to [facility] on 11/29/2017 w/ Rhabdomyolysis; pt presented to ED s/p mechanical fall - tripped over his slippers after turning "180 degrees" and fell  Pt was on the floor for roughly 10-11 hours  Pt managed to crawl to phone and call dtr  PT consulted to assess pt's functional mobility and d/c needs  Order placed for PT eval and tx, w/ up and OOB as tolerated order  Comorbidities affecting pt's physical performance at time of assessment include: DM type 2, GERD, BPH, AFib, venous statis dermatitis of L LE, congestive cardiac failure, CKD, ANNIE, osteoporosis, rhabdomyolysis, NSTEMI, COPD  PTA, pt was independent w/ all functional mobility w/ RW vs  SPC, ambulates household distances, retired and lives in 2nd floor apartment w/ elevator access alone   Personal factors affecting pt at time of IE include: ambulating w/ assistive device, inability to ambulate household distances, inability to navigate level surfaces w/o external assistance, unable to perform dynamic tasks in community, positive fall history, decreased initiation and engagement, limited insight into impairments, inability to perform IADLs, inability to perform ADLs and inability to live alone  Please find objective findings from PT assessment regarding body systems outlined above with impairments and limitations including weakness, impaired balance, decreased endurance, gait deviations, decreased activity tolerance, decreased functional mobility tolerance, decreased safety awareness and fall risk, as well as mobility assessment (need for maximal assist w/ all phases of mobility when usually ambulating independently and need for cueing for mobility technique)  The following objective measures performed on IE also reveal limitations: Barthel Index: 40/100 and Modified [place]: 4 (moderate/severe disability)  Pt's clinical presentation is currently unstable/unpredictable  Pt to benefit from continued PT tx to address deficits as defined above and maximize level of functional independent mobility and consistency  From PT/mobility standpoint, recommendation at time of d/c would be STR pending progress in order to facilitate return to PLOF     Barriers to Discharge Decreased caregiver support   Goals   Patient Goals "to get stronger"   STG Expiration Date 12/11/17   Short Term Goal #1 In 7-10 days: Increase bilateral LE strength 1/2 grade to facilitate independent mobility, Perform all bed mobility tasks with distant S to decrease caregiver burden, Perform all transfers with distant S to improve independence, Ambulate > 50 ft  with least restrictive assistive device with distant S w/o LOB and w/ normalized gait pattern 100% of the time, Increase all balance 1/2 grade to decrease risk for falls, Complete exercise program independently and Tolerate 4 hr OOB to faciliate upright tolerance Treatment Day 0   Plan   Treatment/Interventions Functional transfer training;LE strengthening/ROM; Therapeutic exercise; Endurance training;Patient/family training;Equipment eval/education; Bed mobility;Gait training;Spoke to nursing;OT;Family   PT Frequency 5x/wk   Recommendation   Recommendation Short-term skilled PT   Equipment Recommended Walker   PT - OK to Discharge Yes  (when medically cleared, if to STR)   Modified [place] Scale   Modified [place] Scale 4   Barthel Index   Feeding 10   Bathing 0   Grooming Score 0   Dressing Score 5   Bladder Score 5   Bowels Score 5   Toilet Use Score 5   Transfers (Bed/Chair) Score 10   Mobility (Level Surface) Score 0   Stairs Score 0   Barthel Index Score 40           Omar Gaffney, PT

## 2017-12-01 NOTE — PROGRESS NOTES
Progress Note - Adwoa Braxton 81 y o  male MRN: [id number]  Unit/Bed#: -01 Encounter: [id number]    Subjective:   Euell Lute is feeling better, cough has decreased  He denies chest pain, shortness of breath or palpitations  He denies fevers or chills  He denies PND or orthopnea  He is not having any nausea or vomiting  He has ambulated to the bathroom but requires considerable assistance  He denies any diarrhea  Urination is adequate, he denies dysuria and feels he is emptying the bladder fine  All other ROS are negative  Objective:   Vitals: Blood pressure 163/73, pulse 73, temperature 98 °F (36 7 °C), temperature source Oral, resp  rate 18, height 5' 9" (1 753 m), weight 80 2 kg (176 lb 12 9 oz), SpO2 94 %  ,Body mass index is 26 11 kg/m²  SPO2 RA Rest    Flowsheet Row ED to Hosp-Admission (Current) from 11/29/2017 in [facility] 2nd Floor Med Surg Unit   SpO2  94 %   SpO2 Activity  At Rest   O2 Device  None (Room air)   O2 Flow Rate  No data        I&O:     Intake/Output Summary (Last 24 hours) at 12/01/17 0845  Last data filed at 12/01/17 0700   Gross per 24 hour   Intake              780 ml   Output              375 ml   Net              405 ml         Physical Exam:       General Appearance:    Alert, cooperative, no distress   Head:    Normocephalic, without obvious abnormality, atraumatic   Eyes:    PERRL, conjunctiva/corneas clear, EOM's intact               Neck:   Supple, symmetrical, trachea midline, no JVD   Lungs:     Scattered rhonchi and prolonged expiratory phase   Heart:    Regular rate and rhythm, S1 and S2 normal, 1/6 systolic murmur, no rub   or gallop   Abdomen: Soft, non-tender, positive bowel sounds, no masses, no organomegaly   Extremities:  Right lower extremity venous insufficiency changes without pitting edema, Unna boot on left lower extremity, the toenails are thickened and mycotic   Neurologic:     CNII-XII intact        Invasive Devices     Peripheral Intravenous Line            Peripheral IV 11/29/17 Left Antecubital 1 day                      Social History  reviewed  History reviewed  No pertinent family history   reviewed    Meds:  Current Facility-Administered Medications   Medication Dose Route Frequency Provider Last Rate Last Dose    acetaminophen (TYLENOL) tablet 650 mg  650 mg Oral Q6H PRN BHANU Waters        doxycycline hyclate (VIBRAMYCIN) capsule 100 mg  100 mg Oral Q12H [address] Momin MD   100 mg at 12/01/17 0824    fluticasone-salmeterol (ADVAIR) 250-50 mcg/dose inhaler 1 puff  1 puff Inhalation Q12H [address] BHANU Abbasi   1 puff at 12/01/17 0824    furosemide (LASIX) tablet 40 mg  40 mg Oral Daily BHANU Abbasi   40 mg at 12/01/17 0824    guaiFENesin (MUCINEX) 12 hr tablet 600 mg  600 mg Oral Q12H [address] Nahun Benites PA-C   600 mg at 12/01/17 0824    insulin lispro (HumaLOG) 100 units/mL subcutaneous injection 1-5 Units  1-5 Units Subcutaneous TID AC BHANU Waters        insulin lispro (HumaLOG) 100 units/mL subcutaneous injection 1-5 Units  1-5 Units Subcutaneous HS BHANU Abbasi   1 Units at 11/29/17 2207    ipratropium (ATROVENT) 0 02 % inhalation solution 0 5 mg  0 5 mg Nebulization TID Nahun Beniets PA-C   0 5 mg at 12/01/17 1661    And    levalbuterol (XOPENEX) inhalation solution 1 25 mg  1 25 mg Nebulization TID Nahun Benites PA-C   1 25 mg at 12/01/17 0759    ipratropium-albuterol (DUO-NEB) 0 5-2 5 mg/mL inhalation solution 3 mL  3 mL Nebulization Q4H PRN Av Momin MD        magnesium gluconate (MAGONATE) tablet 250 mg  250 mg Oral BID AC BHANU Waters   250 mg at 12/01/17 0606    metFORMIN (GLUCOPHAGE) tablet 500 mg  500 mg Oral BID With Meals Av Momin MD   500 mg at 12/01/17 0824    metoprolol succinate (TOPROL-XL) 24 hr tablet 25 mg  25 mg Oral Daily BHANU Waters   25 mg at 12/01/17 0824    pantoprazole (PROTONIX) EC tablet 20 mg  20 mg Oral Early Morning Misty Ga Olszewski, MAKENNANP   20 mg at 12/01/17 4340    [START ON 12/2/2017] potassium chloride (K-DUR,KLOR-CON) CR tablet 40 mEq  40 mEq Oral Daily María Campbell MD        pravastatin (PRAVACHOL) tablet 20 mg  20 mg Oral Daily With [employer], CRNP   20 mg at 11/30/17 1550    rivaroxaban (XARELTO) tablet 15 mg  15 mg Oral Daily With Breakfast [id number] [address] [employer] Olszewski, CRNP   15 mg at 11/30/17 1550    senna (SENOKOT) tablet 8 6 mg  1 tablet Oral HS Misty Vega CRNP   8 6 mg at 11/30/17 2042    tamsulosin (FLOMAX) capsule 0 4 mg  0 4 mg Oral Daily With [place] [employer] ARCHIE Vega MAKENNANP   0 4 mg at 11/30/17 1550      Prescriptions Prior to Admission   Medication    alendronate (FOSAMAX) 70 mg tablet    fluticasone-salmeterol (ADVAIR DISKUS) 250-50 mcg/dose inhaler    furosemide (LASIX) 40 mg tablet    Lancets (FREESTYLE) lancets    magnesium chloride-calcium (SLOW-MAG) 71 5-119 mg    metFORMIN (GLUCOPHAGE-XR) 500 mg 24 hr tablet    metoprolol succinate (TOPROL-XL) 25 mg 24 hr tablet    potassium chloride (KLOR-CON M20) 20 mEq tablet    rivaroxaban (XARELTO) 15 mg tablet    simvastatin (ZOCOR) 20 mg tablet    tamsulosin (FLOMAX) 0 4 mg    Omeprazole 20 MG TBEC       Labs:    Results from last 7 days  Lab Units 12/01/17 0450 11/30/17 0437 11/29/17  1734 11/29/17  1409   WBC Thousand/uL 7 82 10 85*  --  12 93*   HEMOGLOBIN g/dL 11 8* 12 4  --  14 2   HEMATOCRIT % 38 4 39 9  --  45 8   PLATELETS Thousands/uL 153 150 175 186   NEUTROS PCT % 80*  --   --  87*   LYMPHS PCT % 12*  --   --  8*   MONOS PCT % 6  --   --  4   EOS PCT % 1  --   --  0       Results from last 7 days  Lab Units 12/01/17 0450 11/30/17  0437 11/29/17  1409   SODIUM mmol/L 139 138 140   POTASSIUM mmol/L 3 5 3 3* 4 0   CHLORIDE mmol/L 107 105 103   CO2 mmol/L 21 24 25   BUN mg/dL 20 26* 24   CREATININE mg/dL 1 07 1 34* 1 62*   CALCIUM mg/dL 8 0* 8 1* 9 1   TOTAL PROTEIN g/dL  --   --  7 1   BILIRUBIN TOTAL mg/dL  --   -- 2 50*   ALK PHOS U/L  --   --  73   ALT U/L  --   --  24   AST U/L  --   --  82*   GLUCOSE RANDOM mg/dL 118 90 93     Lab Results   Component Value Date    TROPONINI 2 37 (H) 11/30/2017    TROPONINI 3 02 (H) 11/29/2017    TROPONINI 3 58 (H) 11/29/2017    CKTOTAL 536 (H) 12/01/2017    CKTOTAL 1,084 (H) 11/30/2017    CKTOTAL 2,573 (H) 11/29/2017       Results from last 7 days  Lab Units 11/29/17  1409   INR  1 80*     No results found for: Jovanna Gonzalez, SPUTUMCULTUR    Imaging:  No results found for this or any previous visit  Results for orders placed during the hospital encounter of 11/29/17   XR chest pa & lateral    Narrative CHEST     INDICATION:  Patient fell  Cough  COMPARISON:  None    VIEWS:  Frontal and lateral projections    IMAGES:  2    FINDINGS:  Lungs are adequately aerated  Small effusions on lateral view larger on the left and right  Ovoid 3 6 cm density in the right upper lobe of uncertain etiology, may be infectious or neoplastic  Follow-up imaging recommended after treatment  Right basilar infiltrate or atelectasis  Cardiac silhouette is enlarged  Mild vascular congestion  Prominent sean on lateral view likely dilated pulmonary vessels  No significant peribronchial thickening  Atherosclerotic aorta  Bony structures are diffusely demineralized and degenerative  No displaced rib fractures are seen  No pneumothorax  Impression Ovoid 3 6 cm density in the right upper lobe may be infectious or neoplastic  Infiltrate or atelectasis in the right lung base  Small pleural effusions larger on the left than right  Cardiomegaly with mild vascular congestion but without significant interstitial edema  *Follow-up x-ray recommended after treatment (computed tomography recommended if right upper lobe density persists)        Workstation performed: [id number]         VTE Pharmacologic Prophylaxis: Xarelto      Code Status:   Level 1 - Full Code    Assessment:  Principal Problem:    Rhabdomyolysis  Active Problems:    Type 2 diabetes mellitus (HCC)    GERD (gastroesophageal reflux disease)    BPH (benign prostatic hyperplasia)    Atrial fibrillation (HCC)    Venous stasis dermatitis of left lower extremity    Congestive cardiac failure (HCC)    CKD (chronic kidney disease)    Acute kidney injury ([facility] [address] )    Osteoporosis    Fall    Shortness of breath    Cough    NSTEMI (non-ST elevated myocardial infarction) (HCC)    COPD (chronic obstructive pulmonary disease) (HCC)    Hypokalemia    Abnormal chest x-ray    Pulmonary nodules    Pneumonia  Resolved Problems:    * No resolved hospital problems  *    Plan:  · Rhabdomyolysis-CKs continued to improve, no significant myalgia  · Non ST elevation MI-cardiology following, echo reviewed with EF 50% and no focal wall motion abnormalities  · Multi lobar pneumonia with multiple pulmonary nodules-clinically some improvement with doxycycline, patient listed with cephalosporin allergy though he is unaware of reaction  There are multiple spiculated right-sided nodules up to 2 4 cm in diameter raising the concern for malignancy  I discussed with patient and then had lengthy discussion with daughter by phone regarding his current functional status and comorbidities and we are in agreement that a conservative approach would be optimal at this time  I plan to treat this pneumonia and follow-up CT in 4-6 weeks  If there is no change in nodules or worsening then pursue minimally invasive workup to include thoracentesis and/or bronchoscopy  Would consider oncology evaluation prior to CT-guided biopsy given high risk for pneumothorax and patient's poor functional status  Infectious disease consultation pending to guide antibiotic choice    Additionally, patient's daughter, Nadine Sandoval notes that he had workup for pulmonary nodules at [facility] in 2006 and thinks he may have CT scans from [facility] and I will try to track them down for comparison  · Type 2 diabetes mellitus-renal function has improved, will resume metformin and continue sliding scale  · Acute on chronic kidney injury, GFR 65 today, continue to monitor  · Hypokalemia has been orally repleted, will decrease to 40 mEq daily and follow  · CHF-mildly reduced systolic function, Cardiology is following, continue on once daily furosemide  · COPD with exacerbation-continue nebulizers, I see no indication for steroids at this time  · Atrial fibrillation-heart rate is controlled, he is in sinus rhythm and anticoagulated with Xarelto  · Weakness, debility, recent fall-patient will most certainly need short-term rehabilitation, await PT evaluation      Olayinka Sanchez MD  12/1/2017,8:45 AM

## 2017-12-01 NOTE — PROGRESS NOTES
Progress Note - Pulmonary   Chryl Senior 81 y o  male MRN: [id number]  Unit/Bed#: -01 Encounter: [id number]    Assessment:  Abnormal CT of the chest with nodular opacities and ground-glass opacities  Likely community-acquired pneumonia  COPD without exacerbation    Plan:  Urine streptococcal and Legionella antigen negative  Continue with doxycycline for a total of 1 week  Needs repeat CT of the chest in 4-6 weeks for resolution of the opacities  Reviewed documents from [facility] did not see any CT of the chest there, there was a CT of the abdomen from 2016 the was no documentation of any opacity seen in the lower lobes of the lungs then  He did have a few chest x-rays done at [facility] in the past 2 years there was no reporting of these opacities seen then  Given improvement in the symptoms currently, will completed doxycycline for 1 week and follow-up with repeat imaging   Continue with nebulizer treatments and his long-acting bronchodilators  Continue with Mucinex and incentive spirometry and flutter valve  Subjective: The patient states he feels slightly better since admission  Objective:     Vitals: Blood pressure 153/70, pulse 66, temperature 97 5 °F (36 4 °C), temperature source Oral, resp  rate 18, height 5' 9" (1 753 m), weight 80 2 kg (176 lb 12 9 oz), SpO2 94 %  ,Body mass index is 26 11 kg/m²        Intake/Output Summary (Last 24 hours) at 12/01/17 1416  Last data filed at 12/01/17 1324   Gross per 24 hour   Intake              660 ml   Output              375 ml   Net              285 ml       Invasive Devices     Peripheral Intravenous Line            Peripheral IV 11/29/17 Left Antecubital 2 days                Physical Exam:  Not in any acute respiratory distress  Lungs occasional bilateral expiratory rhonchi  Heart first and second heart sounds are heard no murmur or gallop is heard  Extremities no pedal edema  CNS awake and alert and oriented    Labs:   CBC:   Lab Results   Component Value Date    WBC 7 82 12/01/2017    HGB 11 8 (L) 12/01/2017    HCT 38 4 12/01/2017    MCV 73 (L) 12/01/2017     12/01/2017    MCH 22 5 (L) 12/01/2017    MCHC 30 7 (L) 12/01/2017    RDW 17 6 (H) 12/01/2017    MPV 9 7 12/01/2017    NRBC 0 12/01/2017     Imaging and other studies: I have personally reviewed pertinent films in PACS

## 2017-12-02 PROBLEM — N17.9 ACUTE KIDNEY INJURY (HCC): Status: RESOLVED | Noted: 2017-11-29 | Resolved: 2017-12-02

## 2017-12-02 LAB
ANION GAP SERPL CALCULATED.3IONS-SCNC: 10 MMOL/L (ref 4–13)
BASOPHILS # BLD AUTO: 0.04 THOUSANDS/ΜL (ref 0–0.1)
BASOPHILS NFR BLD AUTO: 1 % (ref 0–1)
BUN SERPL-MCNC: 16 MG/DL (ref 5–25)
CALCIUM SERPL-MCNC: 8.3 MG/DL (ref 8.3–10.1)
CHLORIDE SERPL-SCNC: 106 MMOL/L (ref 100–108)
CO2 SERPL-SCNC: 24 MMOL/L (ref 21–32)
CREAT SERPL-MCNC: 1.01 MG/DL (ref 0.6–1.3)
EOSINOPHIL # BLD AUTO: 0.11 THOUSAND/ΜL (ref 0–0.61)
EOSINOPHIL NFR BLD AUTO: 2 % (ref 0–6)
ERYTHROCYTE [DISTWIDTH] IN BLOOD BY AUTOMATED COUNT: 17.9 % (ref 11.6–15.1)
GFR SERPL CREATININE-BSD FRML MDRD: 69 ML/MIN/1.73SQ M
GLUCOSE SERPL-MCNC: 100 MG/DL (ref 65–140)
GLUCOSE SERPL-MCNC: 109 MG/DL (ref 65–140)
GLUCOSE SERPL-MCNC: 110 MG/DL (ref 65–140)
GLUCOSE SERPL-MCNC: 117 MG/DL (ref 65–140)
GLUCOSE SERPL-MCNC: 141 MG/DL (ref 65–140)
HCT VFR BLD AUTO: 39.5 % (ref 36.5–49.3)
HGB BLD-MCNC: 12.1 G/DL (ref 12–17)
LYMPHOCYTES # BLD AUTO: 0.98 THOUSANDS/ΜL (ref 0.6–4.47)
LYMPHOCYTES NFR BLD AUTO: 14 % (ref 14–44)
MCH RBC QN AUTO: 22.5 PG (ref 26.8–34.3)
MCHC RBC AUTO-ENTMCNC: 30.6 G/DL (ref 31.4–37.4)
MCV RBC AUTO: 74 FL (ref 82–98)
MONOCYTES # BLD AUTO: 0.45 THOUSAND/ΜL (ref 0.17–1.22)
MONOCYTES NFR BLD AUTO: 7 % (ref 4–12)
NEUTROPHILS # BLD AUTO: 5.36 THOUSANDS/ΜL (ref 1.85–7.62)
NEUTS SEG NFR BLD AUTO: 77 % (ref 43–75)
NRBC BLD AUTO-RTO: 0 /100 WBCS
PLATELET # BLD AUTO: 155 THOUSANDS/UL (ref 149–390)
PMV BLD AUTO: 9.3 FL (ref 8.9–12.7)
POTASSIUM SERPL-SCNC: 3.3 MMOL/L (ref 3.5–5.3)
RBC # BLD AUTO: 5.37 MILLION/UL (ref 3.88–5.62)
SODIUM SERPL-SCNC: 140 MMOL/L (ref 136–145)
WBC # BLD AUTO: 6.97 THOUSAND/UL (ref 4.31–10.16)

## 2017-12-02 PROCEDURE — 80048 BASIC METABOLIC PNL TOTAL CA: CPT | Performed by: INTERNAL MEDICINE

## 2017-12-02 PROCEDURE — 85025 COMPLETE CBC W/AUTO DIFF WBC: CPT | Performed by: INTERNAL MEDICINE

## 2017-12-02 PROCEDURE — 94760 N-INVAS EAR/PLS OXIMETRY 1: CPT

## 2017-12-02 PROCEDURE — 94668 MNPJ CHEST WALL SBSQ: CPT

## 2017-12-02 PROCEDURE — 82948 REAGENT STRIP/BLOOD GLUCOSE: CPT

## 2017-12-02 PROCEDURE — 94640 AIRWAY INHALATION TREATMENT: CPT

## 2017-12-02 RX ORDER — POTASSIUM CHLORIDE 20 MEQ/1
40 TABLET, EXTENDED RELEASE ORAL 2 TIMES DAILY WITH MEALS
Status: DISCONTINUED | OUTPATIENT
Start: 2017-12-02 | End: 2017-12-04 | Stop reason: HOSPADM

## 2017-12-02 RX ADMIN — Medication 250 MG: at 08:03

## 2017-12-02 RX ADMIN — DOXYCYCLINE 100 MG: 100 CAPSULE ORAL at 21:48

## 2017-12-02 RX ADMIN — Medication 250 MG: at 16:05

## 2017-12-02 RX ADMIN — METOPROLOL SUCCINATE 25 MG: 25 TABLET, FILM COATED, EXTENDED RELEASE ORAL at 08:03

## 2017-12-02 RX ADMIN — IPRATROPIUM BROMIDE 0.5 MG: 0.5 SOLUTION RESPIRATORY (INHALATION) at 15:13

## 2017-12-02 RX ADMIN — RIVAROXABAN 20 MG: 20 TABLET, FILM COATED ORAL at 18:00

## 2017-12-02 RX ADMIN — IPRATROPIUM BROMIDE 0.5 MG: 0.5 SOLUTION RESPIRATORY (INHALATION) at 19:56

## 2017-12-02 RX ADMIN — SENNOSIDES 8.6 MG: 8.6 TABLET, FILM COATED ORAL at 21:48

## 2017-12-02 RX ADMIN — METFORMIN HYDROCHLORIDE 500 MG: 500 TABLET, FILM COATED ORAL at 08:03

## 2017-12-02 RX ADMIN — METFORMIN HYDROCHLORIDE 500 MG: 500 TABLET, FILM COATED ORAL at 16:05

## 2017-12-02 RX ADMIN — IPRATROPIUM BROMIDE 0.5 MG: 0.5 SOLUTION RESPIRATORY (INHALATION) at 09:08

## 2017-12-02 RX ADMIN — GUAIFENESIN 600 MG: 600 TABLET, EXTENDED RELEASE ORAL at 21:48

## 2017-12-02 RX ADMIN — FUROSEMIDE 40 MG: 40 TABLET ORAL at 08:03

## 2017-12-02 RX ADMIN — LEVALBUTEROL HYDROCHLORIDE 1.25 MG: 1.25 SOLUTION, CONCENTRATE RESPIRATORY (INHALATION) at 15:13

## 2017-12-02 RX ADMIN — LEVALBUTEROL HYDROCHLORIDE 1.25 MG: 1.25 SOLUTION, CONCENTRATE RESPIRATORY (INHALATION) at 09:08

## 2017-12-02 RX ADMIN — PRAVASTATIN SODIUM 20 MG: 20 TABLET ORAL at 16:06

## 2017-12-02 RX ADMIN — FLUTICASONE PROPIONATE AND SALMETEROL 1 PUFF: 50; 250 POWDER RESPIRATORY (INHALATION) at 08:04

## 2017-12-02 RX ADMIN — LEVALBUTEROL HYDROCHLORIDE 1.25 MG: 1.25 SOLUTION, CONCENTRATE RESPIRATORY (INHALATION) at 19:56

## 2017-12-02 RX ADMIN — DOXYCYCLINE 100 MG: 100 CAPSULE ORAL at 08:03

## 2017-12-02 RX ADMIN — POTASSIUM CHLORIDE 40 MEQ: 1500 TABLET, EXTENDED RELEASE ORAL at 16:05

## 2017-12-02 RX ADMIN — TAMSULOSIN HYDROCHLORIDE 0.4 MG: 0.4 CAPSULE ORAL at 16:06

## 2017-12-02 RX ADMIN — GUAIFENESIN 600 MG: 600 TABLET, EXTENDED RELEASE ORAL at 08:03

## 2017-12-02 RX ADMIN — FLUTICASONE PROPIONATE AND SALMETEROL 1 PUFF: 50; 250 POWDER RESPIRATORY (INHALATION) at 21:48

## 2017-12-02 RX ADMIN — PANTOPRAZOLE SODIUM 20 MG: 20 TABLET, DELAYED RELEASE ORAL at 05:52

## 2017-12-02 RX ADMIN — POTASSIUM CHLORIDE 40 MEQ: 1500 TABLET, EXTENDED RELEASE ORAL at 08:02

## 2017-12-02 NOTE — PROGRESS NOTES
Pt out of bed for breakfast this morning  Ambulated to the bathroom  Continue to have dry nonproductive cough and unable to collect sputum at this time  Will continue to monitor

## 2017-12-02 NOTE — PROGRESS NOTES
Progress Note - Cardiology     Magdalena Hoover 81 y o  male MRN: [id number]  Unit/Bed#: -01 Encounter: [id number]      Subjective:   States he is feeling better  Shortness of breath has improved  Patient denies chest pain, palpitations, lightheadedness  Objective:   Vitals:  Vitals:    12/02/17 0912   BP:    Pulse:    Resp:    Temp:    SpO2: 97%       Body mass index is 26 47 kg/m²  Systolic (16HGQ), THY:502 , Min:126 , KCA:713     Diastolic (94AVY), ZNI:71, Min:64, Max:70      Intake/Output Summary (Last 24 hours) at 12/02/17 1102  Last data filed at 12/02/17 0750   Gross per 24 hour   Intake              420 ml   Output              525 ml   Net             -105 ml     Weight (last 2 days)     Date/Time   Weight    12/02/17 0600  81 3 (179 23)    12/01/17 0600  80 2 (176 81)    11/30/17 0300  79 7 (175 71)              PHYSICAL EXAM:  General: Patient is not in acute distress  Laying comfortably in the bed  Awake, alert, responding to commands  Head: Normocephalic  Atraumatic  HEENT: Nonicteric  Neck: JVP not raised  Trachea central  No thyromegaly  Respiratory: Bilateral bronchovascular breath sounds with no crackles or rhonchi  Cardiovascular: RRR  S1 and S2 normal  No murmur, rub or gallop  GI: Abdomen soft, nontender  No guarding or rigidity  Bowel sounds present  Neurologic: Patient is awake, alert, responding to command  Moving all extremities  Integumentary: Right lower extremity venous insufficiency changes without pitting edema   Lymphatic: No cervical lymphadenopathy  Extremities: Right lower extremity venous insufficiency changes without pitting edema   Unna boot on LLE      LABORATORY RESULTS:    Results from last 7 days  Lab Units 12/01/17  0450 11/30/17  0437 11/30/17  0008 11/29/17  2024 11/29/17  1734 11/29/17  1409   CK TOTAL U/L 536* 1,084*  --   --   --  2,573*   TROPONIN I ng/mL  --   --  2 37* 3 02* 3 58* 3 62*   CK MB INDEX % <1 0 <1 0  --   --   --  <1 0     CBC with diff: Results from last 7 days  Lab Units 12/02/17  0511 12/01/17 0450 11/30/17 0437 11/29/17  1409   WBC Thousand/uL 6 97 7 82 10 85*  --  12 93*   HEMOGLOBIN g/dL 12 1 11 8* 12 4  --  14 2   HEMATOCRIT % 39 5 38 4 39 9  --  45 8   MCV fL 74* 73* 74*  --  74*   PLATELETS Thousands/uL 155 153 150  < > 186   MCH pg 22 5* 22 5* 22 9*  --  22 9*   MCHC g/dL 30 6* 30 7* 31 1*  --  31 0*   RDW % 17 9* 17 6* 18 0*  --  18 6*   MPV fL 9 3 9 7 8 9  < > 9 5   NRBC AUTO /100 WBCs 0 0  --   --  0   < > = values in this interval not displayed      CMP:  Results from last 7 days  Lab Units 12/02/17 0511 12/01/17 0450 11/30/17 0437 11/29/17  1409   SODIUM mmol/L 140 139 138 140   POTASSIUM mmol/L 3 3* 3 5 3 3* 4 0   CHLORIDE mmol/L 106 107 105 103   CO2 mmol/L 24 21 24 25   ANION GAP mmol/L 10 11 9 12   BUN mg/dL 16 20 26* 24   CREATININE mg/dL 1 01 1 07 1 34* 1 62*   GLUCOSE RANDOM mg/dL 117 118 90 93   CALCIUM mg/dL 8 3 8 0* 8 1* 9 1   AST U/L  --   --   --  82*   ALT U/L  --   --   --  24   ALK PHOS U/L  --   --   --  73   TOTAL PROTEIN g/dL  --   --   --  7 1   ALBUMIN g/dL  --   --   --  2 6*   BILIRUBIN TOTAL mg/dL  --   --   --  2 50*   EGFR ml/min/1 73sq m 69 65 49 39       BMP:  Results from last 7 days  Lab Units 12/02/17 0511 12/01/17 0450 11/30/17 0437   SODIUM mmol/L 140 139 138   POTASSIUM mmol/L 3 3* 3 5 3 3*   CHLORIDE mmol/L 106 107 105   CO2 mmol/L 24 21 24   BUN mg/dL 16 20 26*   CREATININE mg/dL 1 01 1 07 1 34*   GLUCOSE RANDOM mg/dL 117 118 90   CALCIUM mg/dL 8 3 8 0* 8 1*                Results from last 7 days  Lab Units 12/01/17  0450 11/29/17  1409   MAGNESIUM mg/dL 1 7 1 7         Results from last 7 days  Lab Units 11/30/17  0437   HEMOGLOBIN A1C % 5 9         Results from last 7 days  Lab Units 11/29/17  1526 11/29/17  1409   TSH 3RD GENERATON uIU/mL  --  6 748*   FREE T4 ng/dL 1 02  --          Results from last 7 days  Lab Units 11/29/17  1409   INR  1 80*       Lipid Profile:   Lab Results Component Value Date    CHOL 84 2017    CHOL 115 2017    CHOL 105 2016     Lab Results   Component Value Date    HDL 35 (L) 2017    HDL 55 2017    HDL 46 2016     Lab Results   Component Value Date    LDLCALC 37 2017    LDLCALC 50 2017    LDLCALC 52 2016     Lab Results   Component Value Date    TRIG 60 2017    TRIG 51 2017    TRIG 35 2016       Cardiac testing:   Results for orders placed during the hospital encounter of 17   Echo complete with contrast if indicated    Narrative [address] [place] [address]  [phone]    Transthoracic Echocardiogram  2D, M-mode, Doppler, and Color Doppler    Study date:  2017    Patient: Faisal Benítez  MR number: [id number]  Account number: [de-identified]  : 1935  Age: 81 years  Gender: Male  Status: Inpatient  Location: Bedside  Height: 69 in  Weight: 175 lb  BP: 158/ 77 mmHg    Indications: Heart Failure    Diagnoses: I50 9 - Heart failure, unspecified    Sonographer:  HILDA Negrete,RDCS  Interpreting Physician:  Steffi Allison MD  Referring Physician:  Iris Montalvo MD  Group:  [facility]    SUMMARY    LEFT VENTRICLE:  Ejection fraction was estimated to be 50 %  This study was inadequate for the evaluation of regional wall motion  RIGHT VENTRICLE:  The ventricle was dilated  Systolic function was mildly reduced  LEFT ATRIUM:  The atrium was moderately dilated  RIGHT ATRIUM:  The atrium was dilated  MITRAL VALVE:  There was trace to mild regurgitation  TRICUSPID VALVE:  There was trace regurgitation      HISTORY: PRIOR HISTORY: Atrial Fibrillation, Congestive Heart Failure, NSTEMI, Hypokalemia, Type 2 Diabetes Mellitus, Chronic Kidney Disease, Acute Kidney Injury, Shortness of Breath, Cough, Chronic Obstructive Pulmonary Disease    PROCEDURE: The procedure was performed at the bedside  This was a routine study  The transthoracic approach was used  The study included complete 2D imaging, M-mode, complete spectral Doppler, and color Doppler  The heart rate was 90 bpm,  at the start of the study  Images were obtained from the parasternal, apical, subcostal, and suprasternal notch acoustic windows  Echocardiographic views were limited due to poor acoustic window availability, decreased penetration, and  lung interference  This was a technically difficult study  LEFT VENTRICLE: Size was normal  Ejection fraction was estimated to be 50 %  This study was inadequate for the evaluation of regional wall motion  RIGHT VENTRICLE: The ventricle was dilated  Systolic function was mildly reduced  Wall thickness was normal     LEFT ATRIUM: The atrium was moderately dilated  RIGHT ATRIUM: The atrium was dilated  MITRAL VALVE: There was annular calcification  DOPPLER: There was trace to mild regurgitation  AORTIC VALVE: The valve was not well visualized  DOPPLER: There was no evidence for stenosis  TRICUSPID VALVE: The valve structure was normal  There was normal leaflet separation  DOPPLER: The transtricuspid velocity was within the normal range  There was no evidence for stenosis  There was trace regurgitation  PULMONIC VALVE: Not well visualized  PERICARDIUM: There was no pericardial effusion  The pericardium was normal in appearance  AORTA: The root exhibited normal size      SYSTEM MEASUREMENT TABLES    2D  %FS: 18 8 %  Ao Diam: 3 1 cm  EDV(Teich): 101 9 ml  EF(Teich): 38 8 %  ESV(Teich): 62 4 ml  IVSd: 1 5 cm  LA Area: 25 2 cm2  LA Diam: 5 2 cm  LVEDV MOD A4C: 74 9 ml  LVEF MOD A4C: 55 4 %  LVESV MOD A4C: 33 4 ml  LVIDd: 4 7 cm  LVIDs: 3 8 cm  LVLd A4C: 8 2 cm  LVLs A4C: 6 9 cm  LVPWd: 1 5 cm  RA Area: 25 cm2  RVIDd: 4 cm  SV MOD A4C: 41 5 ml  SV(Teich): 39 6 ml    CW  TR Vmax: 1 8 m/s  TR maxP 3 mmHg    MM  TAPSE: 1 7 cm    [facility] Accredited Echocardiography Laboratory    Prepared and electronically signed by    Sundeep Irizarry MD  Signed [id number] 13:20:25         Imaging: I have personally reviewed pertinent reports  Procedure: Xr Chest Pa & Lateral    Result Date: 11/29/2017  Narrative: CHEST INDICATION:  Patient fell  Cough  COMPARISON:  None VIEWS:  Frontal and lateral projections IMAGES:  2 FINDINGS:  Lungs are adequately aerated  Small effusions on lateral view larger on the left and right  Ovoid 3 6 cm density in the right upper lobe of uncertain etiology, may be infectious or neoplastic  Follow-up imaging recommended after treatment  Right basilar infiltrate or atelectasis  Cardiac silhouette is enlarged  Mild vascular congestion  Prominent sean on lateral view likely dilated pulmonary vessels  No significant peribronchial thickening  Atherosclerotic aorta  Bony structures are diffusely demineralized and degenerative  No displaced rib fractures are seen  No pneumothorax  Impression: Ovoid 3 6 cm density in the right upper lobe may be infectious or neoplastic  Infiltrate or atelectasis in the right lung base  Small pleural effusions larger on the left than right  Cardiomegaly with mild vascular congestion but without significant interstitial edema  *Follow-up x-ray recommended after treatment (computed tomography recommended if right upper lobe density persists)  Workstation performed: [id number]     Procedure: Ct Head Without Contrast    Result Date: 11/29/2017  Narrative: CT BRAIN - WITHOUT CONTRAST INDICATION:  Fall  COMPARISON:  None  TECHNIQUE:  CT examination of the brain was performed  In addition to axial images, coronal reformatted images were created and submitted for interpretation  Radiation dose length product (DLP) for this visit:  949 mGy-cm     This examination, like all CT scans performed in the [facility], was performed utilizing techniques to minimize radiation dose exposure, including the use of iterative reconstruction and automated exposure control  IMAGE QUALITY:  Diagnostic  FINDINGS:  PARENCHYMA:  Decreased attenuation is noted in the supratentorial white matter demonstrating an appearance most consistent with moderate microangiopathic change  No intracranial mass, mass effect or midline shift  No CT signs of acute infarction  There is no parenchymal hemorrhage  VENTRICLES AND EXTRA-AXIAL SPACES:  Enlargement of ventricles and extra-axial CSF spaces consistent with cerebral and cerebellar atrophy  VISUALIZED ORBITS AND PARANASAL SINUSES:  Unremarkable  CALVARIUM AND EXTRACRANIAL SOFT TISSUES:   Normal      Impression: No acute intracranial abnormality  Workstation performed: [id number]     Procedure: Ct Chest Wo Contrast    Result Date: 11/30/2017  Narrative: CT CHEST WITHOUT IV CONTRAST INDICATION:  Follow-up pulmonary nodule  COMPARISON: Chest radiographs November 29, 2017 TECHNIQUE: CT examination of the chest was performed without intravenous contrast   Reformatted images were created in axial, sagittal, and coronal planes  Radiation dose length product (DLP) for this visit:  250 mGy-cm   This examination, like all CT scans performed in the [facility], was performed utilizing techniques to minimize radiation dose exposure, including the use of iterative reconstruction and automated exposure control  FINDINGS: LUNGS:  The lungs are well aerated  Large area of groundglass infiltrate is present in the posterior aspect of the right upper lobe  1 7 x 2 3 x 2 1 cm mildly spiculated solid soft tissue mass in the right upper lobe (series 3, image 20 and series 601, image 83)  1 4 x 1 5 x 2 4 cm mildly spiculated soft tissue mass in the posterior aspect of the right upper lobe (series 3, image 20 and series 601, image 90)  These correspond to the abnormality identified on the recent chest radiograph  Scattered groundglass infiltrates in the right middle lobe  Mildly spiculated soft tissue mass in the posterior medial aspect of the right middle lobe measuring 1 7 x 2 2 x 2 3 cm (series 3, image 44 and series 601, image 70)  Calcified granuloma right lower lobe measuring 2 2 cm (series 3, image 44)  0 9 x 0 6 x 0 9 cm mildly spiculated soft tissue mass in the anteromedial aspect of the right lower lobe (series 3, image 44 and series 601, image 92)  Emphysematous changes in the left lung with bullous disease (series 3, image 20)  Vague groundglass infiltrate in the left lower lobe  PLEURA:  Small bilateral pleural effusions, right side greater than left  Compressive atelectasis in the lower lobes bilaterally  HEART/GREAT VESSELS:  The heart is enlarged  Coronary artery calcifications  No evidence of a pericardial effusion  MEDIASTINUM AND ENID:  Prominent mediastinal lymph nodes  Hilar adenopathy unable to be assessed due to the lack of intravenous contrast material  CHEST WALL AND LOWER NECK:       Normal  VISUALIZED STRUCTURES IN THE UPPER ABDOMEN:  Stomach distended and filled with recently ingested food products  The gallbladder is contracted  There are layering calculi in the gallbladder neck  No pericholecystic inflammatory changes  Pancreas appears atrophic  OSSEOUS STRUCTURES: Osseous structures are demineralized  No acute fracture  No destructive osseous lesion  Old healed fractures involving the anterior aspects of the left 4th through 9th ribs  Old healed fractures involving the anterolateral aspects of the left 2nd and 4th through 7th ribs as well as the posterior aspect of the right 10th rib  Old wedge compression fracture involving the superior endplate of the T5 vertebral body  Degenerative changes throughout the cervical, thoracic and upper lumbar spine  Endplate irregularity involving the inferior endplate L2, likely chronic degenerative  Impression: 1  Groundglass infiltrates within the lungs bilaterally, right side greater than left  Findings most compatible with multifocal/multi lobar pneumonia  2   Multiple right-sided pulmonary nodules  Although the findings may be infectious, pulmonary neoplasm, either primary or metastatic, not excluded  Recommend adequate treatment for pneumonia with follow-up CT scan in one month  Also a consideration would be diagnostic right-sided thoracentesis with evaluation of the pleural fluid for cell count and cytology  CT-guided biopsy would be difficult, given their location  Bronchoscopy with bronchial washings from the right upper lobe is also a consideration  3   Cholelithiasis without CT evidence of acute cholecystitis  I personally discussed this study with Brandi Thompson on 11/30/2017 11:52 AM  Workstation performed: [id number]     Procedure: Ct Cervical Spine Without Contrast    Result Date: 11/29/2017  Narrative: CT CERVICAL SPINE - WITHOUT CONTRAST INDICATION: Fall  COMPARISON: None  TECHNIQUE:  CT examination of the cervical spine was performed without intravenous contrast   Contiguous axial images were obtained  Sagittal and coronal reconstructions were performed  Radiation dose length product (DLP) for this visit:  481 mGy-cm   This examination, like all CT scans performed in the [facility], was performed utilizing techniques to minimize radiation dose exposure, including the use of iterative reconstruction and automated exposure control  IMAGE QUALITY:  Diagnostic  FINDINGS: ALIGNMENT:  Normal alignment of the cervical spine  No subluxation  VERTEBRAL BODIES:  No fracture  DEGENERATIVE CHANGES:  Mild multilevel cervical degenerative changes are noted without critical central canal stenosis  PREVERTEBRAL AND PARASPINAL SOFT TISSUES: Normal         THORACIC INLET:  Emphysema is noted at the lung apices  Impression: No cervical spine fracture or traumatic malalignment       Workstation performed: [id number]       Meds/Allergies   current meds:   Current Facility-Administered Medications   Medication Dose Route Frequency    acetaminophen (TYLENOL) tablet 650 mg  650 mg Oral Q6H PRN    doxycycline hyclate (VIBRAMYCIN) capsule 100 mg  100 mg Oral Q12H [address]    fluticasone-salmeterol (ADVAIR) 250-50 mcg/dose inhaler 1 puff  1 puff Inhalation Q12H [address]    furosemide (LASIX) tablet 40 mg  40 mg Oral Daily    guaiFENesin (MUCINEX) 12 hr tablet 600 mg  600 mg Oral Q12H [address]    insulin lispro (HumaLOG) 100 units/mL subcutaneous injection 1-5 Units  1-5 Units Subcutaneous TID AC    insulin lispro (HumaLOG) 100 units/mL subcutaneous injection 1-5 Units  1-5 Units Subcutaneous HS    ipratropium (ATROVENT) 0 02 % inhalation solution 0 5 mg  0 5 mg Nebulization TID    And    levalbuterol (XOPENEX) inhalation solution 1 25 mg  1 25 mg Nebulization TID    ipratropium-albuterol (DUO-NEB) 0 5-2 5 mg/mL inhalation solution 3 mL  3 mL Nebulization Q4H PRN    magnesium gluconate (MAGONATE) tablet 250 mg  250 mg Oral BID AC    metFORMIN (GLUCOPHAGE) tablet 500 mg  500 mg Oral BID With Meals    metoprolol succinate (TOPROL-XL) 24 hr tablet 25 mg  25 mg Oral Daily    pantoprazole (PROTONIX) EC tablet 20 mg  20 mg Oral Early Morning    potassium chloride (K-DUR,KLOR-CON) CR tablet 40 mEq  40 mEq Oral BID With Meals    pravastatin (PRAVACHOL) tablet 20 mg  20 mg Oral Daily With Dinner    rivaroxaban (XARELTO) tablet 15 mg  15 mg Oral Daily With Breakfast    senna (SENOKOT) tablet 8 6 mg  1 tablet Oral HS    tamsulosin (FLOMAX) capsule 0 4 mg  0 4 mg Oral Daily With Dinner     Prescriptions Prior to Admission   Medication    alendronate (FOSAMAX) 70 mg tablet    fluticasone-salmeterol (ADVAIR DISKUS) 250-50 mcg/dose inhaler    furosemide (LASIX) 40 mg tablet    Lancets (FREESTYLE) lancets    magnesium chloride-calcium (SLOW-MAG) 71 5-119 mg    metFORMIN (GLUCOPHAGE-XR) 500 mg 24 hr tablet    metoprolol succinate (TOPROL-XL) 25 mg 24 hr tablet    potassium chloride (KLOR-CON M20) 20 mEq tablet    rivaroxaban (XARELTO) 15 mg tablet    simvastatin (ZOCOR) 20 mg tablet    tamsulosin (FLOMAX) 0 4 mg    Omeprazole 20 MG TBEC            Assessment and Plan:     1   NSTEMI, likely type 2 due to fall and rhabdomyolysis but primary cardiac cause cannot be ruled out  Heparin drip was held off due to the clinical picture being more convincing for rhabdomyolysis + patient would have been at high bleeding risk due to being on Xarelto + pt had initial poor renal function (since recovered)  ECHO with EF 50%       2   AFib/flutter: mostly rate controlled with occasional wide complex beats (PVCs vs aberrantly conducted beats)   Rate controlled on Toprol   Continue Xarelto for anticoagulation - dose adjusted for improved renal function      3   Chronic systolic heart failure:  Continue Lasix, Toprol  Will consider adding Ace inhibitor or ARB once renal function stabilizes      4   CAD S/P stents x2: Primitivo Suarez  Continue statin, beta-blocker   No aspirin due to high bleeding risk along with Xarelto     5   Hypertension:  Stable  Continue all antihypertensive medications      6   Hyperlipidemia:  Continue statin       ** Please Note: Dictation voice to text software may have been used in the creation of this document   **

## 2017-12-02 NOTE — PLAN OF CARE
Problem: Potential for Falls  Goal: Patient will remain free of falls  INTERVENTIONS:  - Assess patient frequently for physical needs  -  Identify cognitive and physical deficits and behaviors that affect risk of falls    -  [place] fall precautions as indicated by assessment   - Educate patient/family on patient safety including physical limitations  - Instruct patient to call for assistance with activity based on assessment  - Modify environment to reduce risk of injury  - Consider OT/PT consult to assist with strengthening/mobility   Outcome: Progressing      Problem: Prexisting or High Potential for Compromised Skin Integrity  Goal: Skin integrity is maintained or improved  INTERVENTIONS:  - Identify patients at risk for skin breakdown  - Assess and monitor skin integrity  - Assess and monitor nutrition and hydration status  - Monitor labs (i e  albumin)  - Assess for incontinence   - Turn and reposition patient  - Assist with mobility/ambulation  - Relieve pressure over bony prominences  - Avoid friction and shearing  - Provide appropriate hygiene as needed including keeping skin clean and dry  - Evaluate need for skin moisturizer/barrier cream  - Collaborate with interdisciplinary team (i e  Nutrition, Rehabilitation, etc )   - Patient/family teaching   Outcome: Progressing      Problem: DISCHARGE PLANNING  Goal: Discharge to home or other facility with appropriate resources  INTERVENTIONS:  - Identify barriers to discharge w/patient and caregiver  - Arrange for needed discharge resources and transportation as appropriate  - Identify discharge learning needs (meds, wound care, etc )  - Arrange for interpretive services to assist at discharge as needed  - Refer to Case Management Department for coordinating discharge planning if the patient needs post-hospital services based on physician/advanced practitioner order or complex needs related to functional status, cognitive ability, or social support system Outcome: Progressing      Problem: Knowledge Deficit  Goal: Patient/family/caregiver demonstrates understanding of disease process, treatment plan, medications, and discharge instructions  Complete learning assessment and assess knowledge base    Interventions:  - Provide teaching at level of understanding  - Provide teaching via preferred learning methods   Outcome: Progressing      Problem: SKIN/TISSUE INTEGRITY - ADULT  Goal: Skin integrity remains intact  INTERVENTIONS  - Identify patients at risk for skin breakdown  - Assess and monitor skin integrity  - Assess and monitor nutrition and hydration status  - Monitor labs (i e  albumin)  - Assess for incontinence   - Turn and reposition patient  - Assist with mobility/ambulation  - Relieve pressure over bony prominences  - Avoid friction and shearing  - Provide appropriate hygiene as needed including keeping skin clean and dry  - Evaluate need for skin moisturizer/barrier cream  - Collaborate with interdisciplinary team (i e  Nutrition, Rehabilitation, etc )   - Patient/family teaching   Outcome: Progressing      Problem: MUSCULOSKELETAL - ADULT  Goal: Maintain or return mobility to safest level of function  INTERVENTIONS:  - Assess patient's ability to carry out ADLs; assess patient's baseline for ADL function and identify physical deficits which impact ability to perform ADLs (bathing, care of mouth/teeth, toileting, grooming, dressing, etc )  - Assess/evaluate cause of self-care deficits   - Assess range of motion  - Assess patient's mobility; develop plan if impaired  - Assess patient's need for assistive devices and provide as appropriate  - Encourage maximum independence but intervene and supervise when necessary  - Involve family in performance of ADLs  - Assess for home care needs following discharge   - Request OT consult to assist with ADL evaluation and planning for discharge  - Provide patient education as appropriate   Outcome: Progressing      Problem: DISCHARGE PLANNING - CARE MANAGEMENT  Goal: Discharge to post-acute care or home with appropriate resources  INTERVENTIONS:  - Conduct assessment to determine patient/family and health care team treatment goals, and need for post-acute services based on payer coverage, community resources, and patient preferences, and barriers to discharge  - Address psychosocial, clinical, and financial barriers to discharge as identified in assessment in conjunction with the patient/family and health care team  - Arrange appropriate level of post-acute services according to patients   needs and preference and payer coverage in collaboration with the physician and health care team  - Communicate with and update the patient/family, physician, and health care team regarding progress on the discharge plan  - Arrange appropriate transportation to post-acute venues   Outcome: Progressing

## 2017-12-02 NOTE — PROGRESS NOTES
Progress Note - Christos Salamanca 81 y o  male MRN: [id number]  Unit/Bed#: -01 Encounter: [id number]    Subjective:   Yadi continues to feel better  Cough is improving, he denies any shortness of breath  He denies any chest pain  He has not had fever or chills  His strength is improving  His appetite is good  He is ambulatory within his room  He is agreeable to short-term rehabilitation  He denies headache or dizziness  He denies any focal numbness, tingling or weakness  All other ROS are negative  Objective:   Vitals: Blood pressure 147/70, pulse 72, temperature 97 6 °F (36 4 °C), temperature source Oral, resp  rate 19, height 5' 9" (1 753 m), weight 81 3 kg (179 lb 3 7 oz), SpO2 94 %  ,Body mass index is 26 47 kg/m²  SPO2 RA Rest    Flowsheet Row ED to Hosp-Admission (Current) from 11/29/2017 in [address] [place] [address] 2nd Floor Med Surg Unit   SpO2  94 %   SpO2 Activity  At Rest   O2 Device  None (Room air)   O2 Flow Rate  No data        I&O:   Intake/Output Summary (Last 24 hours) at 12/02/17 0810  Last data filed at 12/02/17 0750   Gross per 24 hour   Intake              420 ml   Output              525 ml   Net             -105 ml         Physical Exam:       General Appearance:    Alert, cooperative, no distress   Head:    Normocephalic, without obvious abnormality, atraumatic   Eyes:    PERRL, conjunctiva/corneas clear, EOM's intact       Nose:   Moist mucous membranes, no drainage or sinus tenderness   Throat:   No tenderness, no exudates   Neck:   Supple, symmetrical, trachea midline, no JVD   Lungs:     Faint bibasilar or rhonchi, markedly improved, respirations unlabored   Heart:    Regular rate and rhythm, S1 and S2 normal, 1/6 systolic murmur, no rub   or gallop   Abdomen: Soft, non-tender, positive bowel sounds, no masses, no organomegaly   Extremities:  Right lower extremity venous insufficiency changes without edema    Left lower extremity Unna boot, mycotic thickened toenails Neurologic:     CNII-XII intact  Invasive Devices     Peripheral Intravenous Line            Peripheral IV 11/29/17 Left Antecubital 2 days                      Social History  reviewed  History reviewed  No pertinent family history   reviewed    Meds:  Current Facility-Administered Medications   Medication Dose Route Frequency Provider Last Rate Last Dose    acetaminophen (TYLENOL) tablet 650 mg  650 mg Oral Q6H PRN BHANU Waters        doxycycline hyclate (VIBRAMYCIN) capsule 100 mg  100 mg Oral Q12H [address] María Campbell MD   100 mg at 12/02/17 0803    fluticasone-salmeterol (ADVAIR) 250-50 mcg/dose inhaler 1 puff  1 puff Inhalation Q12H [address] Amber Alejos Olszewski, CRNP   1 puff at 12/02/17 0804    furosemide (LASIX) tablet 40 mg  40 mg Oral Daily BHANU Waters   40 mg at 12/02/17 0803    guaiFENesin (MUCINEX) 12 hr tablet 600 mg  600 mg Oral Q12H [address] Huber Adams PA-C   600 mg at 12/02/17 0803    insulin lispro (HumaLOG) 100 units/mL subcutaneous injection 1-5 Units  1-5 Units Subcutaneous TID AC Amber Alejos Olszewski, CRNP   1 Units at 12/01/17 1230    insulin lispro (HumaLOG) 100 units/mL subcutaneous injection 1-5 Units  1-5 Units Subcutaneous HS Amber Alejos Olszewski, CRNP   1 Units at 11/29/17 2207    ipratropium (ATROVENT) 0 02 % inhalation solution 0 5 mg  0 5 mg Nebulization TID Huber Adams PA-C   0 5 mg at 12/01/17 2017    And    levalbuterol (Champ Donning) inhalation solution 1 25 mg  1 25 mg Nebulization TID Huber Adams PA-C   1 25 mg at 12/01/17 2017    ipratropium-albuterol (DUO-NEB) 0 5-2 5 mg/mL inhalation solution 3 mL  3 mL Nebulization Q4H PRN María Campbell MD        magnesium gluconate (MAGONATE) tablet 250 mg  250 mg Oral BID AC BHANU Waters   250 mg at 12/02/17 3291    metFORMIN (GLUCOPHAGE) tablet 500 mg  500 mg Oral BID With Meals María Campbell MD   500 mg at 12/02/17 0803    metoprolol succinate (TOPROL-XL) 24 hr tablet 25 mg  25 mg Oral Daily Misty M BHANU Vega   25 mg at 12/02/17 0803    pantoprazole (PROTONIX) EC tablet 20 mg  20 mg Oral Early Morning Misty Jetoumar Rodriguezo, CRNP   20 mg at 12/02/17 3995    potassium chloride (K-DUR,KLOR-CON) CR tablet 40 mEq  40 mEq Oral BID With Meals Jad Padilla MD        pravastatin (PRAVACHOL) tablet 20 mg  20 mg Oral Daily With [employer], CRNP   20 mg at 12/01/17 1745    rivaroxaban (XARELTO) tablet 15 mg  15 mg Oral Daily With Breakfast [employer] Jetoumar Rigo, CRNP   15 mg at 12/01/17 1745    senna (SENOKOT) tablet 8 6 mg  1 tablet Oral HS Misty M BHANU Vega   8 6 mg at 12/01/17 2122    tamsulosin (FLOMAX) capsule 0 4 mg  0 4 mg Oral Daily With [place] [employer] M BHANU Vega   0 4 mg at 12/01/17 1744      Prescriptions Prior to Admission   Medication    alendronate (FOSAMAX) 70 mg tablet    fluticasone-salmeterol (ADVAIR DISKUS) 250-50 mcg/dose inhaler    furosemide (LASIX) 40 mg tablet    Lancets (FREESTYLE) lancets    magnesium chloride-calcium (SLOW-MAG) 71 5-119 mg    metFORMIN (GLUCOPHAGE-XR) 500 mg 24 hr tablet    metoprolol succinate (TOPROL-XL) 25 mg 24 hr tablet    potassium chloride (KLOR-CON M20) 20 mEq tablet    rivaroxaban (XARELTO) 15 mg tablet    simvastatin (ZOCOR) 20 mg tablet    tamsulosin (FLOMAX) 0 4 mg    Omeprazole 20 MG TBEC       Labs:    Results from last 7 days  Lab Units 12/02/17  0511 12/01/17  0450 11/30/17  0437  11/29/17  1409   WBC Thousand/uL 6 97 7 82 10 85*  --  12 93*   HEMOGLOBIN g/dL 12 1 11 8* 12 4  --  14 2   HEMATOCRIT % 39 5 38 4 39 9  --  45 8   PLATELETS Thousands/uL 155 153 150  < > 186   NEUTROS PCT % 77* 80*  --   --  87*   LYMPHS PCT % 14 12*  --   --  8*   MONOS PCT % 7 6  --   --  4   EOS PCT % 2 1  --   --  0   < > = values in this interval not displayed      Results from last 7 days  Lab Units 12/02/17  0511 12/01/17  0450 11/30/17  0437 11/29/17  1409   SODIUM mmol/L 140 139 138 140   POTASSIUM mmol/L 3 3* 3 5 3 3* 4 0   CHLORIDE mmol/L 106 107 105 103   CO2 mmol/L 24 21 24 25   BUN mg/dL 16 20 26* 24   CREATININE mg/dL 1 01 1 07 1 34* 1 62*   CALCIUM mg/dL 8 3 8 0* 8 1* 9 1   TOTAL PROTEIN g/dL  --   --   --  7 1   BILIRUBIN TOTAL mg/dL  --   --   --  2 50*   ALK PHOS U/L  --   --   --  73   ALT U/L  --   --   --  24   AST U/L  --   --   --  82*   GLUCOSE RANDOM mg/dL 117 118 90 93     Lab Results   Component Value Date    TROPONINI 2 37 (H) 11/30/2017    TROPONINI 3 02 (H) 11/29/2017    TROPONINI 3 58 (H) 11/29/2017    CKTOTAL 536 (H) 12/01/2017    CKTOTAL 1,084 (H) 11/30/2017    CKTOTAL 2,573 (H) 11/29/2017       Results from last 7 days  Lab Units 11/29/17  1409   INR  1 80*     No results found for: [place], SPUTUMCULTUR    Imaging:  No results found for this or any previous visit  Results for orders placed during the hospital encounter of 11/29/17   XR chest pa & lateral    Narrative CHEST     INDICATION:  Patient fell  Cough  COMPARISON:  None    VIEWS:  Frontal and lateral projections    IMAGES:  2    FINDINGS:  Lungs are adequately aerated  Small effusions on lateral view larger on the left and right  Ovoid 3 6 cm density in the right upper lobe of uncertain etiology, may be infectious or neoplastic  Follow-up imaging recommended after treatment  Right basilar infiltrate or atelectasis  Cardiac silhouette is enlarged  Mild vascular congestion  Prominent sean on lateral view likely dilated pulmonary vessels  No significant peribronchial thickening  Atherosclerotic aorta  Bony structures are diffusely demineralized and degenerative  No displaced rib fractures are seen  No pneumothorax  Impression Ovoid 3 6 cm density in the right upper lobe may be infectious or neoplastic  Infiltrate or atelectasis in the right lung base  Small pleural effusions larger on the left than right  Cardiomegaly with mild vascular congestion but without significant interstitial edema      *Follow-up x-ray recommended after treatment (computed tomography recommended if right upper lobe density persists)  Workstation performed: [id number]         VTE Pharmacologic Prophylaxis: Xarelto      Code Status:   Level 1 - Full Code    Assessment:  Principal Problem:    Rhabdomyolysis  Active Problems:    Type 2 diabetes mellitus (HCC)    GERD (gastroesophageal reflux disease)    BPH (benign prostatic hyperplasia)    Atrial fibrillation (HCC)    Venous stasis dermatitis of left lower extremity    Congestive cardiac failure (HCC)    CKD (chronic kidney disease)    Acute kidney injury ([facility] [address] )    Osteoporosis    Fall    Shortness of breath    Cough    NSTEMI (non-ST elevated myocardial infarction) ([facility])    COPD (chronic obstructive pulmonary disease) ([facility])    Hypokalemia    Abnormal chest x-ray    Pulmonary nodules    Pneumonia  Resolved Problems:    * No resolved hospital problems  *      Plan:  · Rhabdomyolysis-improved  · Multi lobar pneumonia with right-sided nodularity-appreciate Pulmonary and Infectious Disease input  Plan is to complete 1 week course of oral doxycycline  Follow-up CT scan in 4-6 weeks as an outpatient  Consider further workup if nodules have increased in size    · Type 2 diabetes mellitus is stable on metformin and sliding scale  · Atrial fibrillation-heart rate controlled, anticoagulated with Xarelto  · Kidney function is stable, acute kidney injury resolved  · Hypokalemia-increase oral potassium to b i d   · CHF-borderline low ejection fraction, stable on oral Lasix  · COPD with exacerbation-improving with nebulizers  · Recent falls, debility-anticipate discharge to skilled nursing facility for short-term rehab on December 4th      Tatyana Gonzalez MD  12/2/2017,8:10 AM

## 2017-12-02 NOTE — RESPIRATORY THERAPY NOTE
Pt is an 81 yr old male with possible CAP and COPD without exacerbation  Pt was resting comfortably in the chair and easily aroused  Pt is on RA with no resp distress noted upon assessment  Treatment was given and pt participated with flutter  Pt will remain on current treatment plan with no need for changes at this time

## 2017-12-02 NOTE — RESPIRATORY THERAPY NOTE
Patient found on room air  Patient is LOS # 3 s/p being admitted for Rhabdomyolysis complicated by congestive cardiac failure CKD and COPD ( Worsening due to pneumonia )  Patient found sleeping without apparent respiratory distress states that he didn't sleep well last night  B s  With scattered coarseness patient has good congested NPC  Patient tolerating flutter therapy well with good technique  Plan: continue current treatment frequency as ordered

## 2017-12-02 NOTE — RESPIRATORY THERAPY NOTE
Patient remains on room air b s  Now clear and decreased  Patient tolerating flutter valve therapy well fair congested cough earlier today  Plan: continue current treatment frequency as ordered

## 2017-12-03 LAB
ANION GAP SERPL CALCULATED.3IONS-SCNC: 5 MMOL/L (ref 4–13)
BUN SERPL-MCNC: 14 MG/DL (ref 5–25)
CALCIUM SERPL-MCNC: 8.9 MG/DL (ref 8.3–10.1)
CHLORIDE SERPL-SCNC: 105 MMOL/L (ref 100–108)
CO2 SERPL-SCNC: 28 MMOL/L (ref 21–32)
CREAT SERPL-MCNC: 1.03 MG/DL (ref 0.6–1.3)
GFR SERPL CREATININE-BSD FRML MDRD: 68 ML/MIN/1.73SQ M
GLUCOSE SERPL-MCNC: 106 MG/DL (ref 65–140)
GLUCOSE SERPL-MCNC: 108 MG/DL (ref 65–140)
GLUCOSE SERPL-MCNC: 112 MG/DL (ref 65–140)
GLUCOSE SERPL-MCNC: 126 MG/DL (ref 65–140)
GLUCOSE SERPL-MCNC: 128 MG/DL (ref 65–140)
POTASSIUM SERPL-SCNC: 4.4 MMOL/L (ref 3.5–5.3)
SODIUM SERPL-SCNC: 138 MMOL/L (ref 136–145)

## 2017-12-03 PROCEDURE — 94640 AIRWAY INHALATION TREATMENT: CPT

## 2017-12-03 PROCEDURE — 87070 CULTURE OTHR SPECIMN AEROBIC: CPT | Performed by: INTERNAL MEDICINE

## 2017-12-03 PROCEDURE — 87077 CULTURE AEROBIC IDENTIFY: CPT | Performed by: INTERNAL MEDICINE

## 2017-12-03 PROCEDURE — 94760 N-INVAS EAR/PLS OXIMETRY 1: CPT

## 2017-12-03 PROCEDURE — 94668 MNPJ CHEST WALL SBSQ: CPT

## 2017-12-03 PROCEDURE — 87205 SMEAR GRAM STAIN: CPT | Performed by: INTERNAL MEDICINE

## 2017-12-03 PROCEDURE — 82948 REAGENT STRIP/BLOOD GLUCOSE: CPT

## 2017-12-03 PROCEDURE — 87186 SC STD MICRODIL/AGAR DIL: CPT | Performed by: INTERNAL MEDICINE

## 2017-12-03 PROCEDURE — 80048 BASIC METABOLIC PNL TOTAL CA: CPT | Performed by: INTERNAL MEDICINE

## 2017-12-03 RX ADMIN — IPRATROPIUM BROMIDE 0.5 MG: 0.5 SOLUTION RESPIRATORY (INHALATION) at 20:15

## 2017-12-03 RX ADMIN — PANTOPRAZOLE SODIUM 20 MG: 20 TABLET, DELAYED RELEASE ORAL at 05:03

## 2017-12-03 RX ADMIN — GUAIFENESIN 600 MG: 600 TABLET, EXTENDED RELEASE ORAL at 09:02

## 2017-12-03 RX ADMIN — DOXYCYCLINE 100 MG: 100 CAPSULE ORAL at 21:18

## 2017-12-03 RX ADMIN — FUROSEMIDE 40 MG: 40 TABLET ORAL at 09:02

## 2017-12-03 RX ADMIN — POTASSIUM CHLORIDE 40 MEQ: 1500 TABLET, EXTENDED RELEASE ORAL at 09:02

## 2017-12-03 RX ADMIN — IPRATROPIUM BROMIDE 0.5 MG: 0.5 SOLUTION RESPIRATORY (INHALATION) at 14:49

## 2017-12-03 RX ADMIN — IPRATROPIUM BROMIDE 0.5 MG: 0.5 SOLUTION RESPIRATORY (INHALATION) at 09:01

## 2017-12-03 RX ADMIN — METFORMIN HYDROCHLORIDE 500 MG: 500 TABLET, FILM COATED ORAL at 16:36

## 2017-12-03 RX ADMIN — FLUTICASONE PROPIONATE AND SALMETEROL 1 PUFF: 50; 250 POWDER RESPIRATORY (INHALATION) at 11:35

## 2017-12-03 RX ADMIN — PRAVASTATIN SODIUM 20 MG: 20 TABLET ORAL at 16:36

## 2017-12-03 RX ADMIN — LEVALBUTEROL HYDROCHLORIDE 1.25 MG: 1.25 SOLUTION, CONCENTRATE RESPIRATORY (INHALATION) at 20:15

## 2017-12-03 RX ADMIN — METOPROLOL SUCCINATE 25 MG: 25 TABLET, FILM COATED, EXTENDED RELEASE ORAL at 09:02

## 2017-12-03 RX ADMIN — FLUTICASONE PROPIONATE AND SALMETEROL 1 PUFF: 50; 250 POWDER RESPIRATORY (INHALATION) at 21:18

## 2017-12-03 RX ADMIN — Medication 250 MG: at 09:03

## 2017-12-03 RX ADMIN — DOXYCYCLINE 100 MG: 100 CAPSULE ORAL at 09:02

## 2017-12-03 RX ADMIN — TAMSULOSIN HYDROCHLORIDE 0.4 MG: 0.4 CAPSULE ORAL at 16:36

## 2017-12-03 RX ADMIN — LEVALBUTEROL HYDROCHLORIDE 1.25 MG: 1.25 SOLUTION, CONCENTRATE RESPIRATORY (INHALATION) at 09:01

## 2017-12-03 RX ADMIN — LEVALBUTEROL HYDROCHLORIDE 1.25 MG: 1.25 SOLUTION, CONCENTRATE RESPIRATORY (INHALATION) at 14:49

## 2017-12-03 RX ADMIN — POTASSIUM CHLORIDE 40 MEQ: 1500 TABLET, EXTENDED RELEASE ORAL at 16:36

## 2017-12-03 RX ADMIN — Medication 250 MG: at 16:36

## 2017-12-03 RX ADMIN — GUAIFENESIN 600 MG: 600 TABLET, EXTENDED RELEASE ORAL at 21:18

## 2017-12-03 RX ADMIN — METFORMIN HYDROCHLORIDE 500 MG: 500 TABLET, FILM COATED ORAL at 09:02

## 2017-12-03 RX ADMIN — RIVAROXABAN 20 MG: 20 TABLET, FILM COATED ORAL at 16:36

## 2017-12-03 RX ADMIN — SENNOSIDES 8.6 MG: 8.6 TABLET, FILM COATED ORAL at 21:18

## 2017-12-03 NOTE — PROGRESS NOTES
[facility] Progress Note  Patient: Rosaura Rueda 81 y o  male   MRN: [id number]  PCP: Elver Jessica MD  Unit/Bed#: -01 Encounter: [id number]  Date Of Visit: 12/03/17    Assessment:    Principal Problem:    Rhabdomyolysis  Active Problems:    Type 2 diabetes mellitus (HCC)    GERD (gastroesophageal reflux disease)    BPH (benign prostatic hyperplasia)    Atrial fibrillation ([facility])    Venous stasis dermatitis of left lower extremity    Congestive cardiac failure (HCC)    CKD (chronic kidney disease)    Osteoporosis    Fall    Shortness of breath    Cough    NSTEMI (non-ST elevated myocardial infarction) ([facility])    COPD (chronic obstructive pulmonary disease) ([facility])    Hypokalemia    Abnormal chest x-ray    Pulmonary nodules    Pneumonia      Plan:    · Rhabdomyolysis status post fall  Patient CPK markedly improved  Status post IV fluids  · Elevated troponin:  This is likely a type 2 non STEMI secondary to the rhabdomyolysis  Cardiology evaluation is appreciated  Nothing further from their standpoint to be done  · Pneumonia:  Patient is going to be on doxycycline for 7 days  This is per Infectious Disease  · Diabetes mellitus type 2:  Continue sliding scale and metformin  · Chronic atrial fibrillation:  The patient is rate controlled and is on Xarelto  · Acute renal failure seems to have resolved  · Potassium is repleted  · CHF:  Patient is appears to be euvolemic  Continue on the oral Lasix  · Awaiting skilled nursing facility placement likely tomorrow      VTE Pharmacologic Prophylaxis:   Pharmacologic: Rivaroxaban (Xarelto)  Mechanical VTE Prophylaxis in Place: Yes    Patient Centered Rounds: I have performed bedside rounds with nursing staff today  Education and Discussions with Family / Patient:  Patient    Time Spent for Care: 20 minutes  More than 50% of total time spent on counseling and coordination of care as described above      Current Length of Stay: 4 day(s)    Current Patient Status: Inpatient   Certification Statement: The patient will continue to require additional inpatient hospital stay due to Needing placement likely tomorrow    Discharge Plan:  Anticipate discharge tomorrow    Code Status: Level 1 - Full Code      Subjective:   Patient seen examined  He offers no complaints at this time  He denies any chest pain or shortness of breath  Objective:     Vitals:   Temp (24hrs), Av °F (36 7 °C), Min:97 3 °F (36 3 °C), Max:98 5 °F (36 9 °C)    HR:  [61-84] 72  Resp:  [18-19] 18  BP: (128-159)/(66-77) 144/68  SpO2:  [90 %-99 %] 98 %  Body mass index is 26 73 kg/m²  Input and Output Summary (last 24 hours):        Intake/Output Summary (Last 24 hours) at 17 0942  Last data filed at 17 0509   Gross per 24 hour   Intake              220 ml   Output              900 ml   Net             -680 ml       Physical Exam:     General Appearance:    Alert, cooperative, no distress, appears stated age                               Lungs:     Clear to auscultation bilaterally, respirations unlabored       Heart:    Regular rate and rhythm, S1 and S2 normal, no murmur, rub    or gallop   Abdomen:     Soft, non-tender, bowel sounds active all four quadrants,     no masses, no organomegaly           Extremities:   Extremities normal, atraumatic, no cyanosis or edema                       Additional Data:     Labs:      Results from last 7 days  Lab Units 17  0511   WBC Thousand/uL 6 97   HEMOGLOBIN g/dL 12 1   HEMATOCRIT % 39 5   PLATELETS Thousands/uL 155   NEUTROS PCT % 77*   LYMPHS PCT % 14   MONOS PCT % 7   EOS PCT % 2       Results from last 7 days  Lab Units 17  0608  17  1409   SODIUM mmol/L 138  < > 140   POTASSIUM mmol/L 4 4  < > 4 0   CHLORIDE mmol/L 105  < > 103   CO2 mmol/L 28  < > 25   BUN mg/dL 14  < > 24   CREATININE mg/dL 1 03  < > 1 62*   CALCIUM mg/dL 8 9  < > 9 1   TOTAL PROTEIN g/dL  --   --  7 1   BILIRUBIN TOTAL mg/dL  --   --  2 50*   ALK PHOS U/L  --   --  73   ALT U/L  --   --  24   AST U/L  --   --  82*   GLUCOSE RANDOM mg/dL 112  < > 93   < > = values in this interval not displayed  Results from last 7 days  Lab Units 11/29/17  1409   INR  1 80*       * I Have Reviewed All Lab Data Listed Above  * Additional Pertinent Lab Tests Reviewed: [facility]        Recent Cultures (last 7 days):       Results from last 7 days  Lab Units 12/01/17  0312   LEGIONELLA URINARY ANTIGEN  Negative       Last 24 Hours Medication List:     doxycycline hyclate 100 mg Oral Q12H [address]   fluticasone-salmeterol 1 puff Inhalation Q12H [address]   furosemide 40 mg Oral Daily   guaiFENesin 600 mg Oral Q12H [address]   insulin lispro 1-5 Units Subcutaneous TID AC   insulin lispro 1-5 Units Subcutaneous HS   ipratropium 0 5 mg Nebulization TID   And      levalbuterol 1 25 mg Nebulization TID   magnesium gluconate 250 mg Oral BID AC   metFORMIN 500 mg Oral BID With Meals   metoprolol succinate 25 mg Oral Daily   pantoprazole 20 mg Oral Early Morning   potassium chloride 40 mEq Oral BID With Meals   pravastatin 20 mg Oral Daily With Dinner   rivaroxaban 20 mg Oral Daily With Dinner   senna 1 tablet Oral HS   tamsulosin 0 4 mg Oral Daily With Dinner        Today, Patient Was Seen By: Donald Alanis MD    ** Please Note: Dragon 360 Dictation voice to text software may have been used in the creation of this document   **

## 2017-12-03 NOTE — RESPIRATORY THERAPY NOTE
Patient had good day today b s  Clear and decreased good slightly congested cough producing small thick clear to pale yellow secretions  Plan: continue current treatment frequency as ordered

## 2017-12-03 NOTE — RESPIRATORY THERAPY NOTE
Patient remains on room air   patient is LOS# 4 s/p being admitted for Rhabdomyolysis complications worsening due to pneumonia  B s  Clear and decreaed with scattered coarseness patient has good congested occasionally productive cough tolerating flutter valve well  Plan: continue current treatment frequency as ordered

## 2017-12-03 NOTE — PROGRESS NOTES
Progress Note - Cardiology     Cline Heritage 81 y o  male MRN: [id number]  Unit/Bed#: -01 Encounter: [id number]      Subjective:   Feeling better  Less SOB  Patient denies chest pain, palpitations, lightheadedness  Objective:   Vitals:  Vitals:    12/03/17 0905   BP:    Pulse:    Resp:    Temp:    SpO2: 98%       Body mass index is 26 73 kg/m²  Systolic (06WVQ), BVH:234 , Min:128 , ZRH:052     Diastolic (19YGD), AYN:36, Min:66, Max:77      Intake/Output Summary (Last 24 hours) at 12/03/17 1012  Last data filed at 12/03/17 0509   Gross per 24 hour   Intake              220 ml   Output              600 ml   Net             -380 ml     Weight (last 2 days)     Date/Time   Weight    12/03/17 0600  82 1 (181)    12/02/17 0600  81 3 (179 23)    12/01/17 0600  80 2 (176 81)              PHYSICAL EXAM:  General: Patient is not in acute distress  Laying comfortably in the bed  Awake, alert, responding to commands  Head: Normocephalic  Atraumatic  HEENT: Nonicteric  Neck: JVP not raised  Trachea central  No thyromegaly  Respiratory: Bilateral bronchovascular breath sounds with no crackles or rhonchi  Cardiovascular: RRR  S1 and S2 normal  No murmur, rub or gallop  GI: Abdomen soft, nontender  No guarding or rigidity  Liver and spleen not palpable  Bowel sounds present  Neurologic: Patient is awake, alert, responding to command  Moving all extremities  Integumentary:  Right lower extremity venous insufficiency changes without pitting edema   Lymphatic: No cervical lymphadenopathy  Extremities: RLE venous insufficiency changes without pitting edema   Unna boot on LLE      LABORATORY RESULTS:    Results from last 7 days  Lab Units 12/01/17  0450 11/30/17  0437 11/30/17  0008 11/29/17  2024 11/29/17  1734 11/29/17  1409   CK TOTAL U/L 536* 1,084*  --   --   --  2,573*   TROPONIN I ng/mL  --   --  2 37* 3 02* 3 58* 3 62*   CK MB INDEX % <1 0 <1 0  --   --   --  <1 0     CBC with diff:   Results from last 7 days  Lab Units 12/02/17  0511 12/01/17  0450 11/30/17  0437  11/29/17  1409   WBC Thousand/uL 6 97 7 82 10 85*  --  12 93*   HEMOGLOBIN g/dL 12 1 11 8* 12 4  --  14 2   HEMATOCRIT % 39 5 38 4 39 9  --  45 8   MCV fL 74* 73* 74*  --  74*   PLATELETS Thousands/uL 155 153 150  < > 186   MCH pg 22 5* 22 5* 22 9*  --  22 9*   MCHC g/dL 30 6* 30 7* 31 1*  --  31 0*   RDW % 17 9* 17 6* 18 0*  --  18 6*   MPV fL 9 3 9 7 8 9  < > 9 5   NRBC AUTO /100 WBCs 0 0  --   --  0   < > = values in this interval not displayed  CMP:  Results from last 7 days  Lab Units 12/03/17  0608 12/02/17  0511 12/01/17  0450  11/29/17  1409   SODIUM mmol/L 138 140 139  < > 140   POTASSIUM mmol/L 4 4 3 3* 3 5  < > 4 0   CHLORIDE mmol/L 105 106 107  < > 103   CO2 mmol/L 28 24 21  < > 25   ANION GAP mmol/L 5 10 11  < > 12   BUN mg/dL 14 16 20  < > 24   CREATININE mg/dL 1 03 1 01 1 07  < > 1 62*   GLUCOSE RANDOM mg/dL 112 117 118  < > 93   CALCIUM mg/dL 8 9 8 3 8 0*  < > 9 1   AST U/L  --   --   --   --  82*   ALT U/L  --   --   --   --  24   ALK PHOS U/L  --   --   --   --  73   TOTAL PROTEIN g/dL  --   --   --   --  7 1   ALBUMIN g/dL  --   --   --   --  2 6*   BILIRUBIN TOTAL mg/dL  --   --   --   --  2 50*   EGFR ml/min/1 73sq m 68 69 65  < > 39   < > = values in this interval not displayed      BMP:  Results from last 7 days  Lab Units 12/03/17  0608 12/02/17  0511 12/01/17  0450   SODIUM mmol/L 138 140 139   POTASSIUM mmol/L 4 4 3 3* 3 5   CHLORIDE mmol/L 105 106 107   CO2 mmol/L 28 24 21   BUN mg/dL 14 16 20   CREATININE mg/dL 1 03 1 01 1 07   GLUCOSE RANDOM mg/dL 112 117 118   CALCIUM mg/dL 8 9 8 3 8 0*       Results from last 7 days  Lab Units 12/01/17  0450 11/29/17  1409   MAGNESIUM mg/dL 1 7 1 7         Results from last 7 days  Lab Units 11/30/17  0437   HEMOGLOBIN A1C % 5 9         Results from last 7 days  Lab Units 11/29/17  1526 11/29/17  1409   TSH 3RD GENERATON uIU/mL  --  6 748*   FREE T4 ng/dL 1 02  --          Results from last 7 days  Lab Units 17  1409   INR  1 80*       Lipid Profile:   Lab Results   Component Value Date    CHOL 84 2017    CHOL 115 2017    CHOL 105 2016     Lab Results   Component Value Date    HDL 35 (L) 2017    HDL 55 2017    HDL 46 2016     Lab Results   Component Value Date    LDLCALC 37 2017    LDLCALC 50 2017    LDLCALC 52 2016     Lab Results   Component Value Date    TRIG 60 2017    TRIG 51 2017    TRIG 35 2016       Cardiac testing:   Results for orders placed during the hospital encounter of 17   Echo complete with contrast if indicated    Narrative [address] [place] [address]  [phone]    Transthoracic Echocardiogram  2D, M-mode, Doppler, and Color Doppler    Study date:  2017    Patient: Naima Benavides  MR number: [id number]  Account number: [de-identified]  : 1935  Age: 81 years  Gender: Male  Status: Inpatient  Location: Bedside  Height: 69 in  Weight: 175 lb  BP: 158/ 77 mmHg    Indications: Heart Failure    Diagnoses: I50 9 - Heart failure, unspecified    Sonographer:  HILDA Montana,RDCS  Interpreting Physician:  Courtney Malave MD  Referring Physician:  Andre Montalvo MD  Group:  [facility]    SUMMARY    LEFT VENTRICLE:  Ejection fraction was estimated to be 50 %  This study was inadequate for the evaluation of regional wall motion  RIGHT VENTRICLE:  The ventricle was dilated  Systolic function was mildly reduced  LEFT ATRIUM:  The atrium was moderately dilated  RIGHT ATRIUM:  The atrium was dilated  MITRAL VALVE:  There was trace to mild regurgitation  TRICUSPID VALVE:  There was trace regurgitation      HISTORY: PRIOR HISTORY: Atrial Fibrillation, Congestive Heart Failure, NSTEMI, Hypokalemia, Type 2 Diabetes Mellitus, Chronic Kidney Disease, Acute Kidney Injury, Shortness of Breath, Cough, Chronic Obstructive Pulmonary Disease    PROCEDURE: The procedure was performed at the bedside  This was a routine study  The transthoracic approach was used  The study included complete 2D imaging, M-mode, complete spectral Doppler, and color Doppler  The heart rate was 90 bpm,  at the start of the study  Images were obtained from the parasternal, apical, subcostal, and suprasternal notch acoustic windows  Echocardiographic views were limited due to poor acoustic window availability, decreased penetration, and  lung interference  This was a technically difficult study  LEFT VENTRICLE: Size was normal  Ejection fraction was estimated to be 50 %  This study was inadequate for the evaluation of regional wall motion  RIGHT VENTRICLE: The ventricle was dilated  Systolic function was mildly reduced  Wall thickness was normal     LEFT ATRIUM: The atrium was moderately dilated  RIGHT ATRIUM: The atrium was dilated  MITRAL VALVE: There was annular calcification  DOPPLER: There was trace to mild regurgitation  AORTIC VALVE: The valve was not well visualized  DOPPLER: There was no evidence for stenosis  TRICUSPID VALVE: The valve structure was normal  There was normal leaflet separation  DOPPLER: The transtricuspid velocity was within the normal range  There was no evidence for stenosis  There was trace regurgitation  PULMONIC VALVE: Not well visualized  PERICARDIUM: There was no pericardial effusion  The pericardium was normal in appearance  AORTA: The root exhibited normal size      SYSTEM MEASUREMENT TABLES    2D  %FS: 18 8 %  Ao Diam: 3 1 cm  EDV(Teich): 101 9 ml  EF(Teich): 38 8 %  ESV(Teich): 62 4 ml  IVSd: 1 5 cm  LA Area: 25 2 cm2  LA Diam: 5 2 cm  LVEDV MOD A4C: 74 9 ml  LVEF MOD A4C: 55 4 %  LVESV MOD A4C: 33 4 ml  LVIDd: 4 7 cm  LVIDs: 3 8 cm  LVLd A4C: 8 2 cm  LVLs A4C: 6 9 cm  LVPWd: 1 5 cm  RA Area: 25 cm2  RVIDd: 4 cm  SV MOD A4C: 41 5 ml  SV(Teich): 39 6 ml    CW  TR Vmax: 1 8 m/s  TR maxP 3 mmHg    MM  TAPSE: 1 7 cm    [facility] Accredited Echocardiography Laboratory    Prepared and electronically signed by    China Navarro MD  Signed [id number] 13:20:25       No results found for this or any previous visit  No results found for this or any previous visit  No procedure found  No results found for this or any previous visit  Imaging: I have personally reviewed pertinent reports  Procedure: Ct Chest Wo Contrast    Result Date: 2017  Narrative: CT CHEST WITHOUT IV CONTRAST INDICATION:  Follow-up pulmonary nodule  COMPARISON: Chest radiographs 2017 TECHNIQUE: CT examination of the chest was performed without intravenous contrast   Reformatted images were created in axial, sagittal, and coronal planes  Radiation dose length product (DLP) for this visit:  250 mGy-cm   This examination, like all CT scans performed in the [facility], was performed utilizing techniques to minimize radiation dose exposure, including the use of iterative reconstruction and automated exposure control  FINDINGS: LUNGS:  The lungs are well aerated  Large area of groundglass infiltrate is present in the posterior aspect of the right upper lobe  1 7 x 2 3 x 2 1 cm mildly spiculated solid soft tissue mass in the right upper lobe (series 3, image 20 and series 601, image 83)  1 4 x 1 5 x 2 4 cm mildly spiculated soft tissue mass in the posterior aspect of the right upper lobe (series 3, image 20 and series 601, image 90)  These correspond to the abnormality identified on the recent chest radiograph  Scattered groundglass infiltrates in the right middle lobe  Mildly spiculated soft tissue mass in the posterior medial aspect of the right middle lobe measuring 1 7 x 2 2 x 2 3 cm (series 3, image 44 and series 601, image 70)  Calcified granuloma right lower lobe measuring 2 2 cm (series 3, image 44)    0 9 x 0 6 x 0 9 cm mildly spiculated soft tissue mass in the anteromedial aspect of the right lower lobe (series 3, image 44 and series 601, image 92)  Emphysematous changes in the left lung with bullous disease (series 3, image 20)  Vague groundglass infiltrate in the left lower lobe  PLEURA:  Small bilateral pleural effusions, right side greater than left  Compressive atelectasis in the lower lobes bilaterally  HEART/GREAT VESSELS:  The heart is enlarged  Coronary artery calcifications  No evidence of a pericardial effusion  MEDIASTINUM AND ENID:  Prominent mediastinal lymph nodes  Hilar adenopathy unable to be assessed due to the lack of intravenous contrast material  CHEST WALL AND LOWER NECK:       Normal  VISUALIZED STRUCTURES IN THE UPPER ABDOMEN:  Stomach distended and filled with recently ingested food products  The gallbladder is contracted  There are layering calculi in the gallbladder neck  No pericholecystic inflammatory changes  Pancreas appears atrophic  OSSEOUS STRUCTURES: Osseous structures are demineralized  No acute fracture  No destructive osseous lesion  Old healed fractures involving the anterior aspects of the left 4th through 9th ribs  Old healed fractures involving the anterolateral aspects of the left 2nd and 4th through 7th ribs as well as the posterior aspect of the right 10th rib  Old wedge compression fracture involving the superior endplate of the T5 vertebral body  Degenerative changes throughout the cervical, thoracic and upper lumbar spine  Endplate irregularity involving the inferior endplate L2, likely chronic degenerative  Impression: 1  Groundglass infiltrates within the lungs bilaterally, right side greater than left  Findings most compatible with multifocal/multi lobar pneumonia  2   Multiple right-sided pulmonary nodules  Although the findings may be infectious, pulmonary neoplasm, either primary or metastatic, not excluded  Recommend adequate treatment for pneumonia with follow-up CT scan in one month    Also a consideration would be diagnostic right-sided thoracentesis with evaluation of the pleural fluid for cell count and cytology  CT-guided biopsy would be difficult, given their location  Bronchoscopy with bronchial washings from the right upper lobe is also a consideration  3   Cholelithiasis without CT evidence of acute cholecystitis    I personally discussed this study with Brandi Thompson on 11/30/2017 11:52 AM  Workstation performed: [id number]           Meds/Allergies   current meds:   Current Facility-Administered Medications   Medication Dose Route Frequency    acetaminophen (TYLENOL) tablet 650 mg  650 mg Oral Q6H PRN    doxycycline hyclate (VIBRAMYCIN) capsule 100 mg  100 mg Oral Q12H [address]    fluticasone-salmeterol (ADVAIR) 250-50 mcg/dose inhaler 1 puff  1 puff Inhalation Q12H [address]    furosemide (LASIX) tablet 40 mg  40 mg Oral Daily    guaiFENesin (MUCINEX) 12 hr tablet 600 mg  600 mg Oral Q12H [address]    insulin lispro (HumaLOG) 100 units/mL subcutaneous injection 1-5 Units  1-5 Units Subcutaneous TID AC    insulin lispro (HumaLOG) 100 units/mL subcutaneous injection 1-5 Units  1-5 Units Subcutaneous HS    ipratropium (ATROVENT) 0 02 % inhalation solution 0 5 mg  0 5 mg Nebulization TID    And    levalbuterol (XOPENEX) inhalation solution 1 25 mg  1 25 mg Nebulization TID    ipratropium-albuterol (DUO-NEB) 0 5-2 5 mg/mL inhalation solution 3 mL  3 mL Nebulization Q4H PRN    magnesium gluconate (MAGONATE) tablet 250 mg  250 mg Oral BID AC    metFORMIN (GLUCOPHAGE) tablet 500 mg  500 mg Oral BID With Meals    metoprolol succinate (TOPROL-XL) 24 hr tablet 25 mg  25 mg Oral Daily    pantoprazole (PROTONIX) EC tablet 20 mg  20 mg Oral Early Morning    potassium chloride (K-DUR,KLOR-CON) CR tablet 40 mEq  40 mEq Oral BID With Meals    pravastatin (PRAVACHOL) tablet 20 mg  20 mg Oral Daily With Dinner    rivaroxaban (XARELTO) tablet 20 mg  20 mg Oral Daily With Dinner    senna (SENOKOT) tablet 8 6 mg  1 tablet Oral HS    tamsulosin (FLOMAX) capsule 0 4 mg  0 4 mg Oral Daily With Dinner     Prescriptions Prior to Admission   Medication    alendronate (FOSAMAX) 70 mg tablet    fluticasone-salmeterol (ADVAIR DISKUS) 250-50 mcg/dose inhaler    furosemide (LASIX) 40 mg tablet    Lancets (FREESTYLE) lancets    magnesium chloride-calcium (SLOW-MAG) 71 5-119 mg    metFORMIN (GLUCOPHAGE-XR) 500 mg 24 hr tablet    metoprolol succinate (TOPROL-XL) 25 mg 24 hr tablet    potassium chloride (KLOR-CON M20) 20 mEq tablet    rivaroxaban (XARELTO) 15 mg tablet    simvastatin (ZOCOR) 20 mg tablet    tamsulosin (FLOMAX) 0 4 mg    Omeprazole 20 MG TBEC              Assessment and Plan:     1   NSTEMI, likely type 2 due to fall and rhabdomyolysis but primary cardiac cause cannot be ruled out  Heparin drip was held off due to the clinical picture being more convincing for rhabdomyolysis + patient would have been at high bleeding risk due to being on Xarelto + pt had initial poor renal function (since recovered)  ECHO with EF 50%        2   A Fib / flutter: mostly rate controlled with occasional wide complex beats (PVCs vs aberrantly conducted beats)   Rate controlled on Toprol   Continue Xarelto for anticoagulation      3   Chronic systolic heart failure:  Continue Lasix, Toprol  Will consider adding Ace inhibitor or ARB once renal function stabilizes      4   CAD S/P stents x2: [facility]  Continue statin, beta-blocker   No aspirin due to high bleeding risk along with Xarelto     5   Hypertension:  Stable  Continue all antihypertensive medications      6   Hyperlipidemia:  Continue statin       ** Please Note: Dictation voice to text software may have been used in the creation of this document   **

## 2017-12-03 NOTE — RESPIRATORY THERAPY NOTE
PT found resting comfortably in bed on RA at this time  No resp distress was noted upon assessment  Treatment was given and pt tolerated flutter with good technique  Current plan is to continue with treatment schedule as ordered

## 2017-12-03 NOTE — PLAN OF CARE
Problem: Potential for Falls  Goal: Patient will remain free of falls  INTERVENTIONS:  - Assess patient frequently for physical needs  -  Identify cognitive and physical deficits and behaviors that affect risk of falls    -  [place] fall precautions as indicated by assessment   - Educate patient/family on patient safety including physical limitations  - Instruct patient to call for assistance with activity based on assessment  - Modify environment to reduce risk of injury  - Consider OT/PT consult to assist with strengthening/mobility   Outcome: Progressing      Problem: Prexisting or High Potential for Compromised Skin Integrity  Goal: Skin integrity is maintained or improved  INTERVENTIONS:  - Identify patients at risk for skin breakdown  - Assess and monitor skin integrity  - Assess and monitor nutrition and hydration status  - Monitor labs (i e  albumin)  - Assess for incontinence   - Turn and reposition patient  - Assist with mobility/ambulation  - Relieve pressure over bony prominences  - Avoid friction and shearing  - Provide appropriate hygiene as needed including keeping skin clean and dry  - Evaluate need for skin moisturizer/barrier cream  - Collaborate with interdisciplinary team (i e  Nutrition, Rehabilitation, etc )   - Patient/family teaching   Outcome: Progressing      Problem: DISCHARGE PLANNING  Goal: Discharge to home or other facility with appropriate resources  INTERVENTIONS:  - Identify barriers to discharge w/patient and caregiver  - Arrange for needed discharge resources and transportation as appropriate  - Identify discharge learning needs (meds, wound care, etc )  - Arrange for interpretive services to assist at discharge as needed  - Refer to Case Management Department for coordinating discharge planning if the patient needs post-hospital services based on physician/advanced practitioner order or complex needs related to functional status, cognitive ability, or social support system Outcome: Progressing      Problem: Knowledge Deficit  Goal: Patient/family/caregiver demonstrates understanding of disease process, treatment plan, medications, and discharge instructions  Complete learning assessment and assess knowledge base    Interventions:  - Provide teaching at level of understanding  - Provide teaching via preferred learning methods   Outcome: Progressing      Problem: SKIN/TISSUE INTEGRITY - ADULT  Goal: Skin integrity remains intact  INTERVENTIONS  - Identify patients at risk for skin breakdown  - Assess and monitor skin integrity  - Assess and monitor nutrition and hydration status  - Monitor labs (i e  albumin)  - Assess for incontinence   - Turn and reposition patient  - Assist with mobility/ambulation  - Relieve pressure over bony prominences  - Avoid friction and shearing  - Provide appropriate hygiene as needed including keeping skin clean and dry  - Evaluate need for skin moisturizer/barrier cream  - Collaborate with interdisciplinary team (i e  Nutrition, Rehabilitation, etc )   - Patient/family teaching   Outcome: Progressing      Problem: MUSCULOSKELETAL - ADULT  Goal: Maintain or return mobility to safest level of function  INTERVENTIONS:  - Assess patient's ability to carry out ADLs; assess patient's baseline for ADL function and identify physical deficits which impact ability to perform ADLs (bathing, care of mouth/teeth, toileting, grooming, dressing, etc )  - Assess/evaluate cause of self-care deficits   - Assess range of motion  - Assess patient's mobility; develop plan if impaired  - Assess patient's need for assistive devices and provide as appropriate  - Encourage maximum independence but intervene and supervise when necessary  - Involve family in performance of ADLs  - Assess for home care needs following discharge   - Request OT consult to assist with ADL evaluation and planning for discharge  - Provide patient education as appropriate   Outcome: Progressing      Problem: DISCHARGE PLANNING - CARE MANAGEMENT  Goal: Discharge to post-acute care or home with appropriate resources  INTERVENTIONS:  - Conduct assessment to determine patient/family and health care team treatment goals, and need for post-acute services based on payer coverage, community resources, and patient preferences, and barriers to discharge  - Address psychosocial, clinical, and financial barriers to discharge as identified in assessment in conjunction with the patient/family and health care team  - Arrange appropriate level of post-acute services according to patients   needs and preference and payer coverage in collaboration with the physician and health care team  - Communicate with and update the patient/family, physician, and health care team regarding progress on the discharge plan  - Arrange appropriate transportation to post-acute venues   Outcome: Progressing

## 2017-12-04 ENCOUNTER — ALLSCRIPTS OFFICE VISIT (OUTPATIENT)
Dept: OTHER | Facility: OTHER | Age: 82
End: 2017-12-04

## 2017-12-04 VITALS
HEART RATE: 71 BPM | SYSTOLIC BLOOD PRESSURE: 119 MMHG | HEIGHT: 69 IN | BODY MASS INDEX: 27.27 KG/M2 | RESPIRATION RATE: 18 BRPM | TEMPERATURE: 98 F | DIASTOLIC BLOOD PRESSURE: 58 MMHG | OXYGEN SATURATION: 98 % | WEIGHT: 184.08 LBS

## 2017-12-04 LAB
GLUCOSE SERPL-MCNC: 127 MG/DL (ref 65–140)
GLUCOSE SERPL-MCNC: 95 MG/DL (ref 65–140)

## 2017-12-04 PROCEDURE — 94640 AIRWAY INHALATION TREATMENT: CPT

## 2017-12-04 PROCEDURE — 82948 REAGENT STRIP/BLOOD GLUCOSE: CPT

## 2017-12-04 PROCEDURE — 94668 MNPJ CHEST WALL SBSQ: CPT

## 2017-12-04 PROCEDURE — 94760 N-INVAS EAR/PLS OXIMETRY 1: CPT

## 2017-12-04 RX ORDER — LEVALBUTEROL 1.25 MG/.5ML
1.25 SOLUTION, CONCENTRATE RESPIRATORY (INHALATION)
Qty: 1 EACH | Refills: 0 | Status: SHIPPED | OUTPATIENT
Start: 2017-12-04 | End: 2019-04-24 | Stop reason: CLARIF

## 2017-12-04 RX ORDER — DOXYCYCLINE HYCLATE 100 MG/1
100 CAPSULE ORAL EVERY 12 HOURS SCHEDULED
Qty: 20 CAPSULE | Refills: 0 | Status: SHIPPED | OUTPATIENT
Start: 2017-12-04 | End: 2017-12-08

## 2017-12-04 RX ADMIN — METOPROLOL SUCCINATE 25 MG: 25 TABLET, FILM COATED, EXTENDED RELEASE ORAL at 09:22

## 2017-12-04 RX ADMIN — POTASSIUM CHLORIDE 40 MEQ: 1500 TABLET, EXTENDED RELEASE ORAL at 09:22

## 2017-12-04 RX ADMIN — METFORMIN HYDROCHLORIDE 500 MG: 500 TABLET, FILM COATED ORAL at 09:23

## 2017-12-04 RX ADMIN — FUROSEMIDE 40 MG: 40 TABLET ORAL at 09:22

## 2017-12-04 RX ADMIN — LEVALBUTEROL HYDROCHLORIDE 1.25 MG: 1.25 SOLUTION, CONCENTRATE RESPIRATORY (INHALATION) at 07:41

## 2017-12-04 RX ADMIN — IPRATROPIUM BROMIDE 0.5 MG: 0.5 SOLUTION RESPIRATORY (INHALATION) at 07:41

## 2017-12-04 RX ADMIN — FLUTICASONE PROPIONATE AND SALMETEROL 1 PUFF: 50; 250 POWDER RESPIRATORY (INHALATION) at 09:23

## 2017-12-04 RX ADMIN — PANTOPRAZOLE SODIUM 20 MG: 20 TABLET, DELAYED RELEASE ORAL at 05:24

## 2017-12-04 RX ADMIN — DOXYCYCLINE 100 MG: 100 CAPSULE ORAL at 09:23

## 2017-12-04 RX ADMIN — Medication 250 MG: at 09:22

## 2017-12-04 NOTE — SOCIAL WORK
CM arranged transportation with [facility]  Pt will be transported at 1500  CM informed pt's daughter, Naga and nurse  CM reviewed IMM with pt  Pt was agreeable  CM provided a copy to pt and placed original in pt's medical record  Pt has no other needs at this time  CM department to follow pt through discharge

## 2017-12-04 NOTE — RESPIRATORY THERAPY NOTE
80 yo male admitted for Rhabdomyolysis complications worsening due to pneumonia  Pt is comfortable on RA with clear and decreaed with scattered coarseness  Pt is tolerating nebulizer treatments and makes good effort with the flutter valve  PT has occasional productive cough  Will continue with current treatment plan

## 2017-12-04 NOTE — PLAN OF CARE
Problem: Potential for Falls  Goal: Patient will remain free of falls  INTERVENTIONS:  - Assess patient frequently for physical needs  -  Identify cognitive and physical deficits and behaviors that affect risk of falls    -  [place] fall precautions as indicated by assessment   - Educate patient/family on patient safety including physical limitations  - Instruct patient to call for assistance with activity based on assessment  - Modify environment to reduce risk of injury  - Consider OT/PT consult to assist with strengthening/mobility   Outcome: Progressing      Problem: Prexisting or High Potential for Compromised Skin Integrity  Goal: Skin integrity is maintained or improved  INTERVENTIONS:  - Identify patients at risk for skin breakdown  - Assess and monitor skin integrity  - Assess and monitor nutrition and hydration status  - Monitor labs (i e  albumin)  - Assess for incontinence   - Turn and reposition patient  - Assist with mobility/ambulation  - Relieve pressure over bony prominences  - Avoid friction and shearing  - Provide appropriate hygiene as needed including keeping skin clean and dry  - Evaluate need for skin moisturizer/barrier cream  - Collaborate with interdisciplinary team (i e  Nutrition, Rehabilitation, etc )   - Patient/family teaching   Outcome: Progressing      Problem: DISCHARGE PLANNING  Goal: Discharge to home or other facility with appropriate resources  INTERVENTIONS:  - Identify barriers to discharge w/patient and caregiver  - Arrange for needed discharge resources and transportation as appropriate  - Identify discharge learning needs (meds, wound care, etc )  - Arrange for interpretive services to assist at discharge as needed  - Refer to Case Management Department for coordinating discharge planning if the patient needs post-hospital services based on physician/advanced practitioner order or complex needs related to functional status, cognitive ability, or social support system Outcome: Progressing      Problem: Knowledge Deficit  Goal: Patient/family/caregiver demonstrates understanding of disease process, treatment plan, medications, and discharge instructions  Complete learning assessment and assess knowledge base    Interventions:  - Provide teaching at level of understanding  - Provide teaching via preferred learning methods   Outcome: Progressing      Problem: SKIN/TISSUE INTEGRITY - ADULT  Goal: Skin integrity remains intact  INTERVENTIONS  - Identify patients at risk for skin breakdown  - Assess and monitor skin integrity  - Assess and monitor nutrition and hydration status  - Monitor labs (i e  albumin)  - Assess for incontinence   - Turn and reposition patient  - Assist with mobility/ambulation  - Relieve pressure over bony prominences  - Avoid friction and shearing  - Provide appropriate hygiene as needed including keeping skin clean and dry  - Evaluate need for skin moisturizer/barrier cream  - Collaborate with interdisciplinary team (i e  Nutrition, Rehabilitation, etc )   - Patient/family teaching   Outcome: Progressing      Problem: MUSCULOSKELETAL - ADULT  Goal: Maintain or return mobility to safest level of function  INTERVENTIONS:  - Assess patient's ability to carry out ADLs; assess patient's baseline for ADL function and identify physical deficits which impact ability to perform ADLs (bathing, care of mouth/teeth, toileting, grooming, dressing, etc )  - Assess/evaluate cause of self-care deficits   - Assess range of motion  - Assess patient's mobility; develop plan if impaired  - Assess patient's need for assistive devices and provide as appropriate  - Encourage maximum independence but intervene and supervise when necessary  - Involve family in performance of ADLs  - Assess for home care needs following discharge   - Request OT consult to assist with ADL evaluation and planning for discharge  - Provide patient education as appropriate   Outcome: Progressing      Problem: DISCHARGE PLANNING - CARE MANAGEMENT  Goal: Discharge to post-acute care or home with appropriate resources  INTERVENTIONS:  - Conduct assessment to determine patient/family and health care team treatment goals, and need for post-acute services based on payer coverage, community resources, and patient preferences, and barriers to discharge  - Address psychosocial, clinical, and financial barriers to discharge as identified in assessment in conjunction with the patient/family and health care team  - Arrange appropriate level of post-acute services according to patients   needs and preference and payer coverage in collaboration with the physician and health care team  - Communicate with and update the patient/family, physician, and health care team regarding progress on the discharge plan  - Arrange appropriate transportation to post-acute venues   Outcome: Progressing

## 2017-12-04 NOTE — PROGRESS NOTES
Progress Note - Pulmonary   Tyler Null 81 y o  male MRN: [id number]  Unit/Bed#: -01 Encounter: [id number]    Assessment:  1  Abnormal CT of chest - bilateral ground-glass infiltrates with multiple right-sided pulmonary nodules measuring up to 2 4 cm   2  COPD without exacerbation  3  Rhabdomyolysis with ANNIE  4  Elevated troponin    Plan:  1  Abnormal CT of chest - bilateral ground-glass infiltrates with multiple right-sided pulmonary nodules measuring up to 2 4 cm   - currently saturating 97% on room air  - reviewed CT of chest with evidence of ground-glass opacities likely consistent with multi lobar pneumonia  Complete 7 day course of doxycycline  - continue flutter valve and incentive spirometer  - DC Mucinex  - continue nebulizer treatments with ipratropium and albuterol t i d   - large spiculated solid tissue masses in the right lung are concerning for a metastatic process, would need repeat CT of chest in 6 weeks  If still present, will discuss diagnostic options with patient and his family  Impression that family does not want to be too aggressive with diagnosis/treatment  Will discuss further as an outpatient  2   COPD without exacerbation   - continue Advair 1 puff twice daily  - nebulizers as above  - continue to monitor off steroids  3  Rhabdomyolysis with acute kidney injury - improving  - management per PCP  4  Elevated troponin  - continue to trend  - cardiology following    Patient is stable from a pulmonary standpoint for discharge        Chief Complaint:   No complaints    Subjective:   Shortness of breath and cough have significantly improved since admission  Patient reports he has not coughed up any mucus today  Objective:     Vitals: Blood pressure 158/74, pulse 86, temperature 97 6 °F (36 4 °C), temperature source Oral, resp  rate 18, height 5' 9" (1 753 m), weight 83 5 kg (184 lb 1 4 oz), SpO2 97 %  ,Body mass index is 27 18 kg/m²        Intake/Output Summary (Last 24 hours) at 12/04/17 0854  Last data filed at 12/04/17 4895   Gross per 24 hour   Intake                0 ml   Output             2525 ml   Net            -2525 ml       Invasive Devices     Peripheral Intravenous Line            Peripheral IV 11/29/17 Left Antecubital 4 days                Physical Exam: /74   Pulse 86   Temp 97 6 °F (36 4 °C) (Oral)   Resp 18   Ht 5' 9" (1 753 m)   Wt 83 5 kg (184 lb 1 4 oz)   SpO2 97%   BMI 27 18 kg/m²   General appearance: alert, cooperative and Chronically ill-appearing  Head: Normocephalic, without obvious abnormality, atraumatic  Lungs: clear to auscultation bilaterally  Heart: regular rate and rhythm, S1, S2 normal, no murmur, click, rub or gallop  Extremities: extremities normal, atraumatic, no cyanosis or edema  Skin: Skin color, texture, turgor normal  No rashes or lesions  Neurologic: Mental status: Alert, oriented, thought content appropriate     Labs: I have personally reviewed pertinent lab results  Imaging and other studies: I have personally reviewed pertinent reports

## 2017-12-04 NOTE — DISCHARGE SUMMARY
Discharge Summary - Chiqui Bae 81 y o  male MRN: [id number]  Unit/Bed#: -01 Encounter: [id number]    Admission Date:    11/29/2017   Discharge Date:   12/04/17   Admitting Diagnosis:   Rhabdomyolysis [M62 82]  Hyperbilirubinemia [E80 6]  Arm pain [M79 603]  Contusion, multiple sites [T07  XXXA]  Abnormal EKG [R94 31]  Elevated TSH [R94 6]  Elevated troponin [R74 8]  NSTEMI (non-ST elevated myocardial infarction) (HCC) [I21 4]  Anticoagulant long-term use [Z79 01]  ANNIE (acute kidney injury) ([facility] [address] ) [N17 9]  Abrasion of right hand, initial encounter [S60 511A]  Fall on same level from slipping, tripping or stumbling, initial encounter [W01  0XXA]  Venous stasis dermatitis of left lower extremity [I87 2]  Skin tear of left forearm without complication, initial encounter [G10 820N]  Admitting Provider:   Cecilia Gaytan MD  Discharge Provider:   Jad Padilla MD     Primary Care Physician at Discharge:   Jad Padilla MD,[phone]    HPI:   Chiqui Bae is a 81 y o  male with a PMH of Afib on Xarelto, CHF, GERD, DM, Stasis dermatitis, BPH, HLD, CKD and MI with stents who presents with with a mechanical fall  The patient states that he tripped over his slipper after turning around "180 degrees" and fell  This occurred at approximately 10 pm last night  Given that he lives alone, there was no one to help him  He was unable to get to a phone until this morning but up until that point he had been "crawling" around on the floor  When he was able to get to a phone at appx 10 am this morning, he called his oldest daughter and her and her  came to his apartment and assisted him off of the floor into a chair  At this time he was brought to the hospital for further evaluation  The patient also states that he has been feeling progressively SOB over the past week and reports a some nasal congestion as well as a wet, loose cough that is productive for white sputum  Denies fevers or chills  Per the patients daughters, he has also seemed to have been progressively weak over the past week  The patient complained of some generalized "aches and pains" and "stiffness" but attributes this to being on the floor for about 10-11 hours  Denies any chest pain, abdominal pain, nausea, vomiting or diarrhea          Procedures Performed:   Orders Placed This Encounter   Procedures   283 AlterG ED ECG Documentation Only       Hospital Course:   Rhabdomyolysis-patient was admitted with CK greater than 2500 which gradually decreased to 536 over the next 2 days with intravenous fluids, it was not tested subsequently  He did not have any significant muscle pain during this admission  Type 2 non ST elevation MI-patient is admission troponin was 3 68  He was evaluated by Cardiology, this was felt secondary to rhabdomyolysis  Patient was not started on aspirin or heparin because of an increased risk of bleeding as he is fully anticoagulated on Xarelto  Troponin trended down to 2 37 by hospital day 2   Echocardiogram showed ejection fraction 50% with no wall motion abnormality  Cardiology followed the patient and did not feel further workup or treatment was indicated  Multi lobar pneumonia-patient had productive cough for approximately 1 month prior to this admission  Admission chest x-ray showed density in right upper lobe, subsequent CT scan revealed multi lobar ground-glass infiltrates right greater than left as well as predominantly right-sided pulmonary nodules up to 2 4 cm  He was noted to have small pleural effusions bilaterally  Pulmonary and Infectious Disease were consulted  All were in agreement with conservative measures initially  Patient has stated cephalosporin allergy so he was treated with oral doxycycline  Clinically he improved with decreased cough and improved lung exam   Plan is to follow-up CT scan in 4-6 weeks    If nodules have not improved, further workup was discussed with patient and his daughter that may include thoracentesis or bronchoscopy  The nodules are deep in the lungs and radiology noted these would be difficult to safely biopsy percutaneously  COPD exacerbation was treated with nebulizers and lung examination improved throughout the hospitalization  Acute kidney injury-Admission creatinine was 1 34 with estimated GFR 49  With hydration serum creatinine normalized to 1 03 with an estimated GFR 68 by December 3rd  This was felt secondary to volume depletion and rhabdomyolysis  Atrial fibrillation-patient was maintained on Xarelto, heart rate was stable throughout the hospitalization  Patient had mild hypokalemia treated with increasing his oral potassium to 40 mEq twice daily  CHF-patient has borderline low ejection fraction  Heart failure appeared to be well compensated throughout the hospitalization despite intravenous fluids initially  Debility with weakness and falls-patient was evaluated by Physical therapy and Occupational therapy and deemed appropriate for discharge to subacute rehab  Consulting Providers   Cardiology, Infectious Disease, Pulmonary    Significant Findings, Care, Treatment and Services Provided:   CT chest:IMPRESSION:  1  Groundglass infiltrates within the lungs bilaterally, right side greater than left  Findings most compatible with multifocal/multi lobar pneumonia  2   Multiple right-sided pulmonary nodules  Although the findings may be infectious, pulmonary neoplasm, either primary or metastatic, not excluded  Recommend adequate treatment for pneumonia with follow-up CT scan in one month  Also a consideration   would be diagnostic right-sided thoracentesis with evaluation of the pleural fluid for cell count and cytology  CT-guided biopsy would be difficult, given their location  Bronchoscopy with bronchial washings from the right upper lobe is also a   consideration    3   Cholelithiasis without CT evidence of acute cholecystitis  CT brain:IMPRESSION:     No acute intracranial abnormality  CT cervical spine:IMPRESSION:     No cervical spine fracture or traumatic malalignment  Echocardiogram:    Complications:   None  Physical Exam:  General Appearance:    Alert, cooperative, no distress   Head:    Normocephalic, without obvious abnormality, atraumatic   Eyes:    PERRL, conjunctiva/corneas clear, EOM's intact               Neck:   Supple, symmetrical, trachea midline, no JVD   Lungs:     Faint crackles and diminish breath sounds ed   Heart:    Regular rate and rhythm, S1 and S2 normal, 1/6 systolic murmur, no rub   or gallop   Abdomen: Soft, non-tender, positive bowel sounds, no masses, no organomegaly   Extremities:  [place]ron[place] venous insufficiency changes right lower extremity, left lower extremity in Un[place] boot c   Neurologic:     CNII-XII intact      Labs:   Lab Results   Component Value Date    WBC 6 97 12/02/2017    WBC 12 45 (H) 09/21/2015    RBC 5 37 12/02/2017    RBC 6 12 (H) 09/21/2015    HGB 12 1 12/02/2017    HGB 17 3 (H) 09/21/2015    HCT 39 5 12/02/2017    HCT 52 4 (H) 09/21/2015    MCV 74 (L) 12/02/2017    MCV 86 09/21/2015    MCH 22 5 (L) 12/02/2017    MCH 28 3 09/21/2015    RDW 17 9 (H) 12/02/2017    RDW 14 7 09/21/2015     12/02/2017     09/21/2015     Lab Results   Component Value Date    CREATININE 1 03 12/03/2017    CREATININE 1 19 09/21/2015    BUN 14 12/03/2017    BUN 11 09/21/2015     12/03/2017     09/21/2015    K 4 4 12/03/2017    K 3 9 09/21/2015     12/03/2017     09/21/2015    CO2 28 12/03/2017    CO2 34 (H) 09/21/2015    GLUCOSE 112 12/03/2017    GLUCOSE 149 (H) 09/21/2015    PROT 7 1 11/29/2017    PROT 7 1 09/21/2015    ALKPHOS 73 11/29/2017    ALKPHOS 85 09/21/2015    ALT 24 11/29/2017    ALT 20 09/21/2015    AST 82 (H) 11/29/2017    AST 14 09/21/2015    BILIDIR 0 56 (H) 11/29/2017    BILIDIR 0 23 (H) 09/21/2015       Treatments:  IV hydration, oral doxycycline, albuterol and Atrovent nebulizers    Discharge Diagnosis:   Principal Problem:    Rhabdomyolysis  Active Problems:    Type 2 diabetes mellitus (HCC)    GERD (gastroesophageal reflux disease)    BPH (benign prostatic hyperplasia)    Atrial fibrillation (HCC)    Venous stasis dermatitis of left lower extremity    Congestive cardiac failure (HCC)    CKD (chronic kidney disease)    Osteoporosis    Fall    Shortness of breath    Cough    NSTEMI (non-ST elevated myocardial infarction) ([facility])    COPD (chronic obstructive pulmonary disease) ([facility])    Hypokalemia    Abnormal chest x-ray    Pulmonary nodules    Pneumonia  Resolved Problems:    Acute kidney injury ([facility] [address] )      Condition at Discharge:   Good     Code Status: Level 1 - Full Code  Advance Directive and Living Will: <no information>  Power of :    POLST:      Discharge instructions/Information to patient and family:   See after visit summary for information provided to patient and family  Provisions for Follow-Up Care:  See after visit summary for information related to follow-up care and any pertinent home health orders  Disposition:   Skilled nursing facility at [facility]    Planned Readmission:   No    Discharge Statement   I spent 35 minutes discharging the patient  This time was spent on the day of discharge  I had direct contact with the patient on the day of discharge  Additional documentation is required if more than 30 minutes were spent on discharge  Greater than 50% of the total time was spent examining patient, answering all patient questions, arranging and discussing plan of care with patient as well as directly providing post-discharge instructions  Additional time then spent on discharge activities  Discharge Medications:  See after visit summary for reconciled discharge medications provided to patient and family

## 2017-12-04 NOTE — RESPIRATORY THERAPY NOTE
82 yo male  Currently no resp distress  PT resting comfortably on RA    Will continue with current plan

## 2017-12-04 NOTE — PROGRESS NOTES
General Cardiology   Progress Note   Corinne Guard 81 y o  male MRN: [id number]  Unit/Bed#: -01 Encounter: [id number]      SUBJECTIVE:   No new changes clinically  Patient is possibly getting discharged today  Patient seemed to have recovered from rhabdomyolysis  Patient has an abnormal CT of the chest which will get followed as an outpatient through Pulmonary and PCP  REVIEW OF SYSTEMS:  Constitutional:  Denies fever or chills   Eyes:  Denies change in visual acuity   HENT:  Denies nasal congestion or sore throat   Respiratory:  Denies cough or shortness of breath   Cardiovascular:  Denies chest pain or edema   GI:  Denies abdominal pain, nausea, vomiting, bloody stools or diarrhea   :  Denies dysuria, frequency, difficulty in micturition and nocturia  Musculoskeletal:  Denies back pain or joint pain   Neurologic:  Denies headache, focal weakness or sensory changes   Endocrine:  Denies polyuria or polydipsia   Lymphatic:  Denies swollen glands   Psychiatric:  Denies depression or anxiety     OBJECTIVE:   Vitals:  Vitals:    17 1100   BP: 119/58   Pulse: 71   Resp: 18   Temp: 98 °F (36 7 °C)   SpO2: 98%     Body mass index is 27 18 kg/m²  Systolic (38TGC), ML , Min:125 , VHO:766     Diastolic (86ZFD), WTO:86, Min:70, Max:76      Intake/Output Summary (Last 24 hours) at 17 1030  Last data filed at 17 0525   Gross per 24 hour   Intake                0 ml   Output             2525 ml   Net            -2525 ml     Weight (last 2 days)     Date/Time   Weight    17 0600  83 5 (184 08)    17 0600  82 1 (181)    17 0600  81 3 (179 23)                  PHYSICAL EXAMS:  General:  Patient is not in acute distress, laying in the bed comfortably, awake, alert responding to commands  Head: Normocephalic, Atraumatic  HEENT:  Both pupils normal-size atraumatic, normocephalic, nonicteric  Neck:  JVP not raised   Trachea central  Respiratory:  Decreased breath sounds bilaterally  Cardiovascular:  Irregularly irregular  GI:  Abdomen soft nontender  Liver and spleen normal size  Lymphatic:  No cervical or inguinal lymphadenopathy  Neurologic:  Patient is awake alert, responding to command, well-oriented to time and place and person moving     LABORATORY RESULTS:    Results from last 7 days  Lab Units 12/01/17  0450 11/30/17 0437 11/30/17  0008 11/29/17  2024 11/29/17  1734 11/29/17  1409   CK TOTAL U/L 536* 1,084*  --   --   --  2,573*   TROPONIN I ng/mL  --   --  2 37* 3 02* 3 58* 3 62*   CK MB INDEX % <1 0 <1 0  --   --   --  <1 0       CBC with diff:   Results from last 7 days  Lab Units 12/02/17  0511 12/01/17 0450 11/30/17 0437 11/29/17  1409   WBC Thousand/uL 6 97 7 82 10 85*  --  12 93*   HEMOGLOBIN g/dL 12 1 11 8* 12 4  --  14 2   HEMATOCRIT % 39 5 38 4 39 9  --  45 8   MCV fL 74* 73* 74*  --  74*   PLATELETS Thousands/uL 155 153 150  < > 186   MCH pg 22 5* 22 5* 22 9*  --  22 9*   MCHC g/dL 30 6* 30 7* 31 1*  --  31 0*   RDW % 17 9* 17 6* 18 0*  --  18 6*   MPV fL 9 3 9 7 8 9  < > 9 5   NRBC AUTO /100 WBCs 0 0  --   --  0   < > = values in this interval not displayed  CMP:  Results from last 7 days  Lab Units 12/03/17  0608 12/02/17  0511 12/01/17 0450 11/29/17  1409   SODIUM mmol/L 138 140 139  < > 140   POTASSIUM mmol/L 4 4 3 3* 3 5  < > 4 0   CHLORIDE mmol/L 105 106 107  < > 103   CO2 mmol/L 28 24 21  < > 25   ANION GAP mmol/L 5 10 11  < > 12   BUN mg/dL 14 16 20  < > 24   CREATININE mg/dL 1 03 1 01 1 07  < > 1 62*   GLUCOSE RANDOM mg/dL 112 117 118  < > 93   CALCIUM mg/dL 8 9 8 3 8 0*  < > 9 1   AST U/L  --   --   --   --  82*   ALT U/L  --   --   --   --  24   ALK PHOS U/L  --   --   --   --  73   TOTAL PROTEIN g/dL  --   --   --   --  7 1   ALBUMIN g/dL  --   --   --   --  2 6*   BILIRUBIN TOTAL mg/dL  --   --   --   --  2 50*   EGFR ml/min/1 73sq m 68 69 65  < > 39   < > = values in this interval not displayed      BMP:  Results from last 7 days  Lab Units 17  0608 17  0511 17  0450   SODIUM mmol/L 138 140 139   POTASSIUM mmol/L 4 4 3 3* 3 5   CHLORIDE mmol/L 105 106 107   CO2 mmol/L 28 24 21   BUN mg/dL 14 16 20   CREATININE mg/dL 1 03 1 01 1 07   GLUCOSE RANDOM mg/dL 112 117 118   CALCIUM mg/dL 8 9 8 3 8 0*              Results from last 7 days  Lab Units 17  0450 17  1409   MAGNESIUM mg/dL 1 7 1 7       Results from last 7 days  Lab Units 17  0437   HEMOGLOBIN A1C % 5 9       Results from last 7 days  Lab Units 17  1526 17  1409   TSH 3RD GENERATON uIU/mL  --  6 748*   FREE T4 ng/dL 1 02  --        Results from last 7 days  Lab Units 17  1409   INR  1 80*       Lipid Profile:   Lab Results   Component Value Date    CHOL 84 2017    CHOL 115 2017    CHOL 105 2016     Lab Results   Component Value Date    HDL 35 (L) 2017    HDL 55 2017    HDL 46 2016     Lab Results   Component Value Date    LDLCALC 37 2017    LDLCALC 50 2017    LDLCALC 52 2016     Lab Results   Component Value Date    TRIG 60 2017    TRIG 51 2017    TRIG 35 2016       Cardiac testing:  Results for orders placed during the hospital encounter of 17   Echo complete with contrast if indicated    Narrative [address] [place] [phone]    Transthoracic Echocardiogram  2D, M-mode, Doppler, and Color Doppler    Study date:  2017    Patient: Rosa Franklin  MR number: [id number]  Account number: [de-identified]  : 1935  Age: 81 years  Gender: Male  Status: Inpatient  Location: Bedside  Height: 69 in  Weight: 175 lb  BP: 158/ 77 mmHg    Indications: Heart Failure    Diagnoses: I50 9 - Heart failure, unspecified    Sonographer:  Jeoffrey Kehr, BS,RDCS  Interpreting Physician:  Dave Jenkins MD  Referring Physician:  Vincenzo Montalvo MD  Group:  [facility]    SUMMARY    LEFT VENTRICLE:  Ejection fraction was estimated to be 50 %  This study was inadequate for the evaluation of regional wall motion  RIGHT VENTRICLE:  The ventricle was dilated  Systolic function was mildly reduced  LEFT ATRIUM:  The atrium was moderately dilated  RIGHT ATRIUM:  The atrium was dilated  MITRAL VALVE:  There was trace to mild regurgitation  TRICUSPID VALVE:  There was trace regurgitation  HISTORY: PRIOR HISTORY: Atrial Fibrillation, Congestive Heart Failure, NSTEMI, Hypokalemia, Type 2 Diabetes Mellitus, Chronic Kidney Disease, Acute Kidney Injury, Shortness of Breath, Cough, Chronic Obstructive Pulmonary Disease    PROCEDURE: The procedure was performed at the bedside  This was a routine study  The transthoracic approach was used  The study included complete 2D imaging, M-mode, complete spectral Doppler, and color Doppler  The heart rate was 90 bpm,  at the start of the study  Images were obtained from the parasternal, apical, subcostal, and suprasternal notch acoustic windows  Echocardiographic views were limited due to poor acoustic window availability, decreased penetration, and  lung interference  This was a technically difficult study  LEFT VENTRICLE: Size was normal  Ejection fraction was estimated to be 50 %  This study was inadequate for the evaluation of regional wall motion  RIGHT VENTRICLE: The ventricle was dilated  Systolic function was mildly reduced  Wall thickness was normal     LEFT ATRIUM: The atrium was moderately dilated  RIGHT ATRIUM: The atrium was dilated  MITRAL VALVE: There was annular calcification  DOPPLER: There was trace to mild regurgitation  AORTIC VALVE: The valve was not well visualized  DOPPLER: There was no evidence for stenosis  TRICUSPID VALVE: The valve structure was normal  There was normal leaflet separation  DOPPLER: The transtricuspid velocity was within the normal range   There was no evidence for stenosis  There was trace regurgitation  PULMONIC VALVE: Not well visualized  PERICARDIUM: There was no pericardial effusion  The pericardium was normal in appearance  AORTA: The root exhibited normal size  SYSTEM MEASUREMENT TABLES    2D  %FS: 18 8 %  Ao Diam: 3 1 cm  EDV(Teich): 101 9 ml  EF(Teich): 38 8 %  ESV(Teich): 62 4 ml  IVSd: 1 5 cm  LA Area: 25 2 cm2  LA Diam: 5 2 cm  LVEDV MOD A4C: 74 9 ml  LVEF MOD A4C: 55 4 %  LVESV MOD A4C: 33 4 ml  LVIDd: 4 7 cm  LVIDs: 3 8 cm  LVLd A4C: 8 2 cm  LVLs A4C: 6 9 cm  LVPWd: 1 5 cm  RA Area: 25 cm2  RVIDd: 4 cm  SV MOD A4C: 41 5 ml  SV(Teich): 39 6 ml    CW  TR Vmax: 1 8 m/s  TR maxP 3 mmHg    MM  TAPSE: 1 7 cm    [facility]    Prepared and electronically signed by    Gaurav Mayfield MD  Signed [id number] 13:20:25       No results found for this or any previous visit  No results found for this or any previous visit  No procedure found  No results found for this or any previous visit  Meds/Allergies   all current active meds have been reviewed  Prescriptions Prior to Admission   Medication    alendronate (FOSAMAX) 70 mg tablet    fluticasone-salmeterol (ADVAIR DISKUS) 250-50 mcg/dose inhaler    furosemide (LASIX) 40 mg tablet    Lancets (FREESTYLE) lancets    magnesium chloride-calcium (SLOW-MAG) 71 5-119 mg    metFORMIN (GLUCOPHAGE-XR) 500 mg 24 hr tablet    metoprolol succinate (TOPROL-XL) 25 mg 24 hr tablet    potassium chloride (KLOR-CON M20) 20 mEq tablet    rivaroxaban (XARELTO) 15 mg tablet    simvastatin (ZOCOR) 20 mg tablet    tamsulosin (FLOMAX) 0 4 mg    Omeprazole 20 MG TBEC          ASSESSMENT & PLAN     Patient with atrial fibrillation/flutter  Continue rate control and anticoagulation  Patient seems to have recovered from rhabdomyolysis  Patient understands the risks and benefits of anticoagulation to prevent thromboembolic risk    Patient has an abnormal chest x-ray and CT of the chest which will be followed by Pulmonary   Patient will continue to follow up with primary care physician Dr Tiff Stokes MD  12/4/2017,10:30 AM    Portions of the record may have been created with voice recognition software   Occasional wrong word or "sound a like" substitutions may have occurred due to the inherent limitations of voice recognition software   Read the chart carefully and recognize, using context, where substitutions have occurred

## 2017-12-04 NOTE — PLAN OF CARE
Problem: DISCHARGE PLANNING - CARE MANAGEMENT  Goal: Discharge to post-acute care or home with appropriate resources  INTERVENTIONS:  - Conduct assessment to determine patient/family and health care team treatment goals, and need for post-acute services based on payer coverage, community resources, and patient preferences, and barriers to discharge  - Address psychosocial, clinical, and financial barriers to discharge as identified in assessment in conjunction with the patient/family and health care team  - Arrange appropriate level of post-acute services according to patients   needs and preference and payer coverage in collaboration with the physician and health care team  - Communicate with and update the patient/family, physician, and health care team regarding progress on the discharge plan  - Arrange appropriate transportation to post-acute venues   Outcome: Completed Date Met: 12/04/17  KASHMIR arranged transportation with [facility]  Pt will be transported at 1500  CM informed pt's daughter, Natacha Sung and nurse  KASHMIR reviewed IMM with pt  Pt was agreeable  KASHMIR provided a copy to pt and placed original in pt's medical record  Pt has no other needs at this time  CM department to follow pt through discharge

## 2017-12-05 ENCOUNTER — GENERIC CONVERSION - ENCOUNTER (OUTPATIENT)
Dept: OTHER | Facility: OTHER | Age: 82
End: 2017-12-05

## 2017-12-06 LAB
BACTERIA SPT RESP CULT: ABNORMAL
BACTERIA SPT RESP CULT: ABNORMAL
GRAM STN SPEC: ABNORMAL

## 2017-12-08 NOTE — PROCEDURES
Chief Complaint  unna boot      History of Present Illness  HPI- Derm: 18-year-old male presents for follow-up secondary to his stasis dermatitis and ulceration of his left lower leg  Patient fell at home since he was last seen and was hospitalized and presently is in [facility] [place]      Current Meds   1  Advair Diskus 250-50 MCG/DOSE Inhalation Aerosol Powder Breath Activated; TAKE 1 PUFF EVERY 12 HOURS AS NEEDED; Therapy: 19YRZ5210 to (Jacqualin )  Requested for: 72Epw2775; Last Rx:20Wry1889 Ordered   2  Alendronate Sodium 70 MG Oral Tablet; take 1 tablet by mouth every week; Therapy: 53WWR9883 to (Alverta Player)  Requested for: 72XXR7945; Last Rx:08Ltp8901 Ordered   3  FreeStyle Lancets Miscellaneous; TEST 1 QD; Therapy: 84SQL0007 to (Last Rx:15Jan2016)  Requested for: 43PMF0587 Ordered   4  FreeStyle Lite Test In [employer]; test twice daily; Therapy: 54DEM4535 to (Evaluate:10Nov2016)  Requested for: 67VDG0578; Last Rx:15Jan2016 Ordered   5  Furosemide 40 MG Oral Tablet; TAKE 1 TABLET TWICE DAILY; Therapy: 49INW8380 to (Mary Ann Meth)  Requested for: 46RTW6956; Last Rx:34Mhv2689 Ordered   6  Ipratropium-Albuterol 0 5-2 5 (3) MG/3ML Inhalation Solution; USE 1 UNIT DOSE IN NEBULIZER BID-QID, CAN USE Q 4 HRS PRN; Therapy: 21EID5204 to (Last Rx:03Xpr2836)  Requested for: 30QMB6339 Ordered   7  Iron 325 (65 Fe) MG Oral Tablet; TAKE 1 TABLET DAILY AS DIRECTED; Last Rx:71Eka7142 Ordered   8  Klor-Con M20 20 MEQ Oral Tablet Extended Release; take 2 tablets daily; Therapy: 83AZW0169 to ([id number])  Requested for: 94RCT9339; Last Rx:94Kgw3992 Ordered   9  MetFORMIN HCl  MG Oral Tablet Extended Release 24 Hour; 1 PO BID; Therapy: 08GQY3002 to (Last Rx:40Sgq0105)  Requested for: 61Sua2330 Ordered   10  Metoprolol Succinate ER 25 MG Oral Tablet Extended Release 24 Hour; take 1 tablet by  mouth every day;   Therapy: 91RJL4058 to (Evaluate:71Msp8145)  Requested for: 79IIZ0420; Last  Rx:66Txz2039 Ordered   11  Omeprazole 20 MG Oral Capsule Delayed Release; TAKE 1 CAPSULE EVERY DAY; Therapy: 47CHZ9407 to (Moe Brick)  Requested for: 70NKF8814; Last  Rx:82Ild5534 Ordered   12  Simvastatin 20 MG Oral Tablet; TAKE 1 TABLET DAILY; Therapy: 05EEA2361 to (Evaluate:19Mar2018)  Requested for: 08QSB5276; Last  Rx:24Mar2017 Ordered   13  Slow-Mag 71 5-119 MG Oral Tablet Delayed Release; 1 PO QD; Therapy: 60EAV3224 to (Last Rx:28Kxn3541)  Requested for: 95Fks5673 Ordered   14  Tamsulosin HCl - 0 4 MG Oral Capsule; take 1 capsule by mouth daily; Therapy: 39NUS2640 to (Vernestine Olga)  Requested for: 65SXZ8566; Last  Rx:27Nov2017 Ordered   15  Xarelto 15 MG Oral Tablet; take 1 tablet by mouth every day; Therapy: 54SYX7247 to (Evaluate:11Nov2017)  Requested for: 60Oma9541; Last  Rx:65Vjc1638 Ordered    Allergies    1  Keflex CAPS    Physical Exam   Constitutional  General appearance: Appears healthy and well developed  Lymphatic  No visible disturbance  Musculoskeletal  Digits and nails: No clubbing, cyanosis or edema  Cutaneous and nail exam normal    Neuro/Psych  Alert and oriented x 3  Displays comfort and cooperation during encounterl  Affect is normal    Finding Dermatitis and erosions much improved but still present  Assessment    1  Venous stasis dermatitis of both lower extremities (454 1) (I87 2)    Plan  Venous stasis dermatitis of both lower extremities    · Follow-up visit in 1 week Evaluation and Treatment  Follow-up  Status: Hold For -Scheduling  Requested for: 77QMY5003   · Wound care as instructed ; Status:Complete;   Done: 07ZNW4258    Discussion/Summary    Improvement noted hopefully this will completely heal over the next week or so Unna boot and Coban dressing again applied along with with a DuoDerm follow-up in 1 week        Signatures   Electronically signed by : ARCHIE Taylor ; Dec  7 2017  4:24PM EST                       (Author)

## 2017-12-14 NOTE — PROCEDURES
Chief Complaint  wound care      History of Present Illness  HPI- Derm: 81-year-old male presents for follow-up for previously noted stasis ulcerations and stasis dermatitis patient complaining of pain in the area      Current Meds   1  Advair Diskus 250-50 MCG/DOSE Inhalation Aerosol Powder Breath Activated; TAKE 1 PUFF EVERY 12 HOURS AS NEEDED; Therapy: 86YHX4654 to (Lisa Richards)  Requested for: 18Ios3587; Last Rx:34Ncs9501 Ordered   2  Alendronate Sodium 70 MG Oral Tablet; take 1 tablet by mouth every week; Therapy: 83SBD2350 to (Duarte Sy)  Requested for: 28ROZ3911; Last Rx:75Ili4654 Ordered   3  FreeStyle Lancets Miscellaneous; TEST 1 QD; Therapy: 31JCV8290 to (Last Rx:15Jan2016)  Requested for: 65IKO7960 Ordered   4  FreeStyle Lite Test In [employer]; test twice daily; Therapy: 89NFS1379 to (Evaluate:10Nov2016)  Requested for: 82KWU7668; Last Rx:15Jan2016 Ordered   5  Furosemide 40 MG Oral Tablet; TAKE 1 TABLET TWICE DAILY; Therapy: 57OTB2241 to ([id number])  Requested for: 38TRP0998; Last Rx:79Zvc4992 Ordered   6  Ipratropium-Albuterol 0 5-2 5 (3) MG/3ML Inhalation Solution; USE 1 UNIT DOSE IN NEBULIZER BID-QID, CAN USE Q 4 HRS PRN; Therapy: 79MTU3945 to (Last Rx:22Ywb7488)  Requested for: 88KUN7477 Ordered   7  Iron 325 (65 Fe) MG Oral Tablet; TAKE 1 TABLET DAILY AS DIRECTED; Last Rx:92Lwf0323 Ordered   8  Klor-Con M20 20 MEQ Oral Tablet Extended Release; take 2 tablets daily; Therapy: 30FJS3839 to ()  Requested for: 52MDW6571; Last Rx:21Wvz4473 Ordered   9  MetFORMIN HCl  MG Oral Tablet Extended Release 24 Hour; 1 PO BID; Therapy: 59PBE7302 to (Last Rx:26Peq5833)  Requested for: 26Zsy3682 Ordered   10  Metoprolol Succinate ER 25 MG Oral Tablet Extended Release 24 Hour; take 1 tablet by  mouth every day; Therapy: 81UWU1291 to (Evaluate:70Pxz0495)  Requested for: 20Aug2017; Last  Rx:20Aug2017 Ordered   11   Omeprazole 20 MG Oral Capsule Delayed Release; TAKE 1 CAPSULE EVERY DAY; Therapy: 47QUI9412 to (Tova Magallanes)  Requested for: 48FFB7223; Last  Rx:54Cvu9760 Ordered   12  Simvastatin 20 MG Oral Tablet; TAKE 1 TABLET DAILY; Therapy: 44XUG8115 to (Evaluate:19Mar2018)  Requested for: 03OBU4983; Last  Rx:24Mar2017 Ordered   13  Slow-Mag 71 5-119 MG Oral Tablet Delayed Release; 1 PO QD; Therapy: 29EXW6287 to (Last Rx:06Apr2017)  Requested for: 19Spx6693 Ordered   14  Tamsulosin HCl - 0 4 MG Oral Capsule; take 1 capsule by mouth daily; Therapy: 45BUE2693 to (Marine Cypher)  Requested for: 48BED3504; Last  Rx:27Nov2017 Ordered   15  Xarelto 15 MG Oral Tablet; take 1 tablet by mouth every day; Therapy: 02SWB7411 to (Evaluate:11Nov2017)  Requested for: 57Guw7296; Last  Rx:84Gkz0627 Ordered    Allergies    1  Keflex CAPS    Physical Exam   Constitutional  General appearance: Appears healthy and well developed  Lymphatic  No visible disturbance  Musculoskeletal  Digits and nails: No clubbing, cyanosis or edema  Cutaneous and nail exam normal    Neuro/Psych  Alert and oriented x 3  Displays comfort and cooperation during encounterl  Affect is normal    Finding Previous ulceration on the leg has healed completely however a new ulceration has developed at the tip of the DuoDerm where it was digging into the instep of his foot  Assessment    1  Venous stasis dermatitis of both lower extremities (454 1) (I87 2)   2   Ulcer of left lower extremity with fat layer exposed (707 10) (R79 131)    Plan  Ulcer of left lower extremity with fat layer exposed    · Follow-up visit in 1 week Evaluation and Treatment  Follow-up  Status: Hold For -Scheduling  Requested for: 17HRE8260   · Wound care as instructed ; Status:Complete;   Done: 88HFN4311 11:10AM    Discussion/Summary    At present primary else lesion has healed however patient probably had too much pressure on the tip of the DuoDerm which caused new ulceration advised him not to wear shoes tightly in the area and if there is any pain or discomfort to let us know so that we can check the site do a derm Unna boot and Coban dressing applied to the new ulcerations and will plan follow-up again in 1 week        Signatures   Electronically signed by : ARCHIE Carmichael ; Dec 13 2017 11:11AM EST                       (Author)

## 2017-12-21 NOTE — PROCEDURES
Chief Complaint   wound care      History of Present Illness   HPI- Derm: 50-year-old male presents for follow-up for previously noted stasis ulcerations and stasis dermatitis patient complaining of less pain this visit      Current Meds    1  Advair Diskus 250-50 MCG/DOSE Inhalation Aerosol Powder Breath Activated; TAKE 1     PUFF EVERY 12 HOURS AS NEEDED; Therapy: 43ASI5637 to (Marlon Nearing)  Requested for: 25Edr5963; Last     Rx:45Fyb4787 Ordered   2  Alendronate Sodium 70 MG Oral Tablet; take 1 tablet by mouth every week; Therapy: 29OSI0637 to (Danna Eriberto)  Requested for: 77PYX4584; Last     Rx:73Hun8947 Ordered   3  FreeStyle Lancets Miscellaneous; TEST 1 QD; Therapy: 64EZA4156 to (Last Rx:15Jan2016)  Requested for: 41PIP8633 Ordered   4  FreeStyle Lite Test In [employer]; test twice daily; Therapy: 44LOH3317 to (Evaluate:10Nov2016)  Requested for: 44FZR3661; Last     Rx:15Jan2016 Ordered   5  Furosemide 40 MG Oral Tablet; TAKE 1 TABLET TWICE DAILY; Therapy: 52DAE0190 to ([phone])  Requested for: 66JYV9646; Last     Rx:31Set0569 Ordered   6  Ipratropium-Albuterol 0 5-2 5 (3) MG/3ML Inhalation Solution; USE 1 UNIT DOSE IN     NEBULIZER BID-QID, CAN USE Q 4 HRS PRN; Therapy: 80OLS6820 to (Last Rx:03Vlr0326)  Requested for: 56UYK2304 Ordered   7  Iron 325 (65 Fe) MG Oral Tablet; TAKE 1 TABLET DAILY AS DIRECTED; Last     Rx:93Eog9233 Ordered   8  Klor-Con M20 20 MEQ Oral Tablet Extended Release; take 2 tablets daily; Therapy: 46QCE5002 to (03 17 74 30 53)  Requested for: 67NMR7109; Last     Rx:42Umm9657 Ordered   9  MetFORMIN HCl  MG Oral Tablet Extended Release 24 Hour; 1 PO BID; Therapy: 55LMM5813 to (Last Rx:82Uls3585)  Requested for: 92Wgr4344 Ordered   10  Metoprolol Succinate ER 25 MG Oral Tablet Extended Release 24 Hour; take 1 tablet by      mouth every day;       Therapy: 39WVP0642 to (Evaluate:59Flb6245)  Requested for: 04Glu1004; Last Rx: 17Ssc0148 Ordered   11  Omeprazole 20 MG Oral Capsule Delayed Release; TAKE 1 CAPSULE EVERY DAY; Therapy: 53FSU8450 to (Ct H[place]go)  Requested for: 10ZLH9888; Last      Rx:31Tkm9390 Ordered   12  Simvastatin 20 MG Oral Tablet; TAKE 1 TABLET DAILY; Therapy: 26QYE9623 to (Evaluate:19Mar2018)  Requested for: 97DZC5375; Last      Rx:24Mar2017 Ordered   13  Slow-Mag 71 5-119 MG Oral Tablet Delayed Release; 1 PO QD; Therapy: 18DRA2750 to (Last Rx:83Pjc4532)  Requested for: 84Rnt2442 Ordered   14  Tamsulosin HCl - 0 4 MG Oral Capsule; take 1 capsule by mouth daily; Therapy: 49OWV2189 to (Fitz Amin)  Requested for: 59PQJ9395; Last      Rx:27Nov2017 Ordered   15  Xarelto 15 MG Oral Tablet; take 1 tablet by mouth every day; Therapy: 44JTO3373 to (Evaluate:11Nov2017)  Requested for: 64Sqq9035; Last      Rx:16Tra5987 Ordered    Allergies   1  Keflex CAPS    Physical Exam        Constitutional      General appearance: Appears healthy and well developed  Lymphatic      No visible disturbance  Musculoskeletal      Digits and nails: No clubbing, cyanosis or edema  Cutaneous and nail exam normal        Neuro/Psych      Alert and oriented x 3  Displays comfort and cooperation during encounterl  Affect is normal        Finding Ulceration that was new on the instep still present but appears to be healing with some granulation tissue  Assessment   1  Ulcer of left lower extremity with fat layer exposed (707 10) (A81 116)    Plan   Ulcer of left lower extremity with fat layer exposed    · Follow-up visit in 1 week Evaluation and Treatment  Follow-up  Status: Hold For -    Scheduling  Requested for: 92Jxs6328   · Wound care as instructed ; Status:Complete;   Done: 54MHM7742 11:27AM    Discussion/Summary      Ulceration improving again applied a DuoDerm Unna boot and Coban dressing   Patient again advised to call to let us know if any discomfort or pain occurs follow-up in 1 week        Signatures    Electronically signed by : ARCHIE Hernadez ; Dec 20 2017 11:28AM EST                       (Author)

## 2017-12-29 NOTE — PROCEDURES
Chief Complaint   unna boot      History of Present Illness   HPI- Derm: 71-year-old male presents for follow-up for previously noted stasis ulcerations and stasis dermatitis patient complaining of less pain this visit      Current Meds    1  Advair Diskus 250-50 MCG/DOSE Inhalation Aerosol Powder Breath Activated; TAKE 1     PUFF EVERY 12 HOURS AS NEEDED; Therapy: 59BSM9783 to (Kathaleen Bernheim)  Requested for: 03Nqd6802; Last     Rx:53Pnm3524 Ordered   2  Alendronate Sodium 70 MG Oral Tablet; take 1 tablet by mouth every week; Therapy: 21PJQ0183 to (Francesco Marina)  Requested for: 69KJB8439; Last     Rx:70Smq7155 Ordered   3  FreeStyle Lancets Miscellaneous; TEST 1 QD; Therapy: 58SSW8426 to (Last Rx:15Jan2016)  Requested for: 71XQG2941 Ordered   4  FreeStyle Lite Test In [employer]; test twice daily; Therapy: 69FRF9046 to (Evaluate:10Nov2016)  Requested for: 06WAU2394; Last     Rx:15Jan2016 Ordered   5  Furosemide 40 MG Oral Tablet; TAKE 1 TABLET TWICE DAILY; Therapy: 40SON6871 to ([phone])  Requested for: 29QEZ6331; Last     Rx:71Vpw2671 Ordered   6  Ipratropium-Albuterol 0 5-2 5 (3) MG/3ML Inhalation Solution; USE 1 UNIT DOSE IN     NEBULIZER BID-QID, CAN USE Q 4 HRS PRN; Therapy: 54FVG8725 to (Last Rx:63Fus8367)  Requested for: 36PRS3305 Ordered   7  Iron 325 (65 Fe) MG Oral Tablet; TAKE 1 TABLET DAILY AS DIRECTED; Last     Rx:39Mwo4988 Ordered   8  Klor-Con M20 20 MEQ Oral Tablet Extended Release; take 2 tablets daily; Therapy: 98QWY5094 to ([phone])  Requested for: 10ZHY7750; Last     Rx:17Fgr0169 Ordered   9  MetFORMIN HCl  MG Oral Tablet Extended Release 24 Hour; 1 PO BID; Therapy: 34VCK3521 to (Last Rx:54Epn1399)  Requested for: 57Ewz6308 Ordered   10  Metoprolol Succinate ER 25 MG Oral Tablet Extended Release 24 Hour; take 1 tablet by      mouth every day;       Therapy: 98QCL0814 to (Evaluate:60Gbi5290)  Requested for: 18Xhi2262; Last Rx: 54Wvt3257 Ordered   11  Omeprazole 20 MG Oral Capsule Delayed Release; TAKE 1 CAPSULE EVERY DAY; Therapy: 59GJT7065 to (Corby Ventura)  Requested for: 20CVT7946; Last      Rx:78Wct2411 Ordered   12  Simvastatin 20 MG Oral Tablet; TAKE 1 TABLET DAILY; Therapy: 58ZYG1498 to (Evaluate:19Mar2018)  Requested for: 85THM8770; Last      Rx:24Mar2017 Ordered   13  Slow-Mag 71 5-119 MG Oral Tablet Delayed Release; 1 PO QD; Therapy: 50CQT8692 to (Last Rx:06Apr2017)  Requested for: 06Apr2017 Ordered   14  Tamsulosin HCl - 0 4 MG Oral Capsule; take 1 capsule by mouth daily; Therapy: 89FRF8353 to (Luz Elena Yung)  Requested for: 75BVI7510; Last      Rx:27Nov2017 Ordered   15  Xarelto 15 MG Oral Tablet; take 1 tablet by mouth every day; Therapy: 10LHL3749 to (Evaluate:11Nov2017)  Requested for: 35Eks9662; Last      Rx:02Yqn8514 Ordered    Allergies   1  Keflex CAPS    Physical Exam        Constitutional      General appearance: Appears healthy and well developed  Lymphatic      No visible disturbance  Musculoskeletal      Digits and nails: No clubbing, cyanosis or edema  Cutaneous and nail exam normal        Neuro/Psych      Alert and oriented x 3  Displays comfort and cooperation during encounterl  Affect is normal        Finding Ulceration that was new on the instep still present but appears to be healing probably 50% smaller than its initial appearance  Assessment   1  Ulcer of left lower extremity with fat layer exposed (707 10) (F06 719)    Plan   Ulcer of left lower extremity with fat layer exposed    · Follow-up visit in 1 week Evaluation and Treatment  Follow-up  Status: Hold For -    Scheduling  Requested for: 76Uqz0295   · Wound care as instructed ; Status:Complete;   Done: 33NYY9331 01:47PM    Discussion/Summary      Ulceration improving again applied a DuoDerm Unna boot and Coban dressing   Patient again advised to call to let us know if any discomfort or pain occurs follow-up in 1 week        Signatures    Electronically signed by : ARCHIE Conner ; Dec 28 2017  1:48PM EST                       (Author)

## 2018-01-05 ENCOUNTER — GENERIC CONVERSION - ENCOUNTER (OUTPATIENT)
Dept: OTHER | Facility: OTHER | Age: 83
End: 2018-01-05

## 2018-01-05 ENCOUNTER — ALLSCRIPTS OFFICE VISIT (OUTPATIENT)
Dept: OTHER | Facility: OTHER | Age: 83
End: 2018-01-05

## 2018-01-06 NOTE — PROGRESS NOTES
Assessment   1  Pneumonia (486) (J18 9)   2  Osteoporosis (733 00) (M81 0)   3  Pulmonary nodules (793 19) (R91 8)   4  DMII (diabetes mellitus, type 2) (250 00) (E11 9)   5  Hypertension (401 9) (I10)   6  Chronic obstructive pulmonary disease (496) (J44 9)    Plan   DMII (diabetes mellitus, type 2)    · From  MetFORMIN HCl  MG Oral Tablet Extended Release 24 Hour 1 PO BID To    MetFORMIN HCl  MG Oral Tablet Extended Release 24 Hour (Glucophage XR) 1 PO QD  Health Maintenance    · Breo Ellipta 100-25 MCG/INH Inhalation Aerosol Powder Breath Activated; USE 1    INHALATION ONCE DAILY  Osteoporosis    · * DXA BONE DENSITY SPINE HIP AND PELVIS; Status:Hold For - Scheduling; Requested    for:09Feb2018;   Pulmonary nodules    · * CT CHEST WO CONTRAST; Status:Hold For - Scheduling; Requested for:22Jan2018; Discussion/Summary   Discussion Summary:    COPD appears clinically stable- Advair was changed to [facility], will continue on 1 or the other based upon cost, let me know to clarify at your follow-up visit    2 diabetes mellitus has been under excellent control on metformin  b i d , he was changed to once daily which is what he is doing now, continue once daily and check A1c prior to our follow-up    is stable on present regimen   has been on prolonged treatment with alendronate 70 mg per week  Medication list upon rehab discharge says 10 mg per week  His GFR is estimated at 68% on December 3rd  No indication for dose reduction  I have ordered DEXA scan for after February 9th to be compared to 2016 study to consider drug holiday   nodules -infectious versus other, follow-up chest CT accordingly next month  Medication SE Review and Pt Understands Tx: Possible side effects of new medications were reviewed with the patient/guardian today  The treatment plan was reviewed with the patient/guardian   The patient/guardian understands and agrees with the treatment plan      Chief Complaint   Chief Complaint Free Text Note Form: Hospital f/u      History of Present Illness   HPI: Hospital follow-up after a fall with rhabdomyolysis found to have pneumonia and nodularity on chest CT, treated with doxycycline  He completed 3 weeks of rehab and was released last week  He is ambulatory with a cane, his stride and stamina have improved  He has medical Alert landed on his person  No acute complaints today  He is present with his daughter, Matilda Gerard and there are many questions about medications at the been changed during the rehab  Review of Systems   Complete-Male:      Constitutional: No fever or chills, feels well, no tiredness, no recent weight gain or weight loss  Eyes: No complaints of eye pain, no red eyes, no discharge from eyes, no itchy eyes  Cardiovascular: No complaints of slow heart rate, no fast heart rate, no chest pain, no palpitations, no leg claudication, no lower extremity  Respiratory: No complaints of shortness of breath, no wheezing, no cough, no SOB on exertion, no orthopnea or PND  Gastrointestinal: No complaints of abdominal pain, no constipation, no nausea or vomiting, no diarrhea or bloody stools  Genitourinary: as noted in HPI  Musculoskeletal: No complaints of arthralgia, no myalgias, no joint swelling or stiffness, no limb pain or swelling  Integumentary: No complaints of skin rash or skin lesions, no itching, no skin wound, no dry skin  Neurological: No compliants of headache, no confusion, no convulsions, no numbness or tingling, no dizziness or fainting, no limb weakness, no difficulty walking  Psychiatric: Is not suicidal, no sleep disturbances, no anxiety or depression, no change in personality, no emotional problems  Endocrine: No complaints of proptosis, no hot flashes, no muscle weakness, no erectile dysfunction, no deepening of the voice, no feelings of weakness        Hematologic/Lymphatic: No complaints of swollen glands, no swollen glands in the neck, does not bleed easily, no easy bruising  Active Problems   1  Active advance directive on file (V49 89) (Z78 9)   2  Acute bronchitis (466 0) (J20 9)   3  Acute pharyngitis (462) (J02 9)   4  Arteriosclerosis of coronary artery (414 00) (I25 10)   5  Atrial fibrillation (427 31) (I48 91)   6  Avulsion, skin (879 8) (T14 8XXA)   7  Benign prostatic hyperplasia (600 00) (N40 0)   8  Changing skin lesion (709 9) (L98 9)   9  Chronic kidney disease (CKD), 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 2) (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 2)   10  Chronic obstructive pulmonary disease (496) (J44 9)   11  Common cold (460) (J00)   12  DMII (diabetes mellitus, type 2) (250 00) (E11 9)   13  Esophageal reflux (530 81) (K21 9)   14  Fall (E888 9) (W19 XXXA)   15  Hyperlipidemia (272 4) (E78 5)   16  Hypertension (401 9) (I10)   17  Hypoglycemia (251 2) (E16 2)   18  Hypomagnesemia (275 2) (E83 42)   19  Iron deficiency anemia (280 9) (D50 9)   20  History of No advance directive on file (V49 89) (Z78 9)   21  Osteoporosis (733 00) (M81 0)   22  Polycythemia (238 4) (D75 1)   23  Screening for depression (V79 0) (Z13 89)   24  Screening for genitourinary condition (V81 6) (Z13 89)   25  Screening for skin condition (V82 0) (Z13 89)   26  Seborrheic keratosis (702 19) (L82 1)   27  Skin neoplasm (239 2) (D49 2)   28  Skin tear of right hand without complication (630 6) (T68 550B)   29  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)   30  Squamous cell cancer of skin of left forearm (173 62) (C44 629)   31  Ulcer of left lower extremity with fat layer exposed (707 10) (L97 922)   32  Venous stasis dermatitis of both lower extremities (454 1) (I87 2)   33  Vitamin D deficiency (268 9) (E55 9)   34  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)    Past Medical History   1  History of Anticoagulant long-term use (V58 61) (Z79 01)   2  History of Congestive heart failure (428 0) (I50 9)   3  History of DEXA Body Composition Study   4   H/O nonmelanoma skin cancer (V10 83) (Z85 828)   5  History of abdominal aortic aneurysm (V12 59) (Z86 79)   6  History of chronic kidney disease (V13 09) (Z87 448)   7  History of edema (V13 89) (Z87 898)   8  History of Morbid or severe obesity due to excess calories (278 01) (E66 01)   9  History of Osteoporosis (733 00) (M81 0)   10  History of Parkinsons disease, secondary (332 1) (G21 9)   11  History of Renal failure (586) (N19)   12  History of Upper Respiratory Tract Disorders (478 9)  Active Problems And Past Medical History Reviewed: The active problems and past medical history were reviewed and updated today  Surgical History   1  History of Coronary Artery Surgery   2  History of PTCA  Surgical History Reviewed: The surgical history was reviewed and updated today  Family History   Mother    1  Family history of Acute Myocardial Infarction (V17 3)  Father    2  Family history of Acute Myocardial Infarction (V17 3)   3  Family history of Emphysema   4  Family history of Hypertension (V17 49)  Family History Reviewed: The family history was reviewed and updated today  Social History    · Active advance directive on file (B19 22) (Z78 9)   · Former smoker (V57 58) (Z87 891)   · Marital History -    · Never Drank Alcohol   · Never Used Drugs   · History of No advance directive on file (V49 89) (Z78 9)   · Non smoker / tobacco user (V49 89) (Z78 9)  Social History Reviewed: The social history was reviewed and updated today  Current Meds    1  Alendronate Sodium 70 MG Oral Tablet; take 1 tablet by mouth every week; Therapy: 93XWQ9693 to ([phone])  Requested for: 45LJH7401; Last Rx:84Zkx3684     Ordered   2  Breo Ellipta 100-25 MCG/INH Inhalation Aerosol Powder Breath Activated; USE 1 INHALATION ONCE     DAILY; Therapy: 00SYF8172 to Recorded   3  FreeStyle Lancets Miscellaneous; TEST 1 QD; Therapy: 37OZH1626 to (Last Rx:32Jhv9051)  Requested for: 59YZI1367 Ordered   4   FreeStyle Lite Test In [employer]; test twice daily; Therapy: 69YNP7182 to (Evaluate:10Nov2016)  Requested for: 59JAA2936; Last Rx:15Jan2016     Ordered   5  Furosemide 40 MG Oral Tablet; TAKE 1 TABLET TWICE DAILY; Therapy: 57PMC1608 to [phone])  Requested for: 33ALD3897; Last Rx:04Iqf3415 Ordered   6  Ipratropium-Albuterol 0 5-2 5 (3) MG/3ML Inhalation Solution; USE 1 UNIT DOSE IN NEBULIZER     BID-QID, CAN USE Q 4 HRS PRN; Therapy: 46FSI1679 to (Last Rx:56Jtd4293)  Requested for: 97ZNO8941 Ordered   7  Iron 325 (65 Fe) MG Oral Tablet; TAKE 1 TABLET DAILY AS DIRECTED; Last Rx:00Wlu9853 Ordered   8  Klor-Con M20 20 MEQ Oral Tablet Extended Release; take 2 tablets daily; Therapy: 31SVB2262 to [phone])  Requested for: 19PQJ5066; Last Rx:04Eio7556     Ordered   9  MetFORMIN HCl  MG Oral Tablet Extended Release 24 Hour (Glucophage XR); 1 PO BID; Therapy: 85YJB2463 to (Last Rx:62Xyf0470)  Requested for: 37Jdx9400 Ordered   10  Metoprolol Succinate ER 25 MG Oral Tablet Extended Release 24 Hour; take 1 tablet by mouth every      day; Therapy: 69DAH4344 to (Evaluate:40Gjc5524)  Requested for: 49Yjg2280; Last Rx:71Tvx8855      Ordered   11  Omeprazole 20 MG Oral Capsule Delayed Release; TAKE 1 CAPSULE EVERY DAY; Therapy: 00LPR2331 to (Michelle Comfort)  Requested for: 49WJD4592; Last Rx:73Kkz8926      Ordered   12  Simvastatin 20 MG Oral Tablet; TAKE 1 TABLET DAILY; Therapy: 51OAZ8544 to (Evaluate:19Mar2018)  Requested for: 41KXD3880; Last Rx:24Mar2017      Ordered   13  Slow-Mag 71 5-119 MG Oral Tablet Delayed Release; 1 PO QD; Therapy: 67MSW9219 to (Last Rx:45Eub2365)  Requested for: 47Hfb9042 Ordered   14  Tamsulosin HCl - 0 4 MG Oral Capsule; take 1 capsule by mouth daily; Therapy: 41HPQ0960 to (Lauren Garrido)  Requested for: 44XNG8653; Last Rx:27Nov2017      Ordered   15  Xarelto 15 MG Oral Tablet; take 1 tablet by mouth every day;       Therapy: 78SYI6449 to (Evaluate:21Yft9889)  Requested for: 76Kkf0335; Last Rx:67Yav2842      Ordered  Medication List Reviewed: The medication list was reviewed and updated today  Allergies   1  Keflex CAPS    Vitals   Vital Signs    Recorded: 17ODO9641 10:20AM   Heart Rate 60   Respiration 14   Systolic 046   Diastolic 70   Height 5 ft 7 in   Weight 170 lb 6 oz   BMI Calculated 26 68   BSA Calculated 1 89     Physical Exam        Constitutional      General appearance: No acute distress, well appearing and well nourished  Ears, Nose, Mouth, and Throat      Oropharynx: Normal with no erythema, edema, exudate or lesions  Pulmonary      Respiratory effort: No increased work of breathing or signs of respiratory distress  Auscultation of lungs: Abnormal  -- basilar rhonchi, prolonged exp phase  Cardiovascular      Auscultation of heart: Abnormal  -- irrregular 1/6 m  Examination of extremities for edema and/or varicosities: Abnormal  -- ace wrap R, unna boot L  Lymphatic      Palpation of lymph nodes in neck: No lymphadenopathy  Psychiatric      Orientation to person, place and time: Normal        Mood and affect: Normal           Results/Data   PHQ-2 Adult Depression Screening 40AMZ5512 10:30AM User, Ahs      Test Name Result Flag Reference   PHQ-2 Adult Depression Score 0     Over the last two weeks, how often have you been bothered by any of the following problems? Little interest or pleasure in doing things: Not at all - 0     Feeling down, depressed, or hopeless: Not at all - 0   PHQ-2 Adult Depression Screening Negative          Health Management   Iron deficiency anemia   COLONOSCOPY; every 10 years; Last 01BPN5040; Next Due: 26Lrw6804; Active  Health Maintenance   Dexa Scan; every 2 years; Next Due: 83Crh1951;  Overdue    Future Appointments      Date/Time Provider Specialty Site   02/06/2018 10:15 AM Alexandr Potter [facility]  [address] 01/12/2018 08:00 AM Photo Sharan, Nurse Schedule   [facility]     Signatures    Electronically signed by : ARCHIE Sheppard ; Jan 5 2018 11:05AM EST                       (Author)

## 2018-01-09 NOTE — CONSULTS
Current Meds   1  Advair Diskus 250-50 MCG/DOSE Inhalation Aerosol Powder Breath Activated; TAKE 1   PUFF EVERY 12 HOURS AS NEEDED; Therapy: 10EGE7447 to (Evaluate:15Mar2017)  Requested for: 52THN6355; Last   Rx:55Aff9736 Ordered   2  Alendronate Sodium 70 MG Oral Tablet; take 1 tablet by mouth every week; Therapy: 14FKC8093 to (Iris Britton)  Requested for: 16IKK1647; Last   Rx:29Cvp7409 Ordered   3  Citracal Maximum 315-250 MG-UNIT Oral Tablet; Therapy: [facility] to Recorded   4  FreeStyle Lancets Miscellaneous; TEST 1 QD; Therapy: 93WYH0760 to (Last Rx:15Jan2016)  Requested for: 85ZHN9073 Ordered   5  FreeStyle Lite Test In [employer]; test twice daily; Therapy: 66OKR6948 to (Evaluate:10Nov2016)  Requested for: 18GBL4858; Last   Rx:15Jan2016 Ordered   6  Furosemide 40 MG Oral Tablet; TAKE 1 TABLET DAILY  Requested for: 04LER7509; Last   Rx:15Oct2016 Ordered   7  Klor-Con M20 20 MEQ Oral Tablet Extended Release; TAKE 1 TABLET DAILY; Therapy: 19EKW0416 to (Evaluate:10Oct2017)  Requested for: 15Oct2016; Last   Rx:15Oct2016 Ordered   8  MetFORMIN HCl  MG Oral Tablet Extended Release 24 Hour (Glucophage XR); 1   PO BID; Therapy: 66PXO5820 to (Last Rx:07Nov2016) Ordered   9  Metoprolol Succinate ER 25 MG Oral Tablet Extended Release 24 Hour; take 1 tablet by   mouth every day; Therapy: 36APP7444 to (Evaluate:08Jun2017)  Requested for: 55BJC7543; Last   Rx:13Jun2016 Ordered   10  Omeprazole 20 MG Oral Capsule Delayed Release; TAKE 1 CAPSULE DAILY     Requested for: 08TFI3989; Last Rx:02Puc6400 Ordered   11  Simvastatin 20 MG Oral Tablet; TAKE 1 TABLET DAILY; Therapy: 47CFS1808 to (Josef Garcia)  Requested for: 53URJ7985; Last    Rx:03Kvx8590 Ordered   12  Slow-Mag 71 5-119 MG Oral Tablet Delayed Release; 1 PO BID; Therapy: 88LBJ1219 to (Last Rx:07Nov2016)  Requested for: 01YEK0419 Ordered   13   Tamsulosin HCl - 0 4 MG Oral Capsule; take 1 capsule by mouth daily; Therapy: 31OSM7788 to (Everett Sarah)  Requested for: 74YIP3664; Last    Rx:10Nov2016 Ordered   14  Xarelto 15 MG Oral Tablet; take 1 tablet by mouth every day; Therapy: 53VHG6567 to ([phone])  Requested for: 00Xet7354; Last    Rx:07Mvm9310 Ordered    Allergies    1  Keflex CAPS    Procedure    Procedure: Abe Ortiz was assessed with a Holter monitor  Atrial flutter/bradycardia The patient complains of no symptoms   Duration: 48 hours  Findings: This Holter monitor and analysis was done for a period of approximately 48 hours  The rhythm is atrial flutter  Average heart rate 61 bpm, minimum heart rate 37 bpm, maximum heart rate 108 bpm  There were multiple PVCs few episodes of ventricular trigeminy and couplets  There were a few pauses mostly during sleeping hours  The longest pause was 2 8 seconds  Patient had no symptoms  Complications:  the quality of the study allowed for adequate interpretation        Signatures   Electronically signed by : ARCHIE Estes ; Nov 30 2016  9:08PM EST                       (Author)

## 2018-01-10 NOTE — PROGRESS NOTES
Assessment    1  Seborrheic keratosis (702 19) (L82 1)   2  Screening for skin condition (V82 0) (Z13 89)   3  H/O nonmelanoma skin cancer (V10 83) (B52 685)    Plan    · *VB-Foot Exam; Status:Active; Requested ZOK:82FJT8131;    · *VB-Foot Exam; Status:Complete; Requested for:Recurring Schedule: 1/20/2016 ;     · *VB-Urinary Incontinence Screen (Dx V81 6 Screen for UI); Status:Active; Requested  XPU:18WYE2390;    · *VB-Urinary Incontinence Screen (Dx V81 6 Screen for UI); Status:Complete; Requested  for:Recurring Schedule: 1/20/2016 ;     · Use a sun block product with an SPF of 15 or more ; Status:Complete;   Done:  63ECR9697   · Follow-up visit in 6 months Evaluation and Treatment  Follow-up  Status: Complete   Done: 23UJK3905    Discussion/Summary  Discussion Summary- [facility]:   Assessment #1: History skin cancer  Care Plan:   No recurrence nothing else atypical sunblock recommended followup in 6 months  Assessment #2: Seborrheic keratosis  Care Plan:   Patient reassured these are normal growths we acquire with age no treatment needed  Assessment #3: Screening for dermatologic disorders  Care Plan:   Nothing else noted on complete exam followup in 6 months  Chief Complaint  Chief Complaint Free Text Note Form: 6 MONTH FULL BODY SKIN CANCER EXAM      History of Present Illness  HPI: 71-year-old male with previous history of skin cancer presents for overall skin check no specific complaints noted      Review of Systems  Complete Male Dermatology [facility] Patient:   Constitutional: Denies constitutional symptoms  Eyes: Denies eye symptoms  ENT:  denies ear symptoms, nasal symptoms, mouth or throat symptoms  Cardiovascular: Denies cardiovascular symptoms  Respiratory: Denies respiratory symptoms  Gastrointestinal: Denies gastrointestinal symptoms  Musculoskeletal: Denies musculoskeletal symptoms  Integumentary: Denies skin, hair and nail symptoms     Neurological: Denies neurologic symptoms  Psychiatric: Denies psychiatric symptoms  Endocrine: Denies endocrine symptoms  Hematologic/Lymphatic: Denies hematologic symptoms  Active Problems    1  Acute bronchitis (466 0) (J20 9)   2  Acute pharyngitis (462) (J02 9)   3  Arteriosclerosis of coronary artery (414 00) (I25 10)   4  Atrial fibrillation (427 31) (I48 91)   5  Benign prostatic hyperplasia (600 00) (N40 0)   6  Changing skin lesion (709 9) (L98 9)   7  Chronic kidney disease (CKD), 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 2) (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 2)   8  Chronic obstructive pulmonary disease (496) (J44 9)   9  Common cold (460) (J00)   10  DMII (diabetes mellitus, type 2) (250 00) (E11 9)   11  Esophageal reflux (530 81) (K21 9)   12  Hyperlipidemia (272 4) (E78 5)   13  Hypertension (401 9) (I10)   14  Iron deficiency anemia (280 9) (D50 9)   15  Osteoporosis (733 00) (M81 0)   16  Polycythemia (238 4) (D75 1)   17  Screening for skin condition (V82 0) (Z13 89)   18  Seborrheic keratosis (702 19) (L82 1)   19  Skin neoplasm (239 2) (D49 2)   20  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)   21  Squamous cell cancer of skin of left forearm (173 62) (C44 629)   22  Venous stasis dermatitis of both lower extremities (454 1) (I83 11,I83 12)   23  Vitamin D deficiency (268 9) (E55 9)    Past Medical History    1  Anticoagulant long-term use (V58 61) (Z79 01)   2  History of Congestive heart failure (428 0) (I50 9)   3  History of DEXA Body Composition Study   4  H/O nonmelanoma skin cancer (V10 83) (Z85 828)   5  History of abdominal aortic aneurysm (V12 59) (Z86 79)   6  History of chronic kidney disease (V13 09) (Z87 448)   7  History of edema (V13 89) (Z87 898)   8  History of Morbid or severe obesity due to excess calories (278 01) (E66 01)   9  History of Osteoporosis (733 00) (M81 0)   10  History of Parkinsons disease, secondary (332 1) (G21 9)   11  History of Pneumonia (486) (J18 9)   12  History of Renal failure (586) (N19)   13  History of Upper Respiratory Tract Disorders (478 9)  Past Medical History Reviewed- Derm:   The past medical history was reviewed  Surgical History    1  History of Coronary Artery Surgery   2  History of PTCA  Surgical History Reviewed [facility]:   Surgical History reviewed      Family History    1  Family history of Acute Myocardial Infarction (V17 3)    2  Family history of Acute Myocardial Infarction (V17 3)   3  Family history of Emphysema   4  Family history of Hypertension (V17 49)  Family History Reviewed- Derm:   Family History was reviewed      Social History    · Former smoker (V15 82) (R60 685)   · Marital History -    · Never Drank Alcohol   · Never Used Drugs  Social History Reviewed [facility]: The social history was reviewed      Current Meds   1  Advair Diskus 250-50 MCG/DOSE Inhalation Aerosol Powder Breath Activated; TAKE 1   PUFF EVERY 12 HOURS AS NEEDED; Therapy: 97HCR9937 to (Lavinia Johnson)  Requested for: 94TPO5666; Last   Rx:06Nov2015 Ordered   2  Alendronate Sodium 70 MG Oral Tablet; take 1 tablet by mouth every week; Therapy: 05VGV1343 to (Evaluate:09Jan2017)  Requested for: 75ZVH6235; Last   Rx:15Jan2016 Ordered   3  Ammonium Lactate 12 % External Lotion; APPLY  AND RUB  IN A THIN FILM TO   AFFECTED AREAS TWICE DAILY  (AM AND PM); Therapy: 81HRG5264 to (Last Rx:16Oct2015)  Requested for: 16Oct2015 Ordered   4  Citracal Maximum 315-250 MG-UNIT Oral Tablet; Therapy: (Piper Pratt) to Recorded   5  Digoxin 125 MCG Oral Tablet; take 1 tablet Qd; Therapy: 02SIZ7780 to (Willian Dumas)  Requested for: 44WTZ7881; Last   CC:79ETG2604 Ordered   6  Diltiazem HCl ER Coated Beads 120 MG Oral Capsule Extended Release 24 Hour; take   1 capsule daily; Therapy: 48TMO0434 to (General Dad)  Requested for: 62FJT5005; Last   Rx:22Yzd0546 Ordered   7  FreeStyle Lancets Miscellaneous; TEST 1 QD;    Therapy: 16RIX3309 to (Last Rx:15Jan2016)  Requested for: 56FGR8215 Ordered   8  FreeStyle Lite Test In [employer]; test twice daily; Therapy: 60LEA3191 to (Evaluate:10Nov2016)  Requested for: 77OYB0140; Last   Rx:15Jan2016 Ordered   9  Furosemide 40 MG Oral Tablet; TAKE 1 TABLET TWICE DAILY  Requested for:   09YGG1794; Last Rx:08Apr2015 Ordered   10  Glimepiride 4 MG Oral Tablet; start with 1 po q am x 1 wk, then increase  to twice daily    if sugar >175; Therapy: 40MUD2257 to (Evaluate:17Wmu6255)  Requested for: 44NMA9914; Last    Rx:15Jan2016 Ordered   11  Klor-Con M20 20 MEQ Oral Tablet Extended Release; TAKE 2 TABLETS DAILY; Therapy: 59KKH7096 to (Evaluate:30Apr2016)  Requested for: 66UFE0161; Last    BM:70TTM9674 Ordered   12  MetFORMIN HCl  MG Oral Tablet Extended Release 24 Hour; start 1 po daily w/    dinner, increase 500 mg each week as tolerated until taking 4 tabs; Therapy: 99DRJ2533 to (Last Rx:15Jan2016)  Requested for: 62TZN7048 Ordered   13  Metoprolol Succinate ER 25 MG Oral Tablet Extended Release 24 Hour; take 1 tablet by    mouth every day; Therapy: 73VVX4823 to (Evaluate:11Aug2016)  Requested for: 82AXP0481; Last    Rx:17Aug2015 Ordered   14  Omeprazole 20 MG Oral Capsule Delayed Release; TAKE 1 CAPSULE DAILY; Last    Rx:08Apr2015 Ordered   15  Prevnar 13 Intramuscular Suspension; Inject one dose; Therapy: 16AHJ2500 to (Last Rx:08Apr2015)  Requested for: 08Apr2015 Ordered   16  Simvastatin 40 MG Oral Tablet; take 1 tablet by mouth every day; Therapy: 00PUC5528 to (Josh Greenview)  Requested for: 56IAX6454; Last    Rx:19Piv6276 Ordered   17  Tamsulosin HCl - 0 4 MG Oral Capsule; take 1 capsule by mouth daily; Therapy: 81XXE1936 to (Josh Greenview)  Requested for: 77VBW5984; Last    Rx:96Sza4501 Ordered   18  Xarelto 15 MG Oral Tablet; take 1 tablet by mouth every day; Therapy: 55SKC5169 to (Kyleigh Duron)  Requested for: 92Qsv1038; Last    Rx:93Zdp1191 Ordered  Medication List Reviewed:    The medication list was reviewed and updated today  Allergies    1  Keflex CAPS    Physical Exam    Constitutional   General appearance: Appears healthy and well developed  Lymphatic   No visible disturbance  Musculoskeletal   Digits and nails: No clubbing, cyanosis or edema  Cutaneous and nail exam normal     Skin   Scalp skin texture and hair distribution: Normal skin texture on scalp, normal hair distribution  Head: Normal turgor, no rashes, no lesions  Neck: Normal turgor, no rashes, no lesions  Chest: Normal turgor, no rashes, no lesions  Abdomen: Normal turgor, no rashes, no lesions  Back: Normal turgor, no rashes, no lesions  Right upper extremity: Normal turgor, no rashes, no lesions  Left upper extremity: Normal turgor, no rashes, no lesions  Neuro/Psych   Alert and oriented x 3  Displays comfort and cooperation during encounterl  Affect is normal     Finding Previous sites of skin cancer well-healed without recurrence normal keratotic papules with greasy stuck on appearance nothing else atypical noted on exam       Health Management  Iron deficiency anemia   COLONOSCOPY; every 10 years; Last 29EBM7650; Next Due: 07Dpa3860; Active  Health Maintenance   Dexa Scan; every 2 years; Next Due: 53Mjn3289; Overdue    Future Appointments    Date/Time Provider Specialty Site   02/01/2016 01:15 PM ARCHIE Mora  Internal Medicine [place] [address] [address]   07/18/2016 09:50 AM ARCHIE Miller   Dermatology [address]     Signatures   Electronically signed by : ARCHIE Pringle ; Jan 25 2016 10:54AM EST                       (Author)

## 2018-01-12 VITALS
BODY MASS INDEX: 29.19 KG/M2 | SYSTOLIC BLOOD PRESSURE: 140 MMHG | DIASTOLIC BLOOD PRESSURE: 86 MMHG | HEART RATE: 60 BPM | RESPIRATION RATE: 14 BRPM | WEIGHT: 186 LBS | HEIGHT: 67 IN

## 2018-01-13 VITALS
HEIGHT: 67 IN | HEART RATE: 56 BPM | BODY MASS INDEX: 29.08 KG/M2 | RESPIRATION RATE: 14 BRPM | WEIGHT: 185.25 LBS | DIASTOLIC BLOOD PRESSURE: 80 MMHG | SYSTOLIC BLOOD PRESSURE: 136 MMHG

## 2018-01-13 NOTE — MISCELLANEOUS
Dear Joesph Steele,      We are writing to you because we have tried contacting you several times  to set up an appointment with Dr Adrien Kanner  It is important for you to schedule this appointment so that   Dr Adrien Kanner can better assess your health  Please give our office a call at [phone]  Thank you!

## 2018-01-14 VITALS
DIASTOLIC BLOOD PRESSURE: 52 MMHG | TEMPERATURE: 98.2 F | OXYGEN SATURATION: 96 % | HEIGHT: 67 IN | WEIGHT: 175.38 LBS | SYSTOLIC BLOOD PRESSURE: 118 MMHG | BODY MASS INDEX: 27.53 KG/M2 | HEART RATE: 53 BPM

## 2018-01-16 NOTE — PROGRESS NOTES
Chief Complaint  area cleansed with saline and bioclusive dressing applied      Active Problems    1  Acute bronchitis (466 0) (J20 9)   2  Acute pharyngitis (462) (J02 9)   3  Arteriosclerosis of coronary artery (414 00) (I25 10)   4  Atrial fibrillation (427 31) (I48 91)   5  Avulsion, skin (879 8) (T14 8)   6  Benign prostatic hyperplasia (600 00) (N40 0)   7  Changing skin lesion (709 9) (L98 9)   8  Chronic kidney disease (CKD), 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 2) (N18 2)   9  Chronic obstructive pulmonary disease (496) (J44 9)   10  Common cold (460) (J00)   11  DMII (diabetes mellitus, type 2) (250 00) (E11 9)   12  Esophageal reflux (530 81) (K21 9)   13  Fall (E888 9) (W19 XXXA)   14  Hyperlipidemia (272 4) (E78 5)   15  Hypertension (401 9) (I10)   16  Hypoglycemia (251 2) (E16 2)   17  Hypomagnesemia (275 2) (E83 42)   18  Iron deficiency anemia (280 9) (D50 9)   19  Osteoporosis (733 00) (M81 0)   20  Polycythemia (238 4) (D75 1)   21  Screening for depression (V79 0) (Z13 89)   22  Screening for genitourinary condition (V81 6) (Z13 89)   23  Screening for skin condition (V82 0) (Z13 89)   24  Seborrheic keratosis (702 19) (L82 1)   25  Skin neoplasm (239 2) (D49 2)   26  Skin tear of right hand without complication (086 0) (C15 485C)   27  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)   28  Squamous cell cancer of skin of left forearm (173 62) (C44 629)   29  Venous stasis dermatitis of both lower extremities (454 1) (I87 2)   30  Vitamin D deficiency (268 9) (E55 9)   31  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)    Current Meds   1  Advair Diskus 250-50 MCG/DOSE Inhalation Aerosol Powder Breath Activated; TAKE 1   PUFF EVERY 12 HOURS AS NEEDED; Therapy: 15ANI4050 to (Evaluate:15Mar2017)  Requested for: 17GGI9424; Last   Rx:71Xjp7962 Ordered   2  Alendronate Sodium 70 MG Oral Tablet; take 1 tablet by mouth every week;    Therapy: 77WQM4742 to (Sunburst Noss)  Requested for: 53VIE4597; Last   Rx:07Jqz5729 Ordered   3  FreeStyle Lancets Miscellaneous; TEST 1 QD; Therapy: 91AUZ7153 to (Last Rx:15Jan2016)  Requested for: 78ZYU2903 Ordered   4  FreeStyle Lite Test In [employer]; test twice daily; Therapy: 56MUT0772 to (Evaluate:10Nov2016)  Requested for: 58EAD0571; Last   Rx:15Jan2016 Ordered   5  Furosemide 40 MG Oral Tablet; TAKE 1 TABLET DAILY  Requested for: 67REJ7023; Last   Rx:15Oct2016 Ordered   6  Klor-Con M20 20 MEQ Oral Tablet Extended Release; TAKE 1 TABLET DAILY; Therapy: 76GNJ5153 to (Evaluate:10Oct2017)  Requested for: 15Oct2016; Last   Rx:15Oct2016 Ordered   7  MetFORMIN HCl  MG Oral Tablet Extended Release 24 Hour; 1 PO BID; Therapy: 89EWB6522 to (Last Rx:18Xqp7020)  Requested for: 12Ubs9367 Ordered   8  Metoprolol Succinate ER 25 MG Oral Tablet Extended Release 24 Hour; take 1 tablet by   mouth every day; Therapy: 18ZFU0804 to (Evaluate:08Jun2017)  Requested for: 87JLB4899; Last   Rx:13Jun2016 Ordered   9  Omeprazole 20 MG Oral Capsule Delayed Release; TAKE 1 CAPSULE EVERY DAY; Therapy: 53UWW3871 to (Santy Hughes)  Requested for: 22MHZ0606; Last   Rx:29Gdk6664 Ordered   10  Simvastatin 20 MG Oral Tablet; TAKE 1 TABLET DAILY; Therapy: 19ZWA1921 to (Evaluate:19Mar2018)  Requested for: 45TKT7930; Last    Rx:24Mar2017 Ordered   11  Slow-Mag 71 5-119 MG Oral Tablet Delayed Release; 1 PO QD; Therapy: 11PRO1643 to (Last Rx:80Tdc5100)  Requested for: 96Xug0137 Ordered   12  Tamsulosin HCl - 0 4 MG Oral Capsule; take 1 capsule by mouth daily; Therapy: 24DYR2954 to (Fredis Kauffman)  Requested for: 84PSV6028; Last    Rx:10Nov2016 Ordered   13  Xarelto 15 MG Oral Tablet; take 1 tablet by mouth every day; Therapy: 77PQR5053 to (Arcelia Fraser)  Requested for: 29Peo9539; Last    Rx:55Cev0254 Ordered    Allergies    1  Keflex CAPS    Assessment    1   Avulsion, skin (879 8) (T14 8)    Plan  Avulsion, skin    · Follow-up as previously scheduled Evaluation and Treatment Follow-up  Status:  Complete  Done: 15CBF7813 09:50AM    Future Appointments    Date/Time Provider Specialty Site   06/12/2017 03:30 PM Photo Doretha Ahumada, Nurse Schedule  [facility]   12/04/2017 11:35 AM ARCHIE Springer   Dermatology [facility]     Signatures   Electronically signed by : ARCHIE Morataya ; Jun 11 2017  9:50AM EST                       (Author)

## 2018-01-18 NOTE — CONSULTS
Current Meds   1  Advair Diskus 250-50 MCG/DOSE Inhalation Aerosol Powder Breath Activated; TAKE 1   PUFF EVERY 12 HOURS AS NEEDED; Therapy: 40MSP9409 to ([place])  Requested for: 31RJD3109; Last   Rx:06Nov2015 Ordered   2  Alendronate Sodium 70 MG Oral Tablet; take 1 tablet by mouth every week; Therapy: 30HRN8014 to (Erlin Lopez)  Requested for: 23OTV0923; Last   Rx:49Acg2813 Ordered   3  Citracal Maximum 315-250 MG-UNIT Oral Tablet; Therapy: (Mikey Mills) to Recorded   4  DiltiaZEM HCl ER Coated Beads 120 MG Oral Capsule Extended Release 24 Hour; take   1 capsule daily; Therapy: 92VFG8886 to (Brianne Wei)  Requested for: 43ULY9711; Last   Rx:20Esa9465 Ordered   5  FreeStyle Lancets Miscellaneous; TEST 1 QD; Therapy: 09QJN0161 to (Last Rx:15Jan2016)  Requested for: 58IEA2017 Ordered   6  FreeStyle Lite Test In [employer]; test twice daily; Therapy: 06XMQ8238 to (Evaluate:10Nov2016)  Requested for: 15VPJ8045; Last   Rx:15Jan2016 Ordered   7  Furosemide 40 MG Oral Tablet; TAKE 1 TABLET DAILY  Requested for: 87DCX8421; Last   Rx:15Oct2016 Ordered   8  Klor-Con M20 20 MEQ Oral Tablet Extended Release; TAKE 1 TABLET DAILY; Therapy: 47PWI9338 to (Evaluate:10Oct2017)  Requested for: 15Oct2016; Last   Rx:15Oct2016 Ordered   9  MetFORMIN HCl  MG Oral Tablet Extended Release 24 Hour (Glucophage XR); 2   pills daily with dinner; Therapy: 76JQI2811 to (Last Rx:15Oct2016) Ordered   10  Metoprolol Succinate ER 25 MG Oral Tablet Extended Release 24 Hour; take 1 tablet by    mouth every day; Therapy: 11ANM7790 to (Evaluate:08Jun2017)  Requested for: 62REK3211; Last    Rx:13Jun2016 Ordered   11  Omeprazole 20 MG Oral Capsule Delayed Release; TAKE 1 CAPSULE DAILY     Requested for: 97AYW1741; Last Rx:55Nzn6254 Ordered   12  Simvastatin 20 MG Oral Tablet; TAKE 1 TABLET DAILY;     Therapy: 74XUP0781 to (Megan Kruger)  Requested for: 40AMI9214; Last    Rx:42Mdc0193 Ordered   13  Tamsulosin HCl - 0 4 MG Oral Capsule; take 1 capsule by mouth daily; Therapy: 53OSV9760 to (Lei Smart)  Requested for: 07QAS8694; Last    Rx:73Auv4156 Ordered   14  Xarelto 15 MG Oral Tablet; take 1 tablet by mouth every day; Therapy: 22ZPK2742 to (Rosangela Found)  Requested for: 09Wrg2923; Last    Rx:96Htc3714 Ordered    Allergies    1  Keflex CAPS    Procedure    Procedure: Ilsa Dubois was assessed with a Holter monitor  afib The patient complains of no symptoms   Duration: 48 hours  Findings: This Holter monitor and analysis was done for a period of approximately 48 hours  Rhythm is predominantly sinus  Average heart rate 60 bpm, minimum heart rate 36 bpm, maximum heart rate 102 bpm  There were multiple PVCs, episodes of ventricular bigeminy, trigeminy and couplets  There was one 7 beat run of NSVT  There were a few PACs and supraventricular couplets  No atrial fibrillation noted in this study  The longest pause was 2 8 seconds  8  Patient was asymptomatic  Patient did not report any cardiovascular symptoms  Findings discussed with Dr Laura Ramírez        Signatures   Electronically signed by : ARCHIE Bliss ; Oct 25 2016  9:18AM EST                       (Author)

## 2018-01-22 DIAGNOSIS — R91.8 OTHER NONSPECIFIC ABNORMAL FINDING OF LUNG FIELD (CODE): ICD-10-CM

## 2018-01-23 VITALS
DIASTOLIC BLOOD PRESSURE: 70 MMHG | RESPIRATION RATE: 14 BRPM | HEART RATE: 60 BPM | WEIGHT: 170.38 LBS | BODY MASS INDEX: 26.74 KG/M2 | HEIGHT: 67 IN | SYSTOLIC BLOOD PRESSURE: 128 MMHG

## 2018-01-23 NOTE — MISCELLANEOUS
History of Present Illness  TCM Communication [facility]: He was hospitalized at [facility]  The date of admission: 11/29/17, date of discharge: 12/4/17  Diagnosis: significant muscle pain/CK >2500  He was discharged to a rehabilitation center  He did not schedule a follow up appointment  Communication performed and completed by      Active Problems    1  Acute bronchitis (466 0) (J20 9)   2  Acute pharyngitis (462) (J02 9)   3  Arteriosclerosis of coronary artery (414 00) (I25 10)   4  Atrial fibrillation (427 31) (I48 91)   5  Avulsion, skin (879 8) (T14 8XXA)   6  Benign prostatic hyperplasia (600 00) (N40 0)   7  Changing skin lesion (709 9) (L98 9)   8  Chronic kidney disease (CKD), 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 2) (N18 2)   9  Chronic obstructive pulmonary disease (496) (J44 9)   10  Common cold (460) (J00)   11  DMII (diabetes mellitus, type 2) (250 00) (E11 9)   12  Esophageal reflux (530 81) (K21 9)   13  Fall (E888 9) (W19 XXXA)   14  Hyperlipidemia (272 4) (E78 5)   15  Hypertension (401 9) (I10)   16  Hypoglycemia (251 2) (E16 2)   17  Hypomagnesemia (275 2) (E83 42)   18  Iron deficiency anemia (280 9) (D50 9)   19  No advance directive on file (V49 89) (Z78 9)   20  Osteoporosis (733 00) (M81 0)   21  Polycythemia (238 4) (D75 1)   22  Screening for depression (V79 0) (Z13 89)   23  Screening for genitourinary condition (V81 6) (Z13 89)   24  Screening for skin condition (V82 0) (Z13 89)   25  Seborrheic keratosis (702 19) (L82 1)   26  Skin neoplasm (239 2) (D49 2)   27  Skin tear of right hand without complication (727 2) (L09 201N)   28  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)   29  Squamous cell cancer of skin of left forearm (173 62) (C44 629)   30  Venous stasis dermatitis of both lower extremities (454 1) (I87 2)   31  Vitamin D deficiency (268 9) (E55 9)   32  Hortensia (426 13) (I44 1)    Past Medical History    1  History of Anticoagulant long-term use (U33 61) (Z79 01)   2  History of Congestive heart failure (428 0) (I50 9)   3  History of DEXA Body Composition Study   4  H/O nonmelanoma skin cancer (V10 83) (Z85 828)   5  History of abdominal aortic aneurysm (V12 59) (Z86 79)   6  History of chronic kidney disease (V13 09) (Z87 448)   7  History of edema (V13 89) (Z87 898)   8  History of Morbid or severe obesity due to excess calories (278 01) (E66 01)   9  History of Osteoporosis (733 00) (M81 0)   10  History of Parkinsons disease, secondary (332 1) (G21 9)   11  History of Pneumonia (486) (J18 9)   12  History of Renal failure (586) (N19)   13  History of Upper Respiratory Tract Disorders (478 9)    Surgical History    1  History of Coronary Artery Surgery   2  History of PTCA    Family History  Mother    1  Family history of Acute Myocardial Infarction (V17 3)  Father    2  Family history of Acute Myocardial Infarction (V17 3)   3  Family history of Emphysema   4  Family history of Hypertension (V17 49)    Social History    · Former smoker (V15 82) (F73 169)   · Marital History -    · Never Drank Alcohol   · Never Used Drugs   · No advance directive on file (V49 89) (Z78 9)    Current Meds   1  Advair Diskus 250-50 MCG/DOSE Inhalation Aerosol Powder Breath Activated; TAKE 1   PUFF EVERY 12 HOURS AS NEEDED; Therapy: 04KVK7279 to (Bridger Maldonado)  Requested for: 76Qjf8570; Last   Rx:97Uzi1953 Ordered   2  Alendronate Sodium 70 MG Oral Tablet; take 1 tablet by mouth every week; Therapy: 29MVD5862 to ([facility])  Requested for: 09QCO7815; Last   Rx:01Xiq2699 Ordered   3  FreeStyle Lancets Miscellaneous; TEST 1 QD; Therapy: 72REZ2390 to (Last Rx:15Jan2016)  Requested for: 80XFI5223 Ordered   4  FreeStyle Lite Test In [employer]; test twice daily; Therapy: 00PDN9338 to (Evaluate:10Nov2016)  Requested for: 56JOM2353; Last   Rx:15Jan2016 Ordered   5  Furosemide 40 MG Oral Tablet; TAKE 1 TABLET TWICE DAILY;    Therapy: 55HEI5447 to [phone]) Requested for: 39Wlm7148; Last   Rx:17Kbw4669 Ordered   6  Iron 325 (65 Fe) MG Oral Tablet; TAKE 1 TABLET DAILY AS DIRECTED; Last   Rx:06Vfy4783 Ordered   7  Klor-Con M20 20 MEQ Oral Tablet Extended Release; take 2 tablets daily; Therapy: 68SGL5596 to [phone]  Requested for: 50VUO5156; Last   Rx:74Olg3071 Ordered   8  MetFORMIN HCl  MG Oral Tablet Extended Release 24 Hour (Glucophage XR); 1   PO BID; Therapy: 07FLJ3129 to (Last Rx:79Eex7152)  Requested for: 49Iit0098 Ordered   9  Metoprolol Succinate ER 25 MG Oral Tablet Extended Release 24 Hour; take 1 tablet by   mouth every day; Therapy: 57PYK8424 to (Evaluate:85Hvq0701)  Requested for: 43Dfq8507; Last   Rx:51Tov6795 Ordered   10  Omeprazole 20 MG Oral Capsule Delayed Release; TAKE 1 CAPSULE EVERY DAY; Therapy: 40SXU0481 to (Jose Manuel Tong)  Requested for: 99IGF1949; Last    Rx:05Cgo4808 Ordered   11  Simvastatin 20 MG Oral Tablet; TAKE 1 TABLET DAILY; Therapy: 06JGO3293 to (Evaluate:19Mar2018)  Requested for: 00PAN6846; Last    Rx:24Mar2017 Ordered   12  Slow-Mag 71 5-119 MG Oral Tablet Delayed Release; 1 PO QD; Therapy: 03PZD4994 to (Last Rx:50Wnx9313)  Requested for: 16Rts4339 Ordered   13  Tamsulosin HCl - 0 4 MG Oral Capsule; take 1 capsule by mouth daily; Therapy: 79TBT8656 to (Zoila Turner)  Requested for: 19YWT4344; Last    Rx:27Nov2017 Ordered   14  Xarelto 15 MG Oral Tablet; take 1 tablet by mouth every day; Therapy: 18JOW0824 to (Evaluate:11Nov2017)  Requested for: 12Azp0364; Last    Rx:95Wqd2184 Ordered    Allergies    1  [employer] CAPS    Health Management  Iron deficiency anemia   COLONOSCOPY; every 10 years; Last 83GYF7961; Next Due: 75Aoj0715; Active  Health Maintenance   Dexa Scan; every 2 years; Next Due: 14Ehl4221;  Overdue    Future Appointments    Date/Time Provider Specialty Site   02/06/2018 10:30 AM Prince Shine, [address] [facility] Medicine [facility] [place] [facility]   12/07/2017 01:45 PM Photo Sharan, Nurse Schedule  [facility]     Signatures   Electronically signed by : Jace Melchor, ; Dec  5 2017 11:52AM EST                       (Author)    Electronically signed by : ARCHIE Salmeron ; Dec  5 2017 12:15PM EST

## 2018-01-23 NOTE — PROGRESS NOTES
Chief Complaint  patient came in for unnaboot change, patient states he has rehab nurses going to his home and they changed his unnaboot on wednesday  patient will return on friday  Active Problems     1  Acute bronchitis (466 0) (J20 9)   2  Acute pharyngitis (462) (J02 9)   3  Arteriosclerosis of coronary artery (414 00) (I25 10)   4  Atrial fibrillation (427 31) (I48 91)   5  Avulsion, skin (879 8) (T14 8XXA)   6  Benign prostatic hyperplasia (600 00) (N40 0)   7  Changing skin lesion (709 9) (L98 9)   8  Chronic kidney disease (CKD), 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 2) (N18 2)   9  Common cold (460) (J00)   10  Esophageal reflux (530 81) (K21 9)   11  Fall (E888 9) (W19 XXXA)   12  Hyperlipidemia (272 4) (E78 5)   13  Hypoglycemia (251 2) (E16 2)   14  Hypomagnesemia (275 2) (E83 42)   15  Iron deficiency anemia (280 9) (D50 9)   16  Polycythemia (238 4) (D75 1)   17  Screening for depression (V79 0) (Z13 89)   18  Screening for genitourinary condition (V81 6) (Z13 89)   19  Screening for skin condition (V82 0) (Z13 89)   20  Seborrheic keratosis (702 19) (L82 1)   21  Skin neoplasm (239 2) (D49 2)   22  Skin tear of right hand without complication (988 8) (B91 051B)   23  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)   24  Squamous cell cancer of skin of left forearm (173 62) (C44 629)   25  Ulcer of left lower extremity with fat layer exposed (707 10) (L97 922)   26  Venous stasis dermatitis of both lower extremities (454 1) (I87 2)   27  Vitamin D deficiency (268 9) (E55 9)   28  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)    Hypertension (401 9) (I10)       Chronic obstructive pulmonary disease (496) (J44 9)       DMII (diabetes mellitus, type 2) (250 00) (E11 9)       Osteoporosis (733 00) (M81 0)       No advance directive on file (V49 89) (Z78 9)          Current Meds   1  Advair Diskus 250-50 MCG/DOSE Inhalation Aerosol Powder Breath Activated; TAKE 1   PUFF EVERY 12 HOURS AS NEEDED;    Therapy: 24LTX1629 to ([facility])  Requested for: 82Iil9426; Last   Rx:04Sxo9107 Ordered   2  Alendronate Sodium 70 MG Oral Tablet; take 1 tablet by mouth every week; Therapy: 27DGL1990 to (Edward Reynoso)  Requested for: 53ARA9943; Last   Rx:54Dhw4097 Ordered   3  FreeStyle Lancets Miscellaneous; TEST 1 QD; Therapy: 96MKZ9533 to (Last Rx:15Jan2016)  Requested for: 51GDT1044 Ordered   4  FreeStyle Lite Test In [employer]; test twice daily; Therapy: 66NDE4058 to (Evaluate:10Nov2016)  Requested for: 69GXZ1828; Last   Rx:15Jan2016 Ordered   5  Furosemide 40 MG Oral Tablet; TAKE 1 TABLET TWICE DAILY; Therapy: 55UFC1005 to ([phone])  Requested for: 61HVX8478; Last   Rx:79Hkf6931 Ordered   6  Ipratropium-Albuterol 0 5-2 5 (3) MG/3ML Inhalation Solution; USE 1 UNIT DOSE IN   NEBULIZER BID-QID, CAN USE Q 4 HRS PRN; Therapy: 25LEJ2327 to (Last Rx:46Klx5837)  Requested for: 80ZEU2736 Ordered   7  Iron 325 (65 Fe) MG Oral Tablet; TAKE 1 TABLET DAILY AS DIRECTED; Last   Rx:91Psw0060 Ordered   8  Klor-Con M20 20 MEQ Oral Tablet Extended Release; take 2 tablets daily; Therapy: 11NFQ3402 to [phone])  Requested for: 49NXP4981; Last   Rx:24Zbm4230 Ordered   9  MetFORMIN HCl  MG Oral Tablet Extended Release 24 Hour; 1 PO BID; Therapy: 20IJV6777 to (Last Rx:56Onw7013)  Requested for: 43Ndf2349 Ordered   10  Metoprolol Succinate ER 25 MG Oral Tablet Extended Release 24 Hour; take 1 tablet by    mouth every day; Therapy: 35WZJ4689 to (Evaluate:29Psi7047)  Requested for: 37Dgs6276; Last    Rx:31Sax4783 Ordered   11  Omeprazole 20 MG Oral Capsule Delayed Release; TAKE 1 CAPSULE EVERY DAY; Therapy: 81RMX4807 to ([facility])  Requested for: 89JQK3082; Last    Rx:96Daz5635 Ordered   12  Simvastatin 20 MG Oral Tablet; TAKE 1 TABLET DAILY; Therapy: 07BPN0385 to (Evaluate:19Mar2018)  Requested for: 13GON6417; Last    Rx:24Mar2017 Ordered   13   Slow-Mag 71 5-119 MG Oral Tablet Delayed Release; 1 PO QD; Therapy: 07FLF3204 to (Last Rx:06Apr2017)  Requested for: 06Apr2017 Ordered   14  Tamsulosin HCl - 0 4 MG Oral Capsule; take 1 capsule by mouth daily; Therapy: 78TJF0621 to (Kim Arevalo)  Requested for: 33ASR9817; Last    Rx:27Nov2017 Ordered   15  Xarelto 15 MG Oral Tablet; take 1 tablet by mouth every day; Therapy: 82IQD4933 to (Evaluate:11Nov2017)  Requested for: 56Wpt9409; Last    Rx:90Fyy3310 Ordered    Allergies    1   Keflex CAPS    Future Appointments    Date/Time Provider Specialty Site   02/06/2018 10:15 AM Prabhu White  Internal Medicine [facility]   01/12/2018 08:00 AM Photo Inderjit Fields, Nurse Schedule  [facility] OF [facility]     Signatures   Electronically signed by : ARCHIE Eubanks ; Jan 6 2018  5:44PM EST                       (Author)

## 2018-01-29 DIAGNOSIS — E78.5 HYPERLIPIDEMIA: ICD-10-CM

## 2018-01-29 DIAGNOSIS — N18.2 CHRONIC KIDNEY DISEASE, STAGE II (MILD): ICD-10-CM

## 2018-01-29 DIAGNOSIS — E11.9 TYPE 2 DIABETES MELLITUS WITHOUT COMPLICATIONS (HCC): ICD-10-CM

## 2018-01-29 DIAGNOSIS — M81.0 AGE-RELATED OSTEOPOROSIS WITHOUT CURRENT PATHOLOGICAL FRACTURE: ICD-10-CM

## 2018-01-29 DIAGNOSIS — D50.9 IRON DEFICIENCY ANEMIA: ICD-10-CM

## 2018-01-29 DIAGNOSIS — I10 ESSENTIAL (PRIMARY) HYPERTENSION: ICD-10-CM

## 2018-01-31 ENCOUNTER — TRANSCRIBE ORDERS (OUTPATIENT)
Dept: LAB | Facility: CLINIC | Age: 83
End: 2018-01-31

## 2018-02-05 ENCOUNTER — HOSPITAL ENCOUNTER (OUTPATIENT)
Dept: CT IMAGING | Facility: CLINIC | Age: 83
Discharge: HOME | End: 2018-02-05
Payer: MEDICARE

## 2018-02-05 DIAGNOSIS — R91.8 OTHER NONSPECIFIC ABNORMAL FINDING OF LUNG FIELD (CODE): ICD-10-CM

## 2018-02-05 PROCEDURE — 71250 CT THORAX DX C-: CPT

## 2018-02-06 ENCOUNTER — TELEPHONE (OUTPATIENT)
Dept: INTERNAL MEDICINE CLINIC | Facility: CLINIC | Age: 83
End: 2018-02-06

## 2018-02-07 NOTE — PROGRESS NOTES
Notify-ct shows improvement in nodules previously seen, but new inflammation   how does he feel, any cough, congestion, or sob? May need to expedite f/u  He should also have f/u w/ Pulm, but I don't see appt  ??

## 2018-02-08 DIAGNOSIS — J18.9 RECURRENT PNEUMONIA: Primary | ICD-10-CM

## 2018-02-08 DIAGNOSIS — I10 HYPERTENSION, UNSPECIFIED TYPE: ICD-10-CM

## 2018-02-08 RX ORDER — POTASSIUM CHLORIDE 1500 MG/1
TABLET, EXTENDED RELEASE ORAL
Qty: 180 TABLET | Refills: 0 | Status: SHIPPED | OUTPATIENT
Start: 2018-02-08 | End: 2018-03-19 | Stop reason: SDUPTHER

## 2018-02-08 NOTE — TELEPHONE ENCOUNTER
I spoke w/ manisha, daughter ( on hippa) she said he has a cough, productive at times, hard to tell if he has any sob bc he never complains  Doesn't have f/u w/ pulm, says he's never seen a pulmonologist before

## 2018-02-08 NOTE — TELEPHONE ENCOUNTER
----- Message from Abraham Rubio MD sent at 2/7/2018  3:48 PM EST -----  Notify-ct shows improvement in nodules previously seen, but new inflammation   how does he feel, any cough, congestion, or sob? May need to expedite f/u  He should also have f/u w/ Pulm, but I don't see appt  ??

## 2018-02-09 ENCOUNTER — TELEPHONE (OUTPATIENT)
Dept: INTERNAL MEDICINE CLINIC | Facility: CLINIC | Age: 83
End: 2018-02-09

## 2018-02-09 NOTE — TELEPHONE ENCOUNTER
I am going to hold off on treatment unless he shows any clinical signs of deterioration, worsening cough, fever, shortness of breath, change in mental status etc

## 2018-02-09 NOTE — TELEPHONE ENCOUNTER
I am going to hold off on treatment unless he develops fever, shortness of breath or any other clinical deterioration

## 2018-02-09 NOTE — TELEPHONE ENCOUNTER
PT'S DAUGHTER CALLED AND SD SHE DID GET AN APPT W/[facility] Tuesday 2 13 18 @ 8:30    IS THIS SOON ENOUGH ???  ALSO SD IF HE NEEDS TREATMENT, MEDS  Dewayne Raymundo OR ANYTHING BEFORE THEN  Yajaira More Human PRESCRIBE FOR HIM ????

## 2018-02-12 ENCOUNTER — TELEPHONE (OUTPATIENT)
Dept: INTERNAL MEDICINE CLINIC | Facility: CLINIC | Age: 83
End: 2018-02-12

## 2018-02-12 DIAGNOSIS — E78.5 HYPERLIPIDEMIA, UNSPECIFIED HYPERLIPIDEMIA TYPE: Primary | ICD-10-CM

## 2018-02-12 RX ORDER — SIMVASTATIN 10 MG
20 TABLET ORAL
Qty: 180 TABLET | Refills: 3 | Status: SHIPPED | OUTPATIENT
Start: 2018-02-12 | End: 2018-05-03

## 2018-02-12 NOTE — TELEPHONE ENCOUNTER
SHE ABRIL AMOS HAS BEEN REQUESTING SIMVASTATIN FOR PT AS HE IS OUT OF HIS MED  Pb Stover ALSO SD THEY DONT HAVE 20 Mg    SO WILL NEED RX FOR 10 MG BID  Pb Stover

## 2018-02-13 ENCOUNTER — OFFICE VISIT (OUTPATIENT)
Dept: PULMONOLOGY | Facility: CLINIC | Age: 83
End: 2018-02-13
Payer: MEDICARE

## 2018-02-13 VITALS
OXYGEN SATURATION: 92 % | WEIGHT: 168 LBS | DIASTOLIC BLOOD PRESSURE: 84 MMHG | HEART RATE: 56 BPM | HEIGHT: 69 IN | BODY MASS INDEX: 24.88 KG/M2 | SYSTOLIC BLOOD PRESSURE: 134 MMHG

## 2018-02-13 DIAGNOSIS — J69.0 ASPIRATION PNEUMONIA OF RIGHT MIDDLE LOBE, UNSPECIFIED ASPIRATION PNEUMONIA TYPE (HCC): ICD-10-CM

## 2018-02-13 DIAGNOSIS — J43.2 CENTRILOBULAR EMPHYSEMA (HCC): ICD-10-CM

## 2018-02-13 DIAGNOSIS — R93.89 ABNORMAL CT OF THE CHEST: Primary | ICD-10-CM

## 2018-02-13 PROCEDURE — 99214 OFFICE O/P EST MOD 30 MIN: CPT | Performed by: INTERNAL MEDICINE

## 2018-02-13 RX ORDER — SIMVASTATIN 20 MG
1 TABLET ORAL DAILY
COMMUNITY
Start: 2014-10-09 | End: 2018-05-12 | Stop reason: SDUPTHER

## 2018-02-13 RX ORDER — ALENDRONATE SODIUM 70 MG/1
1 TABLET ORAL WEEKLY
COMMUNITY
Start: 2014-10-20 | End: 2018-03-12 | Stop reason: SDUPTHER

## 2018-02-13 RX ORDER — TAMSULOSIN HYDROCHLORIDE 0.4 MG/1
1 CAPSULE ORAL DAILY
COMMUNITY
Start: 2014-10-09 | End: 2019-01-09 | Stop reason: SDUPTHER

## 2018-02-13 RX ORDER — POTASSIUM CHLORIDE 20 MEQ/1
2 TABLET, EXTENDED RELEASE ORAL DAILY
COMMUNITY
Start: 2015-03-16 | End: 2019-02-08 | Stop reason: HOSPADM

## 2018-02-13 RX ORDER — METOPROLOL SUCCINATE 25 MG/1
1 TABLET, EXTENDED RELEASE ORAL DAILY
COMMUNITY
Start: 2015-08-17 | End: 2018-08-23 | Stop reason: SDUPTHER

## 2018-02-13 RX ORDER — FUROSEMIDE 40 MG/1
1 TABLET ORAL 2 TIMES DAILY
COMMUNITY
Start: 2017-07-31 | End: 2019-02-08 | Stop reason: HOSPADM

## 2018-02-13 RX ORDER — METFORMIN HYDROCHLORIDE 500 MG/1
TABLET, EXTENDED RELEASE ORAL DAILY
COMMUNITY
Start: 2016-07-06 | End: 2019-02-08 | Stop reason: HOSPADM

## 2018-02-13 RX ORDER — OMEPRAZOLE 20 MG/1
1 CAPSULE, DELAYED RELEASE ORAL DAILY
COMMUNITY
Start: 2017-05-12 | End: 2018-05-19 | Stop reason: SDUPTHER

## 2018-02-13 RX ORDER — LANCETS 28 GAUGE
EACH MISCELLANEOUS DAILY
COMMUNITY
Start: 2016-01-15

## 2018-02-13 NOTE — PROGRESS NOTES
Assessment/Plan:   Diagnoses and all orders for this visit:    Abnormal CT of the chest    Aspiration pneumonia of right middle lobe, unspecified aspiration pneumonia type ([facility] [address] )  -     FL barium swallow video w speech; Future  -     Cancel: CBC and differential; Future    Centrilobular emphysema (HCC)    Other orders  -     Lancets (FREESTYLE) lancets; by Does not apply route daily  -     Glucose Blood (FREESTYLE LITE TEST VI); by In Vitro route 2 (two) times a day  -     omeprazole (PriLOSEC) 20 mg delayed release capsule; Take 1 capsule by mouth daily  -     fluticasone-salmeterol (ADVAIR DISKUS) 250-50 mcg/dose inhaler; Inhale  -     alendronate (FOSAMAX) 70 mg tablet; Take 1 tablet by mouth once a week  -     furosemide (LASIX) 40 mg tablet; Take 1 tablet by mouth 2 (two) times a day  -     metFORMIN (GLUCOPHAGE-XR) 500 mg 24 hr tablet; Take by mouth daily  -     potassium chloride (KLOR-CON M20) 20 mEq tablet; Take 2 tablets by mouth daily  -     metoprolol succinate (TOPROL-XL) 25 mg 24 hr tablet; Take 1 tablet by mouth daily  -     simvastatin (ZOCOR) 20 mg tablet; Take 1 tablet by mouth daily  -     tamsulosin (FLOMAX) 0 4 mg; Take 1 capsule by mouth daily  -     rivaroxaban (XARELTO) 15 mg tablet; Take 1 tablet by mouth daily     reviewed the CT of the chest results and the images with the patient as well as the patient's daughter who has accompanied this visit  The the right upper and middle lobe multifocal opacities that were seen in the previous CT of the chest have resolved, there is a new right lower lobe opacity that is seen around 2 5 cm  Given waxing  and waning of these opacities probably aspiration  I have ordered for VBS  barium swallow with speech evaluation  Current lead do not think he needs any antibiotics currently, given no symptoms, daughter states he feels at his best currently    We will monitor for symptoms of any low-grade fevers fatigued lethargy, and seek medical attention right away   I will follow up in 4 weeks or p r n  earlier as needed  Return in about 4 weeks (around 3/13/2018)  All questions are answered to the patient's satisfaction and understanding  He verbalizes understanding  He is encouraged to call with any further questions or concerns  Portions of the record may have been created with voice recognition software  Occasional wrong word or "sound a like" substitutions may have occurred due to the inherent limitations of voice recognition software  Read the chart carefully and recognize, using context, where substitutions have occurred  ______________________________________________________________________    Chief Complaint:   Chief Complaint   Patient presents with    Cough    Shortness of Breath       Patient ID: Ginny Villa is a 81 y o  y o  male has a past medical history of Cardiac disease; COPD (chronic obstructive pulmonary disease) ([facility] [address] ); Diabetes mellitus ([facility] [address] ); GERD (gastroesophageal reflux disease); Hyperlipidemia; Hypertension; Pulmonary nodules (12/1/2017); and Renal disorder  2/13/2018  HPI a very pleasant 72-year-old former smoker who quit several years ago, with history of COPD centrilobular emphysema, who was admitted in the hospital after a fall end of November early December when we had seen him there, with an abnormal CT of the chest with multi lobar pneumonia, leukocytosis history of cough for about 3 or 4 weeks then  He was treated with antibiotics with doxycycline, and had a repeat CT of the chest done in 4 weeks and he is here for follow-up  Today the patient's daughter states he has does not have any symptoms of cough shortness of breath or wheezing he has been at his baseline, just got out of the rehab after a recent fall    Also does not have any swallowing issues currently, although the patient daughter does state had speech and swallow evaluation several years ago when he was in the hospital with pneumonia at [facility]  Review of Systems   Constitutional: Negative for appetite change, chills, diaphoresis, fatigue, fever and unexpected weight change  HENT: Negative for congestion, ear discharge, ear pain, nosebleeds, postnasal drip, rhinorrhea, sinus pain, sore throat and voice change  Eyes: Negative for pain, discharge and visual disturbance  Respiratory: Positive for cough ( with clear sputum occasionally, daughter sees normal ratling  nothing new  )  Negative for apnea, choking, chest tightness, shortness of breath, wheezing and stridor  Cardiovascular: Negative for chest pain, palpitations and leg swelling  Gastrointestinal: Negative for abdominal pain, blood in stool, constipation, diarrhea and vomiting  Endocrine: Negative for cold intolerance, heat intolerance, polydipsia, polyphagia and polyuria  Genitourinary: Negative for difficulty urinating and dysuria  Musculoskeletal: Negative for arthralgias and neck pain  Skin: Negative for pallor and rash  Allergic/Immunologic: Negative for environmental allergies and food allergies  Neurological: Negative for dizziness, speech difficulty, weakness and light-headedness  Hematological: Negative for adenopathy  Does not bruise/bleed easily  Psychiatric/Behavioral: Positive for sleep disturbance  Negative for agitation and confusion  The patient is not nervous/anxious  Smoking history: He reports that he quit smoking about 53 years ago  He smoked 2 00 packs per day   He has never used smokeless tobacco     The following portions of the patient's history were reviewed and updated as appropriate: allergies, current medications, past family history, past medical history, past social history, past surgical history and problem list     Immunization History   Administered Date(s) Administered    Influenza TIV (IM) 1936    Pneumococcal Polysaccharide PPV23 1936    Tdap 1936    Zoster 1935     Current Outpatient Prescriptions   Medication Sig Dispense Refill    alendronate (FOSAMAX) 70 mg tablet Take 1 tablet by mouth once a week      fluticasone-salmeterol (ADVAIR DISKUS) 250-50 mcg/dose inhaler Inhale      furosemide (LASIX) 40 mg tablet Take 1 tablet by mouth 2 (two) times a day      Glucose Blood (FREESTYLE LITE TEST VI) by In Vitro route 2 (two) times a day      Lancets (FREESTYLE) lancets by Does not apply route daily      metFORMIN (GLUCOPHAGE-XR) 500 mg 24 hr tablet Take by mouth daily      metoprolol succinate (TOPROL-XL) 25 mg 24 hr tablet Take 1 tablet by mouth daily      omeprazole (PriLOSEC) 20 mg delayed release capsule Take 1 capsule by mouth daily      potassium chloride (KLOR-CON M20) 20 mEq tablet Take 2 tablets by mouth daily      rivaroxaban (XARELTO) 15 mg tablet Take 1 tablet by mouth daily      simvastatin (ZOCOR) 20 mg tablet Take 1 tablet by mouth daily      tamsulosin (FLOMAX) 0 4 mg Take 1 capsule by mouth daily      alendronate (FOSAMAX) 70 mg tablet Take by mouth      fluticasone-salmeterol (ADVAIR DISKUS) 250-50 mcg/dose inhaler Inhale      furosemide (LASIX) 40 mg tablet Take by mouth      ipratropium (ATROVENT) 0 02 % nebulizer solution Take 2 5 mL by nebulization 3 (three) times a day 75 mL 0    KLOR-CON M20 20 MEQ tablet TAKE 2 TABLETS BY MOUTH DAILY 180 tablet 0    Lancets (FREESTYLE) lancets FreeStyle Lancets Miscellaneous  TEST 1 QD   Quantity: 1;  Refills: Tevin Hammer ;  Start 15-Ben-2016  Active  100 Miscellaneous Box      levalbuterol (XOPENEX) 1 25 mg/0 5 mL nebulizer solution Take 0 5 mL by nebulization 3 (three) times a day 1 each 0    magnesium chloride-calcium (SLOW-MAG) 71 5-119 mg Take by mouth      metFORMIN (GLUCOPHAGE-XR) 500 mg 24 hr tablet Take by mouth      metoprolol succinate (TOPROL-XL) 25 mg 24 hr tablet Take by mouth      Omeprazole 20 MG TBEC Take by mouth      rivaroxaban (XARELTO) 15 mg tablet Take by mouth      simvastatin (ZOCOR) 10 mg tablet Take 2 tablets (20 mg total) by mouth daily at bedtime 180 tablet 3    tamsulosin (FLOMAX) 0 4 mg Take by mouth       No current facility-administered medications for this visit  Allergies: Cephalexin    Objective:  Vitals:    02/13/18 0934   BP: 134/84   BP Location: Left arm   Patient Position: Sitting   Pulse: 56   SpO2: 92%   Weight: 76 2 kg (168 lb)   Height: 5' 9" (1 753 m)   Oxygen Therapy  SpO2: 92 %    Wt Readings from Last 3 Encounters:   02/13/18 76 2 kg (168 lb)   01/05/18 77 3 kg (170 lb 6 1 oz)   12/04/17 83 5 kg (184 lb 1 4 oz)     Body mass index is 24 81 kg/m²  Physical Exam   Constitutional: He is oriented to person, place, and time  He appears well-developed and well-nourished  HENT:   Head: Normocephalic and atraumatic  Eyes: Conjunctivae are normal  Pupils are equal, round, and reactive to light  Neck: Normal range of motion  Neck supple  No JVD present  No thyromegaly present  Cardiovascular: Normal rate, regular rhythm and normal heart sounds  Exam reveals no gallop and no friction rub  No murmur heard  Pulmonary/Chest: Effort normal and breath sounds normal  No respiratory distress  He has no wheezes  He has no rales  He exhibits no tenderness  Abdominal: Soft  Bowel sounds are normal    Musculoskeletal: Normal range of motion  He exhibits no edema, tenderness or deformity  Lymphadenopathy:     He has no cervical adenopathy  Neurological: He is alert and oriented to person, place, and time  Skin: Skin is warm and dry  Psychiatric: He has a normal mood and affect  Nursing note and vitals reviewed  Lab Review:   not applicable    Diagnostics:  I have personally reviewed pertinent reports  and I have personally reviewed pertinent films in PACS       Ct Chest Wo Contrast    Result Date: 2/6/2018  Narrative: CT CHEST WITHOUT IV CONTRAST INDICATION: R91 8: Other nonspecific abnormal finding of lung field   History taken directly from the electronic ordering system  COMPARISON: CT chest 11/30/2017  TECHNIQUE: CT examination of the chest was performed without intravenous contrast   Reformatted images were created in axial, sagittal, and coronal planes  Radiation dose length product (DLP) for this visit:  136 mGy-cm   This examination, like all CT scans performed in the [facility], was performed utilizing techniques to minimize radiation dose exposure, including the use of iterative reconstruction and automated exposure control  FINDINGS: LUNGS: Paraseptal emphysema is again noted  Airspace opacities within the right upper lobe have markedly improved with a few small residual opacities remaining  Surrounding groundglass opacities within the right upper lobe have resolved  The previously described mass within the medial aspect of the right middle lobe has resolved with few subcentimeter nodular opacities now present in right middle lobe  There are new airspace opacities and subcentimeter nodules within the right lower lobe  For reference, there is a 3 0 x 2 3 cm mass within the right lower lobe on series 205 image 43) sees  Calcified granuloma in the right lower lobe again noted  There are new subcentimeter tree-in-bud nodules throughout the left lung but most prominent within the lower lobe     Subsegmental atelectasis is also present in the left lower lobe  There are no tracheal or endobronchial lesions  There is no pneumothorax  PLEURA:  Unremarkable  HEART/GREAT VESSELS:  Unremarkable for patient's age  MEDIASTINUM AND ENID:  Unremarkable  CHEST WALL AND LOWER NECK: Normal  VISUALIZED STRUCTURES IN THE UPPER ABDOMEN:  Unremarkable  OSSEOUS STRUCTURES:  No acute fracture  No destructive osseous lesion  Old healing bilateral rib fractures appear stable       Impression: Interval improvement of multiple airspace opacities and nodules but with interval development of new airspace opacities and tree-in-bud nodules as described in detail above  Findings are most suspicious for recurrent infectious process  Given the waxing and waning nature, the possibility of an atypical infection and/or aspiration could be considered  Follow-up CT after clinical treatment (no sooner than 3 months after treatment completion) is recommended   Workstation performed: [id number]

## 2018-02-15 ENCOUNTER — HOSPITAL ENCOUNTER (OUTPATIENT)
Dept: BONE DENSITY | Facility: CLINIC | Age: 83
Discharge: HOME | End: 2018-02-15
Payer: MEDICARE

## 2018-02-15 DIAGNOSIS — M81.0 AGE-RELATED OSTEOPOROSIS WITHOUT CURRENT PATHOLOGICAL FRACTURE: ICD-10-CM

## 2018-02-15 PROCEDURE — 77080 DXA BONE DENSITY AXIAL: CPT

## 2018-03-07 NOTE — PROCEDURES
Current Meds   1  Advair Diskus 250-50 MCG/DOSE Inhalation Aerosol Powder Breath Activated; TAKE 1   PUFF EVERY 12 HOURS AS NEEDED; Therapy: 98TRK5176 to (Evaluate:15Mar2017)  Requested for: 67DXL9156; Last   Rx:94Nhs8396 Ordered   2  Alendronate Sodium 70 MG Oral Tablet; take 1 tablet by mouth every week; Therapy: 41API3712 to ([phone])  Requested for: 22MKO5976; Last   Rx:00Vbc6816 Ordered   3  Citracal Maximum 315-250 MG-UNIT Oral Tablet; Therapy: (Tegan Oms) to Recorded   4  FreeStyle Lancets Miscellaneous; TEST 1 QD; Therapy: 86INU4377 to (Last Rx:15Jan2016)  Requested for: 90SQR0839 Ordered   5  FreeStyle Lite Test In [employer]; test twice daily; Therapy: 77PBY5841 to (Evaluate:10Nov2016)  Requested for: 74NTY5259; Last   Rx:15Jan2016 Ordered   6  Furosemide 40 MG Oral Tablet; TAKE 1 TABLET DAILY  Requested for: 51ETJ1823; Last   Rx:15Oct2016 Ordered   7  Klor-Con M20 20 MEQ Oral Tablet Extended Release; TAKE 1 TABLET DAILY; Therapy: 09FUI9572 to (Evaluate:10Oct2017)  Requested for: 15Oct2016; Last   Rx:15Oct2016 Ordered   8  MetFORMIN HCl  MG Oral Tablet Extended Release 24 Hour (Glucophage XR); 1   PO BID; Therapy: 45UOT8168 to (Last Rx:07Nov2016) Ordered   9  Metoprolol Succinate ER 25 MG Oral Tablet Extended Release 24 Hour; take 1 tablet by   mouth every day; Therapy: 45IQL5507 to (Evaluate:08Jun2017)  Requested for: 04BJJ9122; Last   Rx:13Jun2016 Ordered   10  Omeprazole 20 MG Oral Capsule Delayed Release; TAKE 1 CAPSULE DAILY     Requested for: 13NMR5102; Last Rx:30Mkc9836 Ordered   11  Simvastatin 20 MG Oral Tablet; TAKE 1 TABLET DAILY; Therapy: 26YQL3636 to (Yancy Paige)  Requested for: 14FFS3759; Last    Rx:85Moe2589 Ordered   12  Slow-Mag 71 5-119 MG Oral Tablet Delayed Release; 1 PO BID; Therapy: 77CWS7016 to (Last Rx:07Nov2016)  Requested for: 06IWC6965 Ordered   13   Tamsulosin HCl - 0 4 MG Oral Capsule; take 1 capsule by mouth daily; Therapy: 54ZCK8874 to (Katja Bass)  Requested for: 10NVN2881; Last    Rx:10Nov2016 Ordered   14  Xarelto 15 MG Oral Tablet; take 1 tablet by mouth every day; Therapy: 18YBA3034 to (Pérez Carias)  Requested for: 02Eah4783; Last    Rx:19Iyd9319 Ordered    Allergies    1  Keflex CAPS    Procedure    Procedure: Sofia Flowers was assessed with a Holter monitor  Atrial flutter/bradycardia The patient complains of no symptoms   Duration: 48 hours  Findings: This Holter monitor and analysis was done for a period of approximately 48 hours  The rhythm is atrial flutter  Average heart rate 61 bpm, minimum heart rate 37 bpm, maximum heart rate 108 bpm  There were multiple PVCs few episodes of ventricular trigeminy and couplets  There were a few pauses mostly during sleeping hours  The longest pause was 2 8 seconds  Patient had no symptoms  Complications:  the quality of the study allowed for adequate interpretation  Future Appointments    Date/Time Provider Specialty Site   03/21/2017 01:15 PM ARCHIE Ashford  Internal Medicine [facility] [address] AND WOMEN'S \[facility]\""   01/23/2017 09:50 AM ARCHIE Tomlin   Dermatology [address] INPATIENT REHABILITATION     Signatures   Electronically signed by : ARCHIE Nichole ; Nov 30 2016  9:08PM EST                       (Author)

## 2018-03-07 NOTE — PROCEDURES
Current Meds   1  Advair Diskus 250-50 MCG/DOSE Inhalation Aerosol Powder Breath Activated; TAKE 1   PUFF EVERY 12 HOURS AS NEEDED; Therapy: 25VMA0759 to (Zelda Dunog)  Requested for: 27YLW8982; Last   Rx:06Nov2015 Ordered   2  Alendronate Sodium 70 MG Oral Tablet; take 1 tablet by mouth every week; Therapy: 53GXW4880 to ([phone] [id number])  Requested for: 96QJH5985; Last   Rx:[id number] Ordered   3  Citracal Maximum 315-250 MG-UNIT Oral Tablet; Therapy: (Latonia Metcalf) to Recorded   4  DiltiaZEM HCl ER Coated Beads 120 MG Oral Capsule Extended Release 24 Hour; take   1 capsule daily; Therapy: 83PWC6983 to (Tab Hare)  Requested for: 83HCC7769; Last   Rx:[id number] Ordered   5  FreeStyle Lancets Miscellaneous; TEST 1 QD; Therapy: 54QWS2067 to (Last Rx:15Jan2016)  Requested for: 42ESP0960 Ordered   6  FreeStyle Lite Test In [employer]; test twice daily; Therapy: 87PTS5760 to (Evaluate:10Nov2016)  Requested for: 69ISR9600; Last   Rx:15Jan2016 Ordered   7  Furosemide 40 MG Oral Tablet; TAKE 1 TABLET DAILY  Requested for: 01OEO1752; Last   Rx:15Oct2016 Ordered   8  Klor-Con M20 20 MEQ Oral Tablet Extended Release; TAKE 1 TABLET DAILY; Therapy: 74EFM8944 to (Evaluate:10Oct2017)  Requested for: 15Oct2016; Last   Rx:15Oct2016 Ordered   9  MetFORMIN HCl  MG Oral Tablet Extended Release 24 Hour (Glucophage XR); 2   pills daily with dinner; Therapy: 60JYM8935 to (Last Rx:15Oct2016) Ordered   10  Metoprolol Succinate ER 25 MG Oral Tablet Extended Release 24 Hour; take 1 tablet by    mouth every day; Therapy: 28FHO4345 to (Evaluate:08Jun2017)  Requested for: 81MPT2305; Last    Rx:13Jun2016 Ordered   11  Omeprazole 20 MG Oral Capsule Delayed Release; TAKE 1 CAPSULE DAILY     Requested for: 54YYG6475; Last Rx:79Chq0120 Ordered   12  Simvastatin 20 MG Oral Tablet; TAKE 1 TABLET DAILY;     Therapy: 22ZHI9115 to (Chetna Parham)  Requested for: 35VZC1747; Last    Rx:36Sux2665 Ordered   13  Tamsulosin HCl - 0 4 MG Oral Capsule; take 1 capsule by mouth daily; Therapy: 91ROW6741 to (Neha Santoro)  Requested for: 73GVY6948; Last    Rx:70Ekp9548 Ordered   14  Xarelto 15 MG Oral Tablet; take 1 tablet by mouth every day; Therapy: 79VSV2829 to (Barbara Clay)  Requested for: 88Fol5166; Last    Rx:37Mwe4818 Ordered    Allergies    1  Keflex CAPS    Procedure    Procedure: [place] Do was assessed with a Holter monitor  afib The patient complains of no symptoms   Duration: 48 hours  Findings: This Holter monitor and analysis was done for a period of approximately 48 hours  Rhythm is predominantly sinus  Average heart rate 60 bpm, minimum heart rate 36 bpm, maximum heart rate 102 bpm  There were multiple PVCs, episodes of ventricular bigeminy, trigeminy and couplets  There was one 7 beat run of NSVT  There were a few PACs and supraventricular couplets  No atrial fibrillation noted in this study  The longest pause was 2 8 seconds  8  Patient was asymptomatic  Patient did not report any cardiovascular symptoms  Findings discussed with Dr Felisha Castro  Future Appointments    Date/Time Provider Specialty Site   11/07/2016 10:45 AM ARCHIE Bean  Internal Medicine [facility]   01/23/2017 09:50 AM ARCHIE Smith   Dermatology [facility]     Signatures   Electronically signed by : ARCHIE Cerna ; Oct 25 2016  9:18AM EST                       (Author)

## 2018-03-08 DIAGNOSIS — M81.0 AGE-RELATED OSTEOPOROSIS WITHOUT CURRENT PATHOLOGICAL FRACTURE: Primary | ICD-10-CM

## 2018-03-08 RX ORDER — ALENDRONATE SODIUM 70 MG/1
TABLET ORAL
Qty: 12 TABLET | Refills: 0 | Status: SHIPPED | OUTPATIENT
Start: 2018-03-08 | End: 2018-03-19 | Stop reason: SDUPTHER

## 2018-03-09 DIAGNOSIS — M81.0 AGE-RELATED OSTEOPOROSIS WITHOUT CURRENT PATHOLOGICAL FRACTURE: Primary | ICD-10-CM

## 2018-03-10 RX ORDER — ALENDRONATE SODIUM 70 MG/1
TABLET ORAL
Qty: 12 TABLET | Refills: 0 | Status: SHIPPED | OUTPATIENT
Start: 2018-03-10 | End: 2018-05-03

## 2018-03-12 DIAGNOSIS — M81.0 AGE-RELATED OSTEOPOROSIS WITHOUT CURRENT PATHOLOGICAL FRACTURE: Primary | ICD-10-CM

## 2018-03-12 RX ORDER — ALENDRONATE SODIUM 70 MG/1
70 TABLET ORAL WEEKLY
Qty: 12 TABLET | Refills: 0 | Status: SHIPPED | OUTPATIENT
Start: 2018-03-12 | End: 2018-03-19 | Stop reason: SDUPTHER

## 2018-03-19 DIAGNOSIS — I10 HYPERTENSION, UNSPECIFIED TYPE: ICD-10-CM

## 2018-03-19 DIAGNOSIS — E11.9 TYPE 2 DIABETES MELLITUS WITHOUT COMPLICATION, WITHOUT LONG-TERM CURRENT USE OF INSULIN (HCC): Primary | ICD-10-CM

## 2018-03-19 RX ORDER — METFORMIN HYDROCHLORIDE 500 MG/1
TABLET, EXTENDED RELEASE ORAL
Qty: 90 TABLET | Refills: 3 | Status: SHIPPED | OUTPATIENT
Start: 2018-03-19 | End: 2018-05-03

## 2018-03-19 RX ORDER — POTASSIUM CHLORIDE 1500 MG/1
TABLET, EXTENDED RELEASE ORAL
Qty: 180 TABLET | Refills: 0 | Status: SHIPPED | OUTPATIENT
Start: 2018-03-19 | End: 2018-05-03

## 2018-04-08 DIAGNOSIS — I48.20 CHRONIC ATRIAL FIBRILLATION (HCC): Primary | ICD-10-CM

## 2018-04-08 RX ORDER — RIVAROXABAN 15 MG/1
TABLET, FILM COATED ORAL
Qty: 90 TABLET | Refills: 1 | Status: SHIPPED | OUTPATIENT
Start: 2018-04-08 | End: 2018-10-10 | Stop reason: SDUPTHER

## 2018-04-12 ENCOUNTER — HOSPITAL ENCOUNTER (OUTPATIENT)
Dept: RADIOLOGY | Facility: HOSPITAL | Age: 83
Discharge: HOME | End: 2018-04-12
Attending: INTERNAL MEDICINE
Payer: MEDICARE

## 2018-04-12 DIAGNOSIS — J69.0 ASPIRATION PNEUMONIA OF RIGHT MIDDLE LOBE, UNSPECIFIED ASPIRATION PNEUMONIA TYPE (HCC): ICD-10-CM

## 2018-04-12 PROCEDURE — 92611 MOTION FLUOROSCOPY/SWALLOW: CPT

## 2018-04-12 PROCEDURE — G8998 SWALLOW D/C STATUS: HCPCS

## 2018-04-12 PROCEDURE — G8997 SWALLOW GOAL STATUS: HCPCS

## 2018-04-12 PROCEDURE — G8996 SWALLOW CURRENT STATUS: HCPCS

## 2018-04-12 PROCEDURE — 74230 X-RAY XM SWLNG FUNCJ C+: CPT

## 2018-04-12 NOTE — PROCEDURES
Video Barium Swallow Study       Patient Name: Kandy Mary  QSOZW'Y Date: 4/12/2018        Past Medical History     Past Medical History:   Diagnosis Date    Cardiac disease     COPD (chronic obstructive pulmonary disease) ([facility] )     Diabetes mellitus ([facility] )     GERD (gastroesophageal reflux disease)     Hyperlipidemia     Hypertension     Pulmonary nodules 12/1/2017    Renal disorder         Past Surgical History  Past Surgical History:   Procedure Laterality Date    CORONARY ANGIOPLASTY WITH STENT PLACEMENT      EYE SURGERY             General Information: This 81 y o  male was seen today on an outpatient basis  He was referred for a video swallow study by his pulmonologist, Dr Twila Raines, due to recurrent pneumonias  Mr Clinton Garcia was accompanied to the study today by his daughter, Asa Pearl  He eats a Soft and Thin Liquid diet at home and both he and Asa Pearl deny coughing, choking, or other signs and symptoms of aspiration with intake by mouth  A video swallow study was conducted in order to rule out aspiration as a potential source of recurrent pneumonia       The study was conducted in lateral view  Textures assessed during this study include nectar-thick liquids via cup sip, thin liquids via cup sip, puree, and soft solids         Oral Stage:  Base of tongue retraction and velopharyngeal closure were within normal limits  Nectar-Thick Liquids: Bolus transfer to the valleculae prior to initiation of the pharyngeal swallow  Thin Liquids: Bolus transfer to the valleculae prior to initiation of the pharyngeal swallow  Puree: Lingual pumping  Piecemeal manipulation and transfer, with two total transfers and swallows observed  Soft Solids: Bolus transfer to the valleculae prior to initiation of the pharyngeal swallow  Piecemeal mastication, manipulation, and transfer, with three total transfers and swallows observed   Mastication lasted anywhere from 10 to 17 seconds with each portion of the bolus           Pharyngeal Stage:  Hyolaryngeal elevation was within normal limits  Epiglottic inversion was grossly within normal limits, albeit with sluggish retraction  Pharyngeal contraction was within normal limits  Nectar-Thick Liquids: Within normal limits  Thin Liquids: Variable laryngeal penetration during the swallow, ranging from no penetration to deep penetration reaching the level of the vocal folds  Using a small, single sip at a slow rate of intake significantly reduced frequency and severity of penetration  Moderate vallecular and pyriform sinus residuals after the swallow, successfully cleared with an additional cued swallow  Puree: Within normal limits  Soft Solids: Within normal limits  Esophageal Stage:  Not formally assessed  Assessment Summary:  The patient presents with moderate oral dysphagia and mild to moderate pharyngeal dysphagia, chiefly characterized by prolonged mastication and piecemeal transit with solids, and variable laryngeal penetration with thin liquids  No aspiration was observed during this study, however, given the degree of variability observed, the patient is likely at increased risk of aspiration over the course of normal intake         Recommendations:  1  Continue current Soft solid and Thin Liquid diet  2  Small sips of liquids at slow rate of intake  3  Double swallow with each sip of liquid, in order to clear residuals  4  If fatigue during meals is a concern, consider modifying solid textures such that all foods are very soft and moist   5  If aspiration of liquids continues to be a concern in the home enviroment, or if overt signs or symptoms of aspiration develop with liquid intake, may wish to consider downgrade to nectar-thick liquids for increased margin of safety         Results and recommendations were discussed with the patient and his daughter, Belen Ochoa, immediately following the study          Vandana Tomlinson MA, [address] [facility]  Speech-Language Pathologist

## 2018-05-01 ENCOUNTER — APPOINTMENT (OUTPATIENT)
Dept: LAB | Facility: CLINIC | Age: 83
End: 2018-05-01
Payer: MEDICARE

## 2018-05-01 DIAGNOSIS — I10 ESSENTIAL (PRIMARY) HYPERTENSION: ICD-10-CM

## 2018-05-01 DIAGNOSIS — D50.9 IRON DEFICIENCY ANEMIA: ICD-10-CM

## 2018-05-01 DIAGNOSIS — E78.5 HYPERLIPIDEMIA: ICD-10-CM

## 2018-05-01 DIAGNOSIS — E11.9 TYPE 2 DIABETES MELLITUS WITHOUT COMPLICATIONS (HCC): ICD-10-CM

## 2018-05-01 DIAGNOSIS — N18.2 CHRONIC KIDNEY DISEASE, STAGE II (MILD): ICD-10-CM

## 2018-05-01 LAB
ALBUMIN SERPL BCP-MCNC: 3 G/DL (ref 3.5–5)
ALP SERPL-CCNC: 101 U/L (ref 46–116)
ALT SERPL W P-5'-P-CCNC: 11 U/L (ref 12–78)
ANION GAP SERPL CALCULATED.3IONS-SCNC: 6 MMOL/L (ref 4–13)
AST SERPL W P-5'-P-CCNC: 13 U/L (ref 5–45)
BILIRUB SERPL-MCNC: 0.63 MG/DL (ref 0.2–1)
BUN SERPL-MCNC: 16 MG/DL (ref 5–25)
CALCIUM SERPL-MCNC: 8.7 MG/DL (ref 8.3–10.1)
CHLORIDE SERPL-SCNC: 105 MMOL/L (ref 100–108)
CHOLEST SERPL-MCNC: 102 MG/DL (ref 50–200)
CO2 SERPL-SCNC: 29 MMOL/L (ref 21–32)
CREAT SERPL-MCNC: 1.14 MG/DL (ref 0.6–1.3)
ERYTHROCYTE [DISTWIDTH] IN BLOOD BY AUTOMATED COUNT: 16.7 % (ref 11.6–15.1)
EST. AVERAGE GLUCOSE BLD GHB EST-MCNC: 140 MG/DL
FERRITIN SERPL-MCNC: 33 NG/ML (ref 8–388)
GFR SERPL CREATININE-BSD FRML MDRD: 60 ML/MIN/1.73SQ M
GLUCOSE P FAST SERPL-MCNC: 102 MG/DL (ref 65–99)
HBA1C MFR BLD: 6.5 % (ref 4.2–6.3)
HCT VFR BLD AUTO: 40.1 % (ref 36.5–49.3)
HDLC SERPL-MCNC: 56 MG/DL (ref 40–60)
HGB BLD-MCNC: 12.3 G/DL (ref 12–17)
IRON SERPL-MCNC: 24 UG/DL (ref 65–175)
LDLC SERPL CALC-MCNC: 36 MG/DL (ref 0–100)
MCH RBC QN AUTO: 23.3 PG (ref 26.8–34.3)
MCHC RBC AUTO-ENTMCNC: 30.7 G/DL (ref 31.4–37.4)
MCV RBC AUTO: 76 FL (ref 82–98)
NONHDLC SERPL-MCNC: 46 MG/DL
PLATELET # BLD AUTO: 221 THOUSANDS/UL (ref 149–390)
PMV BLD AUTO: 10.1 FL (ref 8.9–12.7)
POTASSIUM SERPL-SCNC: 4.1 MMOL/L (ref 3.5–5.3)
PROT SERPL-MCNC: 7.8 G/DL (ref 6.4–8.2)
RBC # BLD AUTO: 5.29 MILLION/UL (ref 3.88–5.62)
SODIUM SERPL-SCNC: 140 MMOL/L (ref 136–145)
TRIGL SERPL-MCNC: 49 MG/DL
TSH SERPL DL<=0.05 MIU/L-ACNC: 3.92 UIU/ML (ref 0.36–3.74)
WBC # BLD AUTO: 9.19 THOUSAND/UL (ref 4.31–10.16)

## 2018-05-01 PROCEDURE — 36415 COLL VENOUS BLD VENIPUNCTURE: CPT

## 2018-05-01 PROCEDURE — 84443 ASSAY THYROID STIM HORMONE: CPT

## 2018-05-01 PROCEDURE — 85027 COMPLETE CBC AUTOMATED: CPT

## 2018-05-01 PROCEDURE — 83036 HEMOGLOBIN GLYCOSYLATED A1C: CPT

## 2018-05-01 PROCEDURE — 83540 ASSAY OF IRON: CPT

## 2018-05-01 PROCEDURE — 80061 LIPID PANEL: CPT

## 2018-05-01 PROCEDURE — 80053 COMPREHEN METABOLIC PANEL: CPT

## 2018-05-01 PROCEDURE — 82728 ASSAY OF FERRITIN: CPT

## 2018-05-03 ENCOUNTER — OFFICE VISIT (OUTPATIENT)
Dept: INTERNAL MEDICINE CLINIC | Facility: CLINIC | Age: 83
End: 2018-05-03
Payer: MEDICARE

## 2018-05-03 VITALS
SYSTOLIC BLOOD PRESSURE: 130 MMHG | HEART RATE: 60 BPM | DIASTOLIC BLOOD PRESSURE: 62 MMHG | BODY MASS INDEX: 27.16 KG/M2 | HEIGHT: 69 IN | OXYGEN SATURATION: 95 % | WEIGHT: 183.4 LBS

## 2018-05-03 DIAGNOSIS — N18.9 CHRONIC KIDNEY DISEASE, UNSPECIFIED CKD STAGE: Chronic | ICD-10-CM

## 2018-05-03 DIAGNOSIS — I48.20 CHRONIC ATRIAL FIBRILLATION (HCC): Chronic | ICD-10-CM

## 2018-05-03 DIAGNOSIS — E03.9 ACQUIRED HYPOTHYROIDISM: ICD-10-CM

## 2018-05-03 DIAGNOSIS — R91.8 PULMONARY NODULES: ICD-10-CM

## 2018-05-03 DIAGNOSIS — J44.9 CHRONIC OBSTRUCTIVE PULMONARY DISEASE, UNSPECIFIED COPD TYPE (HCC): ICD-10-CM

## 2018-05-03 DIAGNOSIS — E11.8 TYPE 2 DIABETES MELLITUS WITH COMPLICATION, WITHOUT LONG-TERM CURRENT USE OF INSULIN (HCC): Primary | Chronic | ICD-10-CM

## 2018-05-03 PROCEDURE — 99214 OFFICE O/P EST MOD 30 MIN: CPT | Performed by: NURSE PRACTITIONER

## 2018-05-03 NOTE — PATIENT INSTRUCTIONS
Copd stable on current treatment recommend nebulizer twice a day    Questionable aspiration    He had a video fluoroscopy that indicated he could continue with soft solids and thin liquids we discussed just talking chin and double swallowing I discussed signs and symptoms of aspiration pneumonia such as fatigue, confusion fever cough etc he has no follow-up plan for repeat CT but we discussed with the family that we will scan him only if he becomes symptomatic    Osteoporosis bone density is stable he can take a drug holiday repeat bone density in 2 years    DM2-under excellent control continue daily metformin    Hypertension-stable on current medication    CKd-creatinine stable at 1 1    Subclinical hypothyroidism currently asymptomatic continue to monitor no medication at this time    Chronic venous insufficiency taking Lasix daily continue to monitor +1 edema today wrapped with Ace wraps    AFib heart rate controlled continue Xarelto

## 2018-05-03 NOTE — PROGRESS NOTES
Assessment/Plan:    Patient Instructions   Copd stable on current treatment recommend nebulizer twice a day    Questionable aspiration  He had a video fluoroscopy that indicated he could continue with soft solids and thin liquids we discussed just talking chin and double swallowing I discussed signs and symptoms of aspiration pneumonia such as fatigue, confusion fever cough etc he has no follow-up plan for repeat CT but we discussed with the family that we will scan him only if he becomes symptomatic    Osteoporosis bone density is stable he can take a drug holiday repeat bone density in 2 years    DM2-under excellent control continue daily metformin    Hypertension-stable on current medication    CKd-creatinine stable at 1 1    Subclinical hypothyroidism currently asymptomatic continue to monitor no medication at this time    Chronic venous insufficiency taking Lasix daily continue to monitor +1 edema today wrapped with Ace wraps    AFib heart rate controlled continue Xarelto         Diagnoses and all orders for this visit:    Type 2 diabetes mellitus with complication, without long-term current use of insulin (HCC)  -     CBC and differential; Future  -     Lipid panel; Future  -     HEMOGLOBIN A1C W/ EAG ESTIMATION; Future  -     Microalbumin / creatinine urine ratio; Future    Chronic atrial fibrillation (HCC)    Chronic obstructive pulmonary disease, unspecified COPD type ([facility] )  -     Comprehensive metabolic panel; Future    Chronic kidney disease, unspecified CKD stage    Pulmonary nodules    Acquired hypothyroidism  -     TSH, 3rd generation;  Future    Other orders  -      Diabetes Foot Exam         Subjective:      Patient ID: Amparo Huynh is a 81 y o  male    He is here for his routine visit he is accompanied with his daughter Misha Mills  He has no new physical concerns  He notes that his weights have been waxing and waning but his daughter states he has been eating more cookies than usual he does not feel like he has significant swelling to his legs  Occasional cough not using nebulizer  Had a swallowing study in February has not had the results also had bone density  Home sugars under 130          Current Outpatient Prescriptions:     fluticasone-salmeterol (ADVAIR DISKUS) 250-50 mcg/dose inhaler, Inhale, Disp: , Rfl:     furosemide (LASIX) 40 mg tablet, Take 1 tablet by mouth 2 (two) times a day, Disp: , Rfl:     Glucose Blood (FREESTYLE LITE TEST VI), by In Vitro route 2 (two) times a day, Disp: , Rfl:     ipratropium (ATROVENT) 0 02 % nebulizer solution, Take 2 5 mL by nebulization 3 (three) times a day, Disp: 75 mL, Rfl: 0    Lancets (FREESTYLE) lancets, FreeStyle Lancets Miscellaneous TEST 1 QD  Quantity: 1;  Refills: 5   Degler M D , Danya Bosworth ;  Start 15-Ben-2016 Active 100 Miscellaneous Box, Disp: , Rfl:     Lancets (FREESTYLE) lancets, by Does not apply route daily, Disp: , Rfl:     levalbuterol (XOPENEX) 1 25 mg/0 5 mL nebulizer solution, Take 0 5 mL by nebulization 3 (three) times a day, Disp: 1 each, Rfl: 0    magnesium chloride-calcium (SLOW-MAG) 71 5-119 mg, Take by mouth, Disp: , Rfl:     metFORMIN (GLUCOPHAGE-XR) 500 mg 24 hr tablet, Take by mouth daily, Disp: , Rfl:     metoprolol succinate (TOPROL-XL) 25 mg 24 hr tablet, Take 1 tablet by mouth daily, Disp: , Rfl:     omeprazole (PriLOSEC) 20 mg delayed release capsule, Take 1 capsule by mouth daily, Disp: , Rfl:     potassium chloride (KLOR-CON M20) 20 mEq tablet, Take 2 tablets by mouth daily, Disp: , Rfl:     rivaroxaban (XARELTO) 15 mg tablet, Take 1 tablet by mouth daily, Disp: , Rfl:     simvastatin (ZOCOR) 20 mg tablet, Take 1 tablet by mouth daily, Disp: , Rfl:     tamsulosin (FLOMAX) 0 4 mg, Take 1 capsule by mouth daily, Disp: , Rfl:     XARELTO 15 MG tablet, TAKE 1 TABLET BY MOUTH EVERY DAY, Disp: 90 tablet, Rfl: 1    Recent Results (from the past 1008 hour(s))   CBC    Collection Time: 05/01/18 10:30 AM   Result Value Ref Range    WBC 9 19 4 31 - 10 16 Thousand/uL    RBC 5 29 3 88 - 5 62 Million/uL    Hemoglobin 12 3 12 0 - 17 0 g/dL    Hematocrit 40 1 36 5 - 49 3 %    MCV 76 (L) 82 - 98 fL    MCH 23 3 (L) 26 8 - 34 3 pg    MCHC 30 7 (L) 31 4 - 37 4 g/dL    RDW 16 7 (H) 11 6 - 15 1 %    Platelets 534 648 - 057 Thousands/uL    MPV 10 1 8 9 - 12 7 fL   Comprehensive metabolic panel    Collection Time: 05/01/18 10:30 AM   Result Value Ref Range    Sodium 140 136 - 145 mmol/L    Potassium 4 1 3 5 - 5 3 mmol/L    Chloride 105 100 - 108 mmol/L    CO2 29 21 - 32 mmol/L    Anion Gap 6 4 - 13 mmol/L    BUN 16 5 - 25 mg/dL    Creatinine 1 14 0 60 - 1 30 mg/dL    Glucose, Fasting 102 (H) 65 - 99 mg/dL    Calcium 8 7 8 3 - 10 1 mg/dL    AST 13 5 - 45 U/L    ALT 11 (L) 12 - 78 U/L    Alkaline Phosphatase 101 46 - 116 U/L    Total Protein 7 8 6 4 - 8 2 g/dL    Albumin 3 0 (L) 3 5 - 5 0 g/dL    Total Bilirubin 0 63 0 20 - 1 00 mg/dL    eGFR 60 ml/min/1 73sq m   Hemoglobin A1c    Collection Time: 05/01/18 10:30 AM   Result Value Ref Range    Hemoglobin A1C 6 5 (H) 4 2 - 6 3 %     mg/dl   Lipid panel    Collection Time: 05/01/18 10:30 AM   Result Value Ref Range    Cholesterol 102 50 - 200 mg/dL    Triglycerides 49 <=150 mg/dL    HDL, Direct 56 40 - 60 mg/dL    LDL Calculated 36 0 - 100 mg/dL    Non-HDL-Chol (CHOL-HDL) 46 mg/dl   TSH, 3rd generation    Collection Time: 05/01/18 10:30 AM   Result Value Ref Range    TSH 3RD GENERATON 3 920 (H) 0 358 - 3 740 uIU/mL   Iron    Collection Time: 05/01/18 10:30 AM   Result Value Ref Range    Iron 24 (L) 65 - 175 ug/dL   Ferritin    Collection Time: 05/01/18 10:30 AM   Result Value Ref Range    Ferritin 33 8 - 388 ng/mL       The following portions of the patient's history were reviewed and updated as appropriate: allergies, current medications, past family history, past medical history, past social history, past surgical history and problem list      Review of Systems   Constitutional: Negative for appetite change, chills, diaphoresis, fatigue, fever and unexpected weight change  HENT: Negative for postnasal drip and sneezing  Eyes: Negative for visual disturbance  Respiratory: Positive for cough  Negative for chest tightness and shortness of breath  Cardiovascular: Negative for chest pain, palpitations and leg swelling  Gastrointestinal: Negative for abdominal pain and blood in stool  Endocrine: Negative for cold intolerance, heat intolerance, polydipsia, polyphagia and polyuria  Genitourinary: Negative for difficulty urinating, dysuria, frequency and urgency  Musculoskeletal: Negative for arthralgias and myalgias  Skin: Negative for rash and wound  Neurological: Negative for dizziness, weakness, light-headedness and headaches  Hematological: Negative for adenopathy  Psychiatric/Behavioral: Negative for confusion, dysphoric mood and sleep disturbance  The patient is not nervous/anxious  Objective:      Vitals:    05/03/18 1500   BP: 130/62   Pulse: 60   SpO2: 95%          Physical Exam   Constitutional: He is oriented to person, place, and time  He appears well-developed  No distress  HENT:   Head: Normocephalic and atraumatic  Nose: Nose normal    Mouth/Throat: Oropharynx is clear and moist    Eyes: Conjunctivae and EOM are normal  Pupils are equal, round, and reactive to light  Neck: Normal range of motion  Neck supple  No JVD present  No tracheal deviation present  No thyromegaly present  Cardiovascular: Normal rate and intact distal pulses  Exam reveals no gallop and no friction rub  Murmur heard  Pulmonary/Chest: Effort normal and breath sounds normal  No respiratory distress  He has no wheezes  He has no rales  Bibasilar crackles   Abdominal: Soft  Bowel sounds are normal  He exhibits no distension  There is no tenderness  Musculoskeletal: Normal range of motion  He exhibits edema  Lymphadenopathy:     He has no cervical adenopathy  Neurological: He is alert and oriented to person, place, and time  No cranial nerve deficit  Skin: Skin is warm and dry  No rash noted  He is not diaphoretic  Psychiatric: He has a normal mood and affect   His behavior is normal  Judgment and thought content normal

## 2018-05-12 DIAGNOSIS — E78.2 MIXED HYPERLIPIDEMIA: Primary | ICD-10-CM

## 2018-05-13 RX ORDER — SIMVASTATIN 20 MG
TABLET ORAL
Qty: 90 TABLET | Refills: 0 | Status: SHIPPED | OUTPATIENT
Start: 2018-05-13 | End: 2018-08-09 | Stop reason: SDUPTHER

## 2018-05-19 DIAGNOSIS — K21.9 GASTROESOPHAGEAL REFLUX DISEASE WITHOUT ESOPHAGITIS: Primary | ICD-10-CM

## 2018-05-20 RX ORDER — OMEPRAZOLE 20 MG/1
CAPSULE, DELAYED RELEASE ORAL
Qty: 90 CAPSULE | Refills: 3 | Status: SHIPPED | OUTPATIENT
Start: 2018-05-20

## 2018-08-09 DIAGNOSIS — E78.2 MIXED HYPERLIPIDEMIA: ICD-10-CM

## 2018-08-09 RX ORDER — SIMVASTATIN 20 MG
TABLET ORAL
Qty: 90 TABLET | Refills: 0 | Status: SHIPPED | OUTPATIENT
Start: 2018-08-09 | End: 2018-11-05 | Stop reason: SDUPTHER

## 2018-08-14 ENCOUNTER — TELEPHONE (OUTPATIENT)
Dept: INTERNAL MEDICINE CLINIC | Facility: CLINIC | Age: 83
End: 2018-08-14

## 2018-08-14 DIAGNOSIS — R91.1 PULMONARY NODULE: Primary | ICD-10-CM

## 2018-08-23 DIAGNOSIS — I10 ESSENTIAL HYPERTENSION: Primary | ICD-10-CM

## 2018-08-23 RX ORDER — METOPROLOL SUCCINATE 25 MG/1
TABLET, EXTENDED RELEASE ORAL
Qty: 90 TABLET | Refills: 3 | Status: SHIPPED | OUTPATIENT
Start: 2018-08-23

## 2018-09-24 DIAGNOSIS — I10 HYPERTENSION, UNSPECIFIED TYPE: ICD-10-CM

## 2018-09-24 RX ORDER — POTASSIUM CHLORIDE 1500 MG/1
TABLET, EXTENDED RELEASE ORAL
Qty: 180 TABLET | Refills: 0 | Status: SHIPPED | OUTPATIENT
Start: 2018-09-24 | End: 2018-12-23 | Stop reason: SDUPTHER

## 2018-10-10 DIAGNOSIS — I48.20 CHRONIC ATRIAL FIBRILLATION (HCC): ICD-10-CM

## 2018-10-10 RX ORDER — RIVAROXABAN 15 MG/1
TABLET, FILM COATED ORAL
Qty: 90 TABLET | Refills: 1 | Status: SHIPPED | OUTPATIENT
Start: 2018-10-10 | End: 2018-10-26 | Stop reason: SDUPTHER

## 2018-10-19 ENCOUNTER — HOSPITAL ENCOUNTER (OUTPATIENT)
Dept: CT IMAGING | Facility: HOSPITAL | Age: 83
Discharge: HOME | End: 2018-10-19
Attending: INTERNAL MEDICINE
Payer: MEDICARE

## 2018-10-19 ENCOUNTER — APPOINTMENT (OUTPATIENT)
Dept: LAB | Facility: CLINIC | Age: 83
End: 2018-10-19
Payer: MEDICARE

## 2018-10-19 DIAGNOSIS — E03.9 ACQUIRED HYPOTHYROIDISM: ICD-10-CM

## 2018-10-19 DIAGNOSIS — J44.9 CHRONIC OBSTRUCTIVE PULMONARY DISEASE, UNSPECIFIED COPD TYPE (HCC): ICD-10-CM

## 2018-10-19 DIAGNOSIS — R91.1 PULMONARY NODULE: ICD-10-CM

## 2018-10-19 DIAGNOSIS — E11.8 TYPE 2 DIABETES MELLITUS WITH COMPLICATION, WITHOUT LONG-TERM CURRENT USE OF INSULIN (HCC): Chronic | ICD-10-CM

## 2018-10-19 LAB
ALBUMIN SERPL BCP-MCNC: 2.8 G/DL (ref 3.5–5)
ALP SERPL-CCNC: 118 U/L (ref 46–116)
ALT SERPL W P-5'-P-CCNC: 16 U/L (ref 12–78)
ANION GAP SERPL CALCULATED.3IONS-SCNC: 5 MMOL/L (ref 4–13)
AST SERPL W P-5'-P-CCNC: 17 U/L (ref 5–45)
BASOPHILS # BLD AUTO: 0.07 THOUSANDS/ΜL (ref 0–0.1)
BASOPHILS NFR BLD AUTO: 1 % (ref 0–1)
BILIRUB SERPL-MCNC: 0.65 MG/DL (ref 0.2–1)
BUN SERPL-MCNC: 22 MG/DL (ref 5–25)
CALCIUM SERPL-MCNC: 8.9 MG/DL (ref 8.3–10.1)
CHLORIDE SERPL-SCNC: 106 MMOL/L (ref 100–108)
CHOLEST SERPL-MCNC: 115 MG/DL (ref 50–200)
CO2 SERPL-SCNC: 28 MMOL/L (ref 21–32)
CREAT SERPL-MCNC: 1.31 MG/DL (ref 0.6–1.3)
EOSINOPHIL # BLD AUTO: 0.11 THOUSAND/ΜL (ref 0–0.61)
EOSINOPHIL NFR BLD AUTO: 1 % (ref 0–6)
ERYTHROCYTE [DISTWIDTH] IN BLOOD BY AUTOMATED COUNT: 19.9 % (ref 11.6–15.1)
EST. AVERAGE GLUCOSE BLD GHB EST-MCNC: 143 MG/DL
GFR SERPL CREATININE-BSD FRML MDRD: 50 ML/MIN/1.73SQ M
GLUCOSE P FAST SERPL-MCNC: 97 MG/DL (ref 65–99)
HBA1C MFR BLD: 6.6 % (ref 4.2–6.3)
HCT VFR BLD AUTO: 44.5 % (ref 36.5–49.3)
HDLC SERPL-MCNC: 48 MG/DL (ref 40–60)
HGB BLD-MCNC: 12.8 G/DL (ref 12–17)
IMM GRANULOCYTES # BLD AUTO: 0.02 THOUSAND/UL (ref 0–0.2)
IMM GRANULOCYTES NFR BLD AUTO: 0 % (ref 0–2)
LDLC SERPL CALC-MCNC: 58 MG/DL (ref 0–100)
LYMPHOCYTES # BLD AUTO: 1.91 THOUSANDS/ΜL (ref 0.6–4.47)
LYMPHOCYTES NFR BLD AUTO: 22 % (ref 14–44)
MCH RBC QN AUTO: 21.9 PG (ref 26.8–34.3)
MCHC RBC AUTO-ENTMCNC: 28.8 G/DL (ref 31.4–37.4)
MCV RBC AUTO: 76 FL (ref 82–98)
MONOCYTES # BLD AUTO: 0.67 THOUSAND/ΜL (ref 0.17–1.22)
MONOCYTES NFR BLD AUTO: 8 % (ref 4–12)
NEUTROPHILS # BLD AUTO: 5.76 THOUSANDS/ΜL (ref 1.85–7.62)
NEUTS SEG NFR BLD AUTO: 68 % (ref 43–75)
NONHDLC SERPL-MCNC: 67 MG/DL
NRBC BLD AUTO-RTO: 0 /100 WBCS
PLATELET # BLD AUTO: 255 THOUSANDS/UL (ref 149–390)
PMV BLD AUTO: 9.8 FL (ref 8.9–12.7)
POTASSIUM SERPL-SCNC: 4.7 MMOL/L (ref 3.5–5.3)
PROT SERPL-MCNC: 7.8 G/DL (ref 6.4–8.2)
RBC # BLD AUTO: 5.84 MILLION/UL (ref 3.88–5.62)
SODIUM SERPL-SCNC: 139 MMOL/L (ref 136–145)
TRIGL SERPL-MCNC: 46 MG/DL
TSH SERPL DL<=0.05 MIU/L-ACNC: 4.78 UIU/ML (ref 0.36–3.74)
WBC # BLD AUTO: 8.54 THOUSAND/UL (ref 4.31–10.16)

## 2018-10-19 PROCEDURE — 84443 ASSAY THYROID STIM HORMONE: CPT

## 2018-10-19 PROCEDURE — 83036 HEMOGLOBIN GLYCOSYLATED A1C: CPT

## 2018-10-19 PROCEDURE — 36415 COLL VENOUS BLD VENIPUNCTURE: CPT

## 2018-10-19 PROCEDURE — 80061 LIPID PANEL: CPT

## 2018-10-19 PROCEDURE — 80053 COMPREHEN METABOLIC PANEL: CPT

## 2018-10-19 PROCEDURE — 71250 CT THORAX DX C-: CPT

## 2018-10-19 PROCEDURE — 85025 COMPLETE CBC W/AUTO DIFF WBC: CPT

## 2018-10-26 ENCOUNTER — OFFICE VISIT (OUTPATIENT)
Dept: INTERNAL MEDICINE CLINIC | Facility: CLINIC | Age: 83
End: 2018-10-26
Payer: MEDICARE

## 2018-10-26 VITALS
BODY MASS INDEX: 27.93 KG/M2 | HEIGHT: 69 IN | WEIGHT: 188.6 LBS | DIASTOLIC BLOOD PRESSURE: 60 MMHG | SYSTOLIC BLOOD PRESSURE: 118 MMHG | HEART RATE: 76 BPM

## 2018-10-26 DIAGNOSIS — R74.8 ELEVATED ALKALINE PHOSPHATASE LEVEL: ICD-10-CM

## 2018-10-26 DIAGNOSIS — N40.1 BENIGN PROSTATIC HYPERPLASIA WITH LOWER URINARY TRACT SYMPTOMS, SYMPTOM DETAILS UNSPECIFIED: Chronic | ICD-10-CM

## 2018-10-26 DIAGNOSIS — J44.9 CHRONIC OBSTRUCTIVE PULMONARY DISEASE, UNSPECIFIED COPD TYPE (HCC): Primary | ICD-10-CM

## 2018-10-26 DIAGNOSIS — I48.20 CHRONIC ATRIAL FIBRILLATION (HCC): Chronic | ICD-10-CM

## 2018-10-26 DIAGNOSIS — N18.9 CHRONIC KIDNEY DISEASE, UNSPECIFIED CKD STAGE: Chronic | ICD-10-CM

## 2018-10-26 DIAGNOSIS — E03.8 SUBCLINICAL HYPOTHYROIDISM: ICD-10-CM

## 2018-10-26 DIAGNOSIS — E11.8 TYPE 2 DIABETES MELLITUS WITH COMPLICATION, WITHOUT LONG-TERM CURRENT USE OF INSULIN (HCC): Chronic | ICD-10-CM

## 2018-10-26 PROCEDURE — 99214 OFFICE O/P EST MOD 30 MIN: CPT | Performed by: NURSE PRACTITIONER

## 2018-10-26 NOTE — PROGRESS NOTES
Assessment/Plan:    Patient Instructions   Hypertension stable on current medication    Hyperlipidemia LDL at goal on statin    Chronic renal insufficiency creatinine has a slight bump to 1 3 GFR 50  Increase hydration recheck 2 weeks    Type 2 diabetes mellitus under excellent control continue with lifestyle modifications    Venous insufficiency stable on potassium and Lasix recommend low-sodium diet elevating legs    BPH stable on tamsulosin    COPD encouraged to use nebulizer twice a day  Elevated alkaline phosphatase recheck in 4 months currently asymptomatic    Subclinical hypothyroidism continue to monitor no indication for medication    Follow-up 6 months sooner if needed    Encouraged eye exam    Skin lesion with history of cancer refer to Dermatology    AFib heart rate controlled on current medication continue Xarelto         Diagnoses and all orders for this visit:    Chronic obstructive pulmonary disease, unspecified COPD type ([facility] [address] [address] )    Chronic atrial fibrillation (HCC)    Chronic kidney disease, unspecified CKD stage  -     CBC and differential; Future  -     Comprehensive metabolic panel; Future  -     Basic metabolic panel; Future    Type 2 diabetes mellitus with complication, without long-term current use of insulin (HCC)  -     Lipid panel; Future  -     Hemoglobin A1C; Future  -     Microalbumin / creatinine urine ratio; Future    Subclinical hypothyroidism  -     TSH, 3rd generation with Free T4 reflex; Future    Elevated alkaline phosphatase level  -     Alkaline phosphatase, isoenzymes;  Future    Benign prostatic hyperplasia with lower urinary tract symptoms, symptom details unspecified         Subjective:      Patient ID: Di España is a 81 y o  male    Patient is here with his daughter today  No physical concerns  No home blood pressures  Does not check sugars  Admits that he eats a lot of cookies and pasta  No formal exercise  Wet nonproductive cough not using nebulizer  Declines flu shot            Current Outpatient Prescriptions:     fluticasone-salmeterol (ADVAIR DISKUS) 250-50 mcg/dose inhaler, Inhale, Disp: , Rfl:     furosemide (LASIX) 40 mg tablet, Take 1 tablet by mouth 2 (two) times a day, Disp: , Rfl:     Glucose Blood (FREESTYLE LITE TEST VI), by In Vitro route 2 (two) times a day, Disp: , Rfl:     ipratropium (ATROVENT) 0 02 % nebulizer solution, Take 2 5 mL by nebulization 3 (three) times a day, Disp: 75 mL, Rfl: 0    KLOR-CON M20 20 MEQ tablet, TAKE 2 TABLETS BY MOUTH DAILY, Disp: 180 tablet, Rfl: 0    Lancets (FREESTYLE) lancets, FreeStyle Lancets Miscellaneous TEST 1 QD  Quantity: 1;  Refills: 5   Degler M ROYCE , Randi Rose ;  Start 15-Ben-2016 Active 100 Miscellaneous Box, Disp: , Rfl:     Lancets (FREESTYLE) lancets, by Does not apply route daily, Disp: , Rfl:     levalbuterol (XOPENEX) 1 25 mg/0 5 mL nebulizer solution, Take 0 5 mL by nebulization 3 (three) times a day, Disp: 1 each, Rfl: 0    magnesium chloride-calcium (SLOW-MAG) 71 5-119 mg, Take by mouth, Disp: , Rfl:     metFORMIN (GLUCOPHAGE-XR) 500 mg 24 hr tablet, Take by mouth daily, Disp: , Rfl:     metoprolol succinate (TOPROL-XL) 25 mg 24 hr tablet, TAKE 1 TABLET BY MOUTH EVERY DAY, Disp: 90 tablet, Rfl: 3    omeprazole (PriLOSEC) 20 mg delayed release capsule, TAKE 1 CAPSULE EVERY DAY, Disp: 90 capsule, Rfl: 3    potassium chloride (KLOR-CON M20) 20 mEq tablet, Take 2 tablets by mouth daily, Disp: , Rfl:     rivaroxaban (XARELTO) 15 mg tablet, Take 1 tablet by mouth daily, Disp: , Rfl:     simvastatin (ZOCOR) 20 mg tablet, TAKE 1 TABLET BY MOUTH EVERY DAY, Disp: 90 tablet, Rfl: 0    tamsulosin (FLOMAX) 0 4 mg, Take 1 capsule by mouth daily, Disp: , Rfl:     Recent Results (from the past 1008 hour(s))   CBC and differential    Collection Time: 10/19/18  9:39 AM   Result Value Ref Range    WBC 8 54 4 31 - 10 16 Thousand/uL    RBC 5 84 (H) 3 88 - 5 62 Million/uL    Hemoglobin 12 8 12 0 - 17 0 g/dL Hematocrit 44 5 36 5 - 49 3 %    MCV 76 (L) 82 - 98 fL    MCH 21 9 (L) 26 8 - 34 3 pg    MCHC 28 8 (L) 31 4 - 37 4 g/dL    RDW 19 9 (H) 11 6 - 15 1 %    MPV 9 8 8 9 - 12 7 fL    Platelets 462 470 - 303 Thousands/uL    nRBC 0 /100 WBCs    Neutrophils Relative 68 43 - 75 %    Immat GRANS % 0 0 - 2 %    Lymphocytes Relative 22 14 - 44 %    Monocytes Relative 8 4 - 12 %    Eosinophils Relative 1 0 - 6 %    Basophils Relative 1 0 - 1 %    Neutrophils Absolute 5 76 1 85 - 7 62 Thousands/µL    Immature Grans Absolute 0 02 0 00 - 0 20 Thousand/uL    Lymphocytes Absolute 1 91 0 60 - 4 47 Thousands/µL    Monocytes Absolute 0 67 0 17 - 1 22 Thousand/µL    Eosinophils Absolute 0 11 0 00 - 0 61 Thousand/µL    Basophils Absolute 0 07 0 00 - 0 10 Thousands/µL   Comprehensive metabolic panel    Collection Time: 10/19/18  9:39 AM   Result Value Ref Range    Sodium 139 136 - 145 mmol/L    Potassium 4 7 3 5 - 5 3 mmol/L    Chloride 106 100 - 108 mmol/L    CO2 28 21 - 32 mmol/L    ANION GAP 5 4 - 13 mmol/L    BUN 22 5 - 25 mg/dL    Creatinine 1 31 (H) 0 60 - 1 30 mg/dL    Glucose, Fasting 97 65 - 99 mg/dL    Calcium 8 9 8 3 - 10 1 mg/dL    AST 17 5 - 45 U/L    ALT 16 12 - 78 U/L    Alkaline Phosphatase 118 (H) 46 - 116 U/L    Total Protein 7 8 6 4 - 8 2 g/dL    Albumin 2 8 (L) 3 5 - 5 0 g/dL    Total Bilirubin 0 65 0 20 - 1 00 mg/dL    eGFR 50 ml/min/1 73sq m   Lipid panel    Collection Time: 10/19/18  9:39 AM   Result Value Ref Range    Cholesterol 115 50 - 200 mg/dL    Triglycerides 46 <=150 mg/dL    HDL, Direct 48 40 - 60 mg/dL    LDL Calculated 58 0 - 100 mg/dL    Non-HDL-Chol (CHOL-HDL) 67 mg/dl   HEMOGLOBIN A1C W/ EAG ESTIMATION    Collection Time: 10/19/18  9:39 AM   Result Value Ref Range    Hemoglobin A1C 6 6 (H) 4 2 - 6 3 %     mg/dl   TSH, 3rd generation    Collection Time: 10/19/18  9:39 AM   Result Value Ref Range    TSH 3RD GENERATON 4 780 (H) 0 358 - 3 740 uIU/mL       The following portions of the patient's history were reviewed and updated as appropriate: allergies, current medications, past family history, past medical history, past social history, past surgical history and problem list      Review of Systems   Constitutional: Negative for appetite change, chills, diaphoresis, fatigue, fever and unexpected weight change  HENT: Negative for postnasal drip and sneezing  Eyes: Negative for visual disturbance  Respiratory: Negative for chest tightness and shortness of breath  Cardiovascular: Negative for chest pain, palpitations and leg swelling  Gastrointestinal: Negative for abdominal pain and blood in stool  Endocrine: Negative for cold intolerance, heat intolerance, polydipsia, polyphagia and polyuria  Genitourinary: Negative for difficulty urinating, dysuria, frequency and urgency  Musculoskeletal: Negative for arthralgias and myalgias  Skin: Negative for rash and wound  Neurological: Negative for dizziness, weakness, light-headedness and headaches  Hematological: Negative for adenopathy  Psychiatric/Behavioral: Negative for confusion, dysphoric mood and sleep disturbance  The patient is not nervous/anxious  Objective:      /60 (BP Location: Left arm, Patient Position: Sitting)   Pulse 76   Ht 5' 9" (1 753 m)   Wt 85 5 kg (188 lb 9 6 oz)   BMI 27 85 kg/m²        Physical Exam   Constitutional: He is oriented to person, place, and time  He appears well-developed  No distress  HENT:   Head: Normocephalic and atraumatic  Nose: Nose normal    Mouth/Throat: Oropharynx is clear and moist    Eyes: Pupils are equal, round, and reactive to light  Conjunctivae and EOM are normal    Neck: Normal range of motion  Neck supple  No JVD present  No tracheal deviation present  No thyromegaly present  Cardiovascular: Normal rate and intact distal pulses  Exam reveals no gallop and no friction rub  Murmur heard    Pulmonary/Chest: Effort normal and breath sounds normal  No respiratory distress  He has no wheezes  He has no rales  Bibasilar crackles   Abdominal: Soft  Bowel sounds are normal  He exhibits no distension  There is no tenderness  Musculoskeletal: Normal range of motion  He exhibits edema  Lymphadenopathy:     He has no cervical adenopathy  Neurological: He is alert and oriented to person, place, and time  No cranial nerve deficit  Skin: Skin is warm and dry  No rash noted  He is not diaphoretic  Psychiatric: He has a normal mood and affect   His behavior is normal  Judgment and thought content normal

## 2018-10-26 NOTE — PATIENT INSTRUCTIONS
Hypertension stable on current medication    Hyperlipidemia LDL at goal on statin    Chronic renal insufficiency creatinine has a slight bump to 1 3 GFR 50  Increase hydration recheck 2 weeks    Type 2 diabetes mellitus under excellent control continue with lifestyle modifications    Venous insufficiency stable on potassium and Lasix recommend low-sodium diet elevating legs    BPH stable on tamsulosin    COPD encouraged to use nebulizer twice a day      Elevated alkaline phosphatase recheck in 4 months currently asymptomatic    Subclinical hypothyroidism continue to monitor no indication for medication    Follow-up 6 months sooner if needed    Encouraged eye exam    Skin lesion with history of cancer refer to Dermatology    AFib heart rate controlled on current medication continue Xarelto

## 2018-11-05 DIAGNOSIS — E78.2 MIXED HYPERLIPIDEMIA: ICD-10-CM

## 2018-11-05 RX ORDER — SIMVASTATIN 20 MG
TABLET ORAL
Qty: 90 TABLET | Refills: 0 | Status: SHIPPED | OUTPATIENT
Start: 2018-11-05 | End: 2019-02-01 | Stop reason: SDUPTHER

## 2018-11-06 ENCOUNTER — APPOINTMENT (OUTPATIENT)
Dept: LAB | Facility: HOSPITAL | Age: 83
End: 2018-11-06
Payer: MEDICARE

## 2018-11-06 ENCOUNTER — OFFICE VISIT (OUTPATIENT)
Dept: DERMATOLOGY | Facility: CLINIC | Age: 83
End: 2018-11-06
Payer: MEDICARE

## 2018-11-06 DIAGNOSIS — L82.1 SEBORRHEIC KERATOSIS: ICD-10-CM

## 2018-11-06 DIAGNOSIS — Z13.89 SCREENING FOR SKIN CONDITION: ICD-10-CM

## 2018-11-06 DIAGNOSIS — I87.2 STASIS DERMATITIS OF BOTH LEGS: ICD-10-CM

## 2018-11-06 DIAGNOSIS — L98.9 UNKNOWN SKIN LESION: Primary | ICD-10-CM

## 2018-11-06 DIAGNOSIS — Z85.828 HISTORY OF SKIN CANCER: ICD-10-CM

## 2018-11-06 PROCEDURE — 11101 PR BIOPSY, EACH ADDED LESION: CPT | Performed by: DERMATOLOGY

## 2018-11-06 PROCEDURE — 88305 TISSUE EXAM BY PATHOLOGIST: CPT | Performed by: PATHOLOGY

## 2018-11-06 PROCEDURE — 99213 OFFICE O/P EST LOW 20 MIN: CPT | Performed by: DERMATOLOGY

## 2018-11-06 PROCEDURE — 11100 PR BIOPSY OF SKIN LESION: CPT | Performed by: DERMATOLOGY

## 2018-11-06 NOTE — PATIENT INSTRUCTIONS
Skin lesions possible basal cell carcinoma would require excision pending results of biopsy  Stasis dermatitis advised the importance that he needs to wear support stockings at all times  Seborrheic keratosis patient reassured these are normal growths we acquire with age no treatment needed  History of skin cancer in no recurrence nothing else atypical sunblock recommended follow-up in 6 months  Screening for dermatologic disorders nothing else of concern noted on complete exam follow-up in 6 months  Wound care instructions given to patient

## 2018-11-06 NOTE — PROGRESS NOTES
[facility] DERMATOLOGY  [facility] [address]  [facility] [address]  [phone]  [phone]     MRN: [id number] : 1935  Encounter: [id number]  Patient Information: Rashmi Ramirez  Chief complaint:  Skin lesion on left forearm overall checkup    History of present illness:  20-year-old male with history of multiple skin cancers in the past who had not seen since last January presents for overall checkup daughter was concerned regarding lesion on his left forearm not aware lesions we noted on his face patient also with history of stasis dermatitis not wearing his support stockings today  Past Medical History:   Diagnosis Date    Abdominal aortic aneurysm (AAA) ([facility] [address] )     Anticoagulant long-term use     Cardiac disease     [facility]est[facility] failure) ([facility])     Chronic kidney disease     COPD (chronic obstructive pulmonary disease) ([facility] [address] )     [facility]     Disorder of upper respiratory tract     GERD (gastroesophageal reflux disease)     Hyperlipidemia     Hypertension     Morbid (severe) obesity due to excess calories ([facility] )     Nonmelanoma skin cancer     last assessed 17     Osteoporosis     Parkinsons disease, secondary ([facility] )     Pulmonary nodules 2017    Renal disorder     Renal failure     last assessed 14    Renal failure     last assessed 14      Past Surgical History:   Procedure Laterality Date    CORONARY ANGIOPLASTY      CORONARY ANGIOPLASTY WITH STENT PLACEMENT      EYE SURGERY       Social History   History   Alcohol Use No     History   Drug Use No     History   Smoking Status    Former Smoker    Packs/day: 2 00    Quit date: 1964   Smokeless Tobacco    Never Used     Comment: non smoker tobacoo use per [employer]script      Family History   Problem Relation Age of Onset    Heart attack Mother         acute    Heart attack Father         acute    Emphysema Father     Hypertension Father Meds/Allergies   Allergies   Allergen Reactions    Cephalexin        Meds:  Prior to Admission medications    Medication Sig Start Date End Date Taking?  Authorizing Provider   fluticasone-salmeterol (ADVAIR DISKUS) 250-50 mcg/dose inhaler Inhale 10/20/14  Yes Historical Provider, MD   furosemide (LASIX) 40 mg tablet Take 1 tablet by mouth 2 (two) times a day 7/31/17  Yes Historical Provider, MD   Glucose Blood (FREESTYLE LITE TEST VI) by In Vitro route 2 (two) times a day 1/15/16  Yes Historical Provider, MD   ipratropium (ATROVENT) 0 02 % nebulizer solution Take 2 5 mL by nebulization 3 (three) times a day 12/4/17  Yes Kenyon Cuellar MD   KLOR-CON M20 20 MEQ tablet TAKE 2 TABLETS BY MOUTH DAILY 9/24/18  Yes Kenyon Cuellar MD   Lancets (FREESTYLE) lancets FreeStyle Lancets Miscellaneous  TEST 1 QD   Quantity: 1;  Refills: 5      David Dowling ;  Start 15-Ben-2016  Active  100 Miscellaneous Box 1/15/16  Yes Historical Provider, MD   Lancets (FREESTYLE) lancets by Does not apply route daily 1/15/16  Yes Historical Provider, MD   levalbuterol (XOPENEX) 1 25 mg/0 5 mL nebulizer solution Take 0 5 mL by nebulization 3 (three) times a day 12/4/17  Yes Kenyon Cuellar MD   magnesium chloride-calcium (SLOW-MAG) 71 5-119 mg Take by mouth 11/7/16  Yes Historical Provider, MD   metFORMIN (GLUCOPHAGE-XR) 500 mg 24 hr tablet Take by mouth daily 7/6/16  Yes Historical Provider, MD   metoprolol succinate (TOPROL-XL) 25 mg 24 hr tablet TAKE 1 TABLET BY MOUTH EVERY DAY 8/23/18  Yes Kenyon Cuellar MD   omeprazole (PriLOSEC) 20 mg delayed release capsule TAKE 1 CAPSULE EVERY DAY 5/20/18  Yes Kenyon Cuellar MD   potassium chloride (KLOR-CON M20) 20 mEq tablet Take 2 tablets by mouth daily 3/16/15  Yes Historical Provider, MD   rivaroxaban (XARELTO) 15 mg tablet Take 1 tablet by mouth daily 7/29/14  Yes Historical Provider, MD   simvastatin (ZOCOR) 20 mg tablet TAKE 1 TABLET BY MOUTH EVERY DAY 11/5/18  Yes Kenyon Cuellar, MD   tamsulosin (FLOMAX) 0 4 mg Take 1 capsule by mouth daily 10/9/14  Yes Historical Provider, MD       Subjective:     Review of Systems:    General: negative for - chills, fatigue, fever,  weight gain or weight loss  Psychological: negative for - anxiety, behavioral disorder, concentration difficulties, decreased libido, depression, irritability, memory difficulties, mood swings, sleep disturbances or suicidal ideation  ENT: negative for - hearing difficulties , nasal congestion, nasal discharge, oral lesions, sinus pain, sneezing, sore throat  Allergy and Immunology: negative for - hives, insect bite sensitivity,  Hematological and Lymphatic: negative for - bleeding problems, blood clots,bruising, swollen lymph nodes  Endocrine: negative for - hair pattern changes, hot flashes, malaise/lethargy, mood swings, palpitations, polydipsia/polyuria, skin changes, temperature intolerance or unexpected weight change  Respiratory: negative for - cough, hemoptysis, orthopnea, shortness of breath, or wheezing  Cardiovascular: negative for - chest pain, dyspnea on exertion, edema,  Gastrointestinal: negative for - abdominal pain, nausea/vomiting  Genito-Urinary: negative for - dysuria, incontinence, irregular/heavy menses or urinary frequency/urgency  Musculoskeletal: negative for - gait disturbance, joint pain, joint stiffness, joint swelling, muscle pain, muscular weakness  Dermatological:  As in HPI  Neurological: negative for confusion, dizziness, headaches, impaired coordination/balance, memory loss, numbness/tingling, seizures, speech problems, tremors or weakness       Objective: There were no vitals taken for this visit      Physical Exam:    General Appearance:    Alert, cooperative, no distress   Head:    Normocephalic, without obvious abnormality, atraumatic           Skin:   A full skin exam was performed including scalp, head scalp, eyes, ears, nose, lips, neck, chest, axilla, abdomen, back, buttocks, bilateral upper extremities, bilateral lower extremities, hands, feet, fingers, toes, fingernails, and toenails previous sites of skin cancer well healed without recurrence 6 mm erythematous keratotic area noted on the left malar eminence 4 mm pearly area in a linear array noted on the left lower cheek and a 1 5 cm pearly nodule noted on the left forearm with crusting noted normal keratotic papules with greasy stuck on appearance              Shave Biopsy Procedure Note    Pre-operative Diagnosis:  Basal cell carcinoma    Plan:  1  Instructed to keep the wound dry and covered for 24 and clean thereafter  2  Warning signs of infection were reviewed  3  Recommended that the patient use OTC acetaminophen as needed for pain  4  Return  Pending results of biopsy(ies)    Locations:  Left malar eminence left cheek and left forearm    Indications:  Suspicious lesions    Anesthesia: Lidocaine 1% without epinephrine without added sodium bicarbonate    Procedure Details     Patient informed of the risks (including bleeding and infection) and benefits of the   procedure and Verbal informed consent obtained  The lesion and surrounding area were given a sterile prep using alcohol and draped in the usual sterile fashion  A Blue blade razor was used to obtain a specimen  Hemostasis achieved with aluminum chloride  Petrolatum and a sterile dressing applied  The specimen was sent for pathologic examination  The patient tolerated the procedure(s) well  Complications:  none  Assessment:     1  Unknown skin lesion     2  History of skin cancer     3  Screening for skin condition     4  Seborrheic keratosis     5   Stasis dermatitis of both legs           Plan:   Skin lesions possible basal cell carcinoma would require excision pending results of biopsy  Stasis dermatitis advised the importance that he needs to wear support stockings at all times  Seborrheic keratosis patient reassured these are normal growths we acquire with age no treatment needed  History of skin cancer in no recurrence nothing else atypical sunblock recommended follow-up in 6 months  Screening for dermatologic disorders nothing else of concern noted on complete exam follow-up in 6 months    Deni Livingston MD  11/6/2018,11:03 AM    Portions of the record may have been created with voice recognition software   Occasional wrong word or "sound a like" substitutions may have occurred due to the inherent limitations of voice recognition software   Read the chart carefully and recognize, using context, where substitutions have occurred

## 2018-11-13 ENCOUNTER — TELEPHONE (OUTPATIENT)
Dept: DERMATOLOGY | Facility: CLINIC | Age: 83
End: 2018-11-13

## 2018-12-07 ENCOUNTER — TELEPHONE (OUTPATIENT)
Dept: INTERNAL MEDICINE CLINIC | Facility: CLINIC | Age: 83
End: 2018-12-07

## 2018-12-07 DIAGNOSIS — J44.9 CHRONIC OBSTRUCTIVE PULMONARY DISEASE, UNSPECIFIED COPD TYPE (HCC): Primary | ICD-10-CM

## 2018-12-07 NOTE — TELEPHONE ENCOUNTER
Pt is calling [facility] told her they sent over a request for Advair if we can resend it to them   he is running out of this [facility]

## 2018-12-12 ENCOUNTER — PROCEDURE VISIT (OUTPATIENT)
Dept: DERMATOLOGY | Facility: CLINIC | Age: 83
End: 2018-12-12
Payer: MEDICARE

## 2018-12-12 DIAGNOSIS — C44.629 SQUAMOUS CELL CANCER OF SKIN OF LEFT FOREARM: Primary | ICD-10-CM

## 2018-12-12 DIAGNOSIS — L57.0 ACTINIC KERATOSIS: ICD-10-CM

## 2018-12-12 PROCEDURE — 88305 TISSUE EXAM BY PATHOLOGIST: CPT | Performed by: PATHOLOGY

## 2018-12-12 PROCEDURE — 17000 DESTRUCT PREMALG LESION: CPT | Performed by: DERMATOLOGY

## 2018-12-12 PROCEDURE — 11603 EXC TR-EXT MAL+MARG 2.1-3 CM: CPT | Performed by: DERMATOLOGY

## 2018-12-12 PROCEDURE — 12032 INTMD RPR S/A/T/EXT 2.6-7.5: CPT | Performed by: DERMATOLOGY

## 2018-12-12 NOTE — PROGRESS NOTES
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 and basal cell carcinoma left cheek a a also actinic keratosis left malar check  Patient does not feel up to doing both lesions we elected just go ahead and do the forearm today     Objective: There were no vitals taken for this visit  Physical Exam:    General Appearance:    Alert, cooperative, no distress   Skin:   Previous site of biopsy noted    Procedure name: Excision with intermediate layered closure        Location:  Left forearm    Diagnosis: Squamous cell carcinoma    Size of lesion: 17 mm    Margins: 4 mm    Size + Margins: 25 mm    I explained the diagnosis to the patient and we recommend an excision of the lesion for diagnosis and/or treatment  Potential complications include, but are not limited to: scarring, bleeding, infection, incomplete removal, recurrence, and nerve damage  The risks, benefits, and alternatives were discussed with the patient in detail  The location of the tumor was identified, circled, and confirmed by the patient  The correct patient, site, and procedure were confirmed  Anesthesia: 1% xylocaine with 1:100,000 epinephrine 9 cc  The patient was brought into the room, prepped and draped sterilely in the usual manner and anesthesia was administered by local infiltration  A fusiform shape was drawn around the lesion, and the margins were incised to the level of the subcutaneous fat with a number 15cc Bard-Robert blade  The tissue was removed with sharp and blunt dissection  The lateral margins of the resulting defect were undermined with sharp and blunt dissection    Hemostasis was achieved with electrocautery  The deeper layers of the defect, including subcutaneous fat and fascia, had to be approximated to reduce tension on the suture line  Layered wound closure was performed  The wound was cleaned with saline, dried off, petroleum applied, and the wound was covered with a pressure dressing  The patient was given detailed verbal and written instructions on postoperative care  Suture for adipose/fascial/dermal layer closure: 4-0  vicryl 4 sutures interrupted    Suture used to approximate epidermis: 5-0  nylon 10 sutures interrupted    Final wound length: 5 0 cm    Follow-up in: 9 days  Patient tolerated procedure well without complications  Cryotherapy Procedure Note    Pre-operative Diagnosis: actinic keratosis    Plan:  1  Instructed to keep the area dry and clean thereafter  Apply petrolatum if area gets crusty  2  Recommended that the patient use acetaminophen  as needed for pain  Locations:  Left malar area of the cheek    Indications: Destruction of actinic keratosis previously biopsied x1    Patient informed of risks (permanent scarring, infection, light or dark discoloration, bleeding, infection, weakness, numbness and recurrence of the lesion) and benefits of the procedure and verbal informed consent obtained  The areas are treated with liquid nitrogen therapy, frozen until ice ball extended 2 mm beyond lesion, allowed to thaw, and treated again  The patient tolerated procedure well  The patient was instructed on post-op care, warned that there may be blister formation, redness and pain  Recommend OTC analgesia as needed for pain  Condition:  Stable    Complications:  none  Assessment:     1  Squamous cell cancer of skin of left forearm     2   Actinic keratosis           Plan:   Squamous cell cancer exists excised today appears more indurated I have a feeling that it will be more invasive await the results of biopsy for margin control  Actinic Keratosis: Patient advised lesions are precancers  These should resolve with cryosurgery if not to let us know sun block recommended  Plan on excision of the basal cell cancer in the future      Prior to Admission medications    Medication Sig Start Date End Date Taking?  Authorizing Provider   fluticasone-salmeterol (ADVAIR DISKUS) 250-50 mcg/dose inhaler Inhale 1 puff 2 (two) times a day for 30 days 12/7/18 1/6/19  Marky Goodwin MD   furosemide (LASIX) 40 mg tablet Take 1 tablet by mouth 2 (two) times a day 7/31/17   Historical Provider, MD   Glucose Blood (FREESTYLE LITE TEST VI) by In Vitro route 2 (two) times a day 1/15/16   Historical Provider, MD   ipratropium (ATROVENT) 0 02 % nebulizer solution Take 2 5 mL by nebulization 3 (three) times a day 12/4/17   Marky Goodwin MD   KLOR-CON M20 20 MEQ tablet TAKE 2 TABLETS BY MOUTH DAILY 9/24/18   Marky Goodwin MD   Lancets (FREESTYLE) lancets FreeStyle Lancets Miscellaneous  TEST 1 QD   Quantity: 1;  Refills: 5      Degler M D , [place] ;  Start 15-Ben-2016  Active  100 Miscellaneous Box 1/15/16   Historical Provider, MD   Lancets (FREESTYLE) lancets by Does not apply route daily 1/15/16   Historical Provider, MD   levalbuterol (XOPENEX) 1 25 mg/0 5 mL nebulizer solution Take 0 5 mL by nebulization 3 (three) times a day 12/4/17   Marky Goodwin MD   magnesium chloride-calcium (SLOW-MAG) 71 5-119 mg Take by mouth 11/7/16   Historical Provider, MD   metFORMIN (GLUCOPHAGE-XR) 500 mg 24 hr tablet Take by mouth daily 7/6/16   Historical Provider, MD   metoprolol succinate (TOPROL-XL) 25 mg 24 hr tablet TAKE 1 TABLET BY MOUTH EVERY DAY 8/23/18   Marky Goodwin MD   omeprazole (PriLOSEC) 20 mg delayed release capsule TAKE 1 CAPSULE EVERY DAY 5/20/18   Marky Goodwin MD   potassium chloride (KLOR-CON M20) 20 mEq tablet Take 2 tablets by mouth daily 3/16/15   Historical Provider, MD   rivaroxaban (XARELTO) 15 mg tablet Take 1 tablet by mouth daily 7/29/14   Historical Provider, MD   simvastatin (ZOCOR) 20 mg tablet TAKE 1 TABLET BY MOUTH EVERY DAY 11/5/18   Ehsan Sneed MD   tamsulosin [facility]) 0 4 mg Take 1 capsule by mouth daily 10/9/14   Historical Provider, MD     Allergies   Allergen Reactions    Cephalexin        Heaven Reeder MD  12/12/2018,12:04 PM    Portions of the record may have been created with voice recognition software   Occasional wrong word or "sound a like" substitutions may have occurred due to the inherent limitations of voice recognition software   Read the chart carefully and recognize, using context, where substitutions have occurred

## 2018-12-12 NOTE — PATIENT INSTRUCTIONS
Squamous cell cancer exists excised today appears more indurated I have a feeling that it will be more invasive await the results of biopsy for margin control  Actinic Keratosis:  Patient advised lesions are precancers  These should resolve with cryosurgery if not to let us know sun block recommended  Plan on excision of the basal cell cancer in the future  Wound care instructions given to patient  Suggested wearing a sling to keep the arm elevated above the level of the heart to prevent swelling  Treatment with Cryotherapy    The doctor has treated your skin with nitrogen, which is 320 degrees Fahrenheit below zero  He has given the treated area "frostbite "    Stinging should subside within a few hours  You can take Tylenol for pain, if needed  Over the next few days, it is normal if the area becomes reddened, a blood blister, or swollen with fluid  If the lesion treated was near the eye - you could get a swollen eye over the next few days  Do not panic! This is all temporary, and will resolve with time  There is no special treatment - just keep the area clean  Makeup and BandAids can be used, if preferred  When the area starts to dry up and peel off, using Vaseline can help healing  It usually takes up to a month for it to heal   Some lesions are recurrent and may require repeat treatments  If a lesion has not healed in one month, please don't hesitate to contact us  If you have any further questions that are not answered here, please call us  [phone]    Thank you for allowing us to care for you

## 2018-12-17 DIAGNOSIS — E11.9 TYPE 2 DIABETES MELLITUS WITHOUT COMPLICATION, WITHOUT LONG-TERM CURRENT USE OF INSULIN (HCC): ICD-10-CM

## 2018-12-17 RX ORDER — METFORMIN HYDROCHLORIDE 500 MG/1
TABLET, EXTENDED RELEASE ORAL
Qty: 90 TABLET | Refills: 3 | Status: SHIPPED | OUTPATIENT
Start: 2018-12-17 | End: 2019-04-24 | Stop reason: SDUPTHER

## 2018-12-21 ENCOUNTER — CLINICAL SUPPORT (OUTPATIENT)
Dept: DERMATOLOGY | Facility: CLINIC | Age: 83
End: 2018-12-21

## 2018-12-21 DIAGNOSIS — C44.319 BASAL CELL CARCINOMA (BCC) OF LEFT CHEEK: ICD-10-CM

## 2018-12-21 DIAGNOSIS — C44.629 SQUAMOUS CELL CANCER OF SKIN OF LEFT FOREARM: Primary | ICD-10-CM

## 2018-12-21 PROCEDURE — 99024 POSTOP FOLLOW-UP VISIT: CPT | Performed by: DERMATOLOGY

## 2018-12-21 NOTE — PROGRESS NOTES
[address] [facility] DERMATOLOGY  [address] [place] [address]  [phone]  [phone]     MRN: [id number] : 1935  Encounter: [id number]  Patient Information: Rae Duval    Subjective:     50-year-old male presents for planned suture removal from previous excised squamous cell carcinoma left forearm no problems noted     Objective: There were no vitals taken for this visit  Physical Exam:    General Appearance:    Alert, cooperative, no distress   Skin:   Previous site of excision healing  Procedure:  Suture removal  Site of excision:  Left forearm  Wound was cleansed with saline  Wound is intact  Sutures removed  Steri-Strips applied  Biopsy revealed squamous cell carcinoma margins clear     Assessment:     1  Squamous cell cancer of skin of left forearm     2  Basal cell carcinoma (BCC) of left cheek           Plan:   Previous excised skin cancer well-healed being lesion on the left cheek will plan on excision of this in the near future      Prior to Admission medications    Medication Sig Start Date End Date Taking?  Authorizing Provider   fluticasone-salmeterol (ADVAIR DISKUS) 250-50 mcg/dose inhaler Inhale 1 puff 2 (two) times a day for 30 days 18  Ehsan Sneed MD   furosemide (LASIX) 40 mg tablet Take 1 tablet by mouth 2 (two) times a day 17   Historical Provider, MD   Glucose Blood (FREESTYLE LITE TEST VI) by In Vitro route 2 (two) times a day 1/15/16   Historical Provider, MD   ipratropium (ATROVENT) 0 02 % nebulizer solution Take 2 5 mL by nebulization 3 (three) times a day 17   Ehsan Sneed MD   KLOR-CON M20 20 MEQ tablet TAKE 2 TABLETS BY MOUTH DAILY 18   Ehsan Sneed MD   Lancets (FREESTYLE) lancets FreeStyle Lancets Miscellaneous  TEST 1 QD   Quantity: 1;  Refills: 5      Degler Marisol PATINO ;  Start 15-Ben-2016  Active  100 Miscellaneous Box 1/15/16   Historical Provider, MD   Lancets (FREESTYLE) lancets by Does not apply route daily 1/15/16   Historical Provider, MD   levalbuterol (XOPENEX) 1 25 mg/0 5 mL nebulizer solution Take 0 5 mL by nebulization 3 (three) times a day 12/4/17   Cassidy Ragsdale MD   magnesium chloride-calcium (SLOW-MAG) 71 5-119 mg Take by mouth 11/7/16   Historical Provider, MD   metFORMIN (GLUCOPHAGE-XR) 500 mg 24 hr tablet Take by mouth daily 7/6/16   Historical Provider, MD   metFORMIN (GLUCOPHAGE-XR) 500 mg 24 hr tablet TAKE 1 TABLET BY MOUTH TWICE A DAY 12/17/18   Cassidy Ragsdale MD   metoprolol succinate (TOPROL-XL) 25 mg 24 hr tablet TAKE 1 TABLET BY MOUTH EVERY DAY 8/23/18   Cassidy Ragsdale MD   omeprazole (PriLOSEC) 20 mg delayed release capsule TAKE 1 CAPSULE EVERY DAY 5/20/18   Cassidy Ragsdale MD   potassium chloride (KLOR-CON M20) 20 mEq tablet Take 2 tablets by mouth daily 3/16/15   Historical Provider, MD   rivaroxaban (XARELTO) 15 mg tablet Take 1 tablet by mouth daily 7/29/14   Historical Provider, MD   simvastatin (ZOCOR) 20 mg tablet TAKE 1 TABLET BY MOUTH EVERY DAY 11/5/18   Cassidy Ragsdale MD   tamsulosin [facility]) 0 4 mg Take 1 capsule by mouth daily 10/9/14   Historical Provider, MD     Allergies   Allergen Reactions    Cephalexin        Paulina Mercer MD  12/21/2018,10:35 AM    Portions of the record may have been created with voice recognition software   Occasional wrong word or "sound a like" substitutions may have occurred due to the inherent limitations of voice recognition software   Read the chart carefully and recognize, using context, where substitutions have occurred

## 2018-12-21 NOTE — PATIENT INSTRUCTIONS
Previous excised skin cancer well-healed being lesion on the left cheek will plan on excision of this in the near future  STERI-STRIPS (BUTTERFLY) POST SURGERY        Steri-Strips have been placed over your incision site to give added support  Please continue to bathe the area as usual     Eventually the Steri-Strips will begin to peel off on the ends  Snip the raised ends off with scissors and let the rest fall off on their own  If you have any questions, please call our office   [phone]

## 2018-12-23 DIAGNOSIS — I10 HYPERTENSION, UNSPECIFIED TYPE: ICD-10-CM

## 2018-12-23 RX ORDER — POTASSIUM CHLORIDE 1500 MG/1
TABLET, EXTENDED RELEASE ORAL
Qty: 180 TABLET | Refills: 0 | Status: SHIPPED | OUTPATIENT
Start: 2018-12-23 | End: 2019-02-08 | Stop reason: HOSPADM

## 2019-01-09 DIAGNOSIS — N40.0 BENIGN PROSTATIC HYPERPLASIA, UNSPECIFIED WHETHER LOWER URINARY TRACT SYMPTOMS PRESENT: Primary | ICD-10-CM

## 2019-01-09 RX ORDER — TAMSULOSIN HYDROCHLORIDE 0.4 MG/1
CAPSULE ORAL DAILY
Qty: 90 CAPSULE | Refills: 3 | Status: SHIPPED | OUTPATIENT
Start: 2019-01-09

## 2019-01-23 ENCOUNTER — PROCEDURE VISIT (OUTPATIENT)
Dept: DERMATOLOGY | Facility: CLINIC | Age: 84
End: 2019-01-23
Payer: MEDICARE

## 2019-01-23 DIAGNOSIS — C44.319 BASAL CELL CARCINOMA (BCC) OF LEFT MEDIAL CHEEK: Primary | ICD-10-CM

## 2019-01-23 PROCEDURE — 11642 EXC F/E/E/N/L MAL+MRG 1.1-2: CPT | Performed by: DERMATOLOGY

## 2019-01-23 PROCEDURE — 88305 TISSUE EXAM BY PATHOLOGIST: CPT | Performed by: PATHOLOGY

## 2019-01-23 PROCEDURE — 12052 INTMD RPR FACE/MM 2.6-5.0 CM: CPT | Performed by: DERMATOLOGY

## 2019-01-23 NOTE — PROGRESS NOTES
[address] [facility] DERMATOLOGY  [address] [place] [address]  [phone]  [phone]     MRN: [id number] : 1935  Encounter: [id number]  Patient Information: Chelle Jasso    Subjective:     26-year-old male presents for planned excision of previously biopsied basal cell carcinoma left lower cheek     Objective: There were no vitals taken for this visit  Physical Exam:    General Appearance:    Alert, cooperative, no distress   Skin:   Previous site of biopsy noted    Procedure name: Excision with intermediate layered closure        Location:  Left medial cheek    Diagnosis: Basal cell carcinoma    Size of lesion: 8 mm    Margins: 4 mm    Size + Margins: 16 mm    I explained the diagnosis to the patient and we recommend an excision of the lesion for diagnosis and/or treatment  Potential complications include, but are not limited to: scarring, bleeding, infection, incomplete removal, recurrence, and nerve damage  The risks, benefits, and alternatives were discussed with the patient in detail  The location of the tumor was identified, circled, and confirmed by the patient  The correct patient, site, and procedure were confirmed  Anesthesia: 1% xylocaine with 1:100,000 epinephrine 9  cc  The patient was brought into the room, prepped and draped sterilely in the usual manner and anesthesia was administered by local infiltration  A fusiform shape was drawn around the lesion, and the margins were incised to the level of the subcutaneous fat with a number 15cc Bard-Robert blade  The tissue was removed with sharp and blunt dissection  The lateral margins of the resulting defect were undermined with sharp and blunt dissection  Hemostasis was achieved with electrocautery  The deeper layers of the defect, including subcutaneous fat and fascia, had to be approximated to reduce tension on the suture line  Layered wound closure was performed  The wound was cleaned with saline, dried off, petroleum applied, and the wound was covered with a pressure dressing  The patient was given detailed verbal and written instructions on postoperative care  Suture for adipose/fascial/dermal layer closure: 5-0  vicryl 3 sutures interrupted    Suture used to approximate epidermis: 6-0  nylon  9 sutures interrupted    Final wound length: 4 5 cm    Follow-up in: 7 days  Patient tolerated procedure well without complications  Assessment:     1  Basal cell carcinoma (BCC) of left medial cheek           Plan:   Wound care instructions given to patient        Prior to Admission medications    Medication Sig Start Date End Date Taking?  Authorizing Provider   fluticasone-salmeterol (ADVAIR DISKUS) 250-50 mcg/dose inhaler Inhale 1 puff 2 (two) times a day for 30 days 12/7/18 1/6/19  Sydni Hernandez MD   furosemide (LASIX) 40 mg tablet Take 1 tablet by mouth 2 (two) times a day 7/31/17   Historical Provider, MD   Glucose Blood (FREESTYLE LITE TEST VI) by In Vitro route 2 (two) times a day 1/15/16   Historical Provider, MD   ipratropium (ATROVENT) 0 02 % nebulizer solution Take 2 5 mL by nebulization 3 (three) times a day 12/4/17   Sydni Hernandez MD   KLOR-CON M20 20 MEQ tablet TAKE 2 TABLETS BY MOUTH DAILY 12/23/18   Sydni Hernandez MD   Lancets (FREESTYLE) lancets FreeStyle Lancets Miscellaneous  TEST 1 QD   Quantity: 1;  Refills: 5      Degler M D , [place] ;  Start 15-Ben-2016  Active  100 Miscellaneous Box 1/15/16   Historical Provider, MD   Lancets (FREESTYLE) lancets by Does not apply route daily 1/15/16   Historical Provider, MD   levalbuterol (XOPENEX) 1 25 mg/0 5 mL nebulizer solution Take 0 5 mL by nebulization 3 (three) times a day 12/4/17   Sydni Hernandez MD   magnesium chloride-calcium (SLOW-MAG) 71 5-119 mg Take by mouth 11/7/16   Historical Provider, MD   metFORMIN (GLUCOPHAGE-XR) 500 mg 24 hr tablet Take by mouth daily 7/6/16   Historical Provider, MD   metFORMIN (GLUCOPHAGE-XR) 500 mg 24 hr tablet TAKE 1 TABLET BY MOUTH TWICE A DAY 12/17/18   Belem Abraham MD   metoprolol succinate (TOPROL-XL) 25 mg 24 hr tablet TAKE 1 TABLET BY MOUTH EVERY DAY 8/23/18   Belem Abraham MD   omeprazole (PriLOSEC) 20 mg delayed release capsule TAKE 1 CAPSULE EVERY DAY 5/20/18   Belem Abraham MD   potassium chloride (KLOR-CON M20) 20 mEq tablet Take 2 tablets by mouth daily 3/16/15   Historical Provider, MD   rivaroxaban (XARELTO) 15 mg tablet Take 1 tablet by mouth daily 7/29/14   Historical Provider, MD   simvastatin (ZOCOR) 20 mg tablet TAKE 1 TABLET BY MOUTH EVERY DAY 11/5/18   Belem Abraham MD   tamsulosin (FLOMAX) 0 4 mg TAKE 1 CAPSULE BY MOUTH DAILY 1/9/19   Belem Abraham MD     Allergies   Allergen Reactions    Cephalexin        Tommy Redman MD  1/23/2019,10:08 AM    Portions of the record may have been created with voice recognition software   Occasional wrong word or "sound a like" substitutions may have occurred due to the inherent limitations of voice recognition software   Read the chart carefully and recognize, using context, where substitutions have occurred

## 2019-01-30 ENCOUNTER — CLINICAL SUPPORT (OUTPATIENT)
Dept: DERMATOLOGY | Facility: CLINIC | Age: 84
End: 2019-01-30

## 2019-01-30 DIAGNOSIS — C44.319 BASAL CELL CARCINOMA (BCC) OF LEFT MEDIAL CHEEK: Primary | ICD-10-CM

## 2019-01-30 PROCEDURE — 99024 POSTOP FOLLOW-UP VISIT: CPT | Performed by: DERMATOLOGY

## 2019-01-30 NOTE — PROGRESS NOTES
[address] [facility] DERMATOLOGY  [address]  [facility] [address]  [phone]  [phone]     MRN: [id number] : 1935  Encounter: [id number]  Patient Information: Rod Gore    Subjective:     81-year-old male presents for follow-up and planned suture removal from previous excised basal cell carcinoma left cheek     Objective: There were no vitals taken for this visit  Physical Exam:    General Appearance:    Alert, cooperative, no distress   Skin:   A previous site of excision noted  Procedure:  Suture removal  Site of excision:  Left cheek  Wound was cleansed with saline  Wound is intact  Sutures removed  Steri-Strips applied  Biopsy revealed basal cell carcinoma left cheek     Assessment:     1  Basal cell carcinoma (BCC) of left medial cheek           Plan:   Wound care instructions given to patient        Prior to Admission medications    Medication Sig Start Date End Date Taking?  Authorizing Provider   fluticasone-salmeterol (ADVAIR DISKUS) 250-50 mcg/dose inhaler Inhale 1 puff 2 (two) times a day for 30 days 18  Belem Abraham MD   furosemide (LASIX) 40 mg tablet Take 1 tablet by mouth 2 (two) times a day 17   Historical Provider, MD   Glucose Blood (FREESTYLE LITE TEST VI) by In Vitro route 2 (two) times a day 1/15/16   Historical Provider, MD   ipratropium (ATROVENT) 0 02 % nebulizer solution Take 2 5 mL by nebulization 3 (three) times a day 17   Belem Abraham MD   KLOR-CON M20 20 MEQ tablet TAKE 2 TABLETS BY MOUTH DAILY 18   Belem Abraham MD   Lancets (FREESTYLE) lancets FreeStyle Lancets Miscellaneous  TEST 1 QD   Quantity: 1;  Refills: 5      Degler M D , [place] ;  Start 15-Ben-2016  Active  100 Miscellaneous Box 1/15/16   Historical Provider, MD   Lancets (FREESTYLE) lancets by Does not apply route daily 1/15/16   Historical Provider, MD   levalbuterol (XOPENEX) 1 25 mg/0 5 mL nebulizer solution Take 0 5 mL by nebulization 3 (three) times a day 12/4/17   Wallace Chandler MD   magnesium chloride-calcium (SLOW-MAG) 71 5-119 mg Take by mouth 11/7/16   Historical Provider, MD   metFORMIN (GLUCOPHAGE-XR) 500 mg 24 hr tablet Take by mouth daily 7/6/16   Historical Provider, MD   metFORMIN (GLUCOPHAGE-XR) 500 mg 24 hr tablet TAKE 1 TABLET BY MOUTH TWICE A DAY 12/17/18   Wallace Chandler MD   metoprolol succinate (TOPROL-XL) 25 mg 24 hr tablet TAKE 1 TABLET BY MOUTH EVERY DAY 8/23/18   Wallace Chandler MD   omeprazole (PriLOSEC) 20 mg delayed release capsule TAKE 1 CAPSULE EVERY DAY 5/20/18   Wallace Chandler MD   potassium chloride (KLOR-CON M20) 20 mEq tablet Take 2 tablets by mouth daily 3/16/15   Historical Provider, MD   rivaroxaban (XARELTO) 15 mg tablet Take 1 tablet by mouth daily 7/29/14   Historical Provider, MD   simvastatin (ZOCOR) 20 mg tablet TAKE 1 TABLET BY MOUTH EVERY DAY 11/5/18   Wallace Chandler MD   tamsulosin (FLOMAX) 0 4 mg TAKE 1 CAPSULE BY MOUTH DAILY 1/9/19   Wallace Chandler MD     Allergies   Allergen Reactions    Cephalexin        Brandyn Villaseñor MD  1/30/2019,10:43 AM    Portions of the record may have been created with voice recognition software   Occasional wrong word or "sound a like" substitutions may have occurred due to the inherent limitations of voice recognition software   Read the chart carefully and recognize, using context, where substitutions have occurred

## 2019-01-30 NOTE — PATIENT INSTRUCTIONS
STERI-STRIPS (BUTTERFLY) POST SURGERY        Steri-Strips have been placed over your incision site to give added support  Please continue to bathe the area as usual     Eventually the Steri-Strips will begin to peel off on the ends  Snip the raised ends off with scissors and let the rest fall off on their own  If you have any questions, please call our office   [phone]

## 2019-02-01 DIAGNOSIS — E78.2 MIXED HYPERLIPIDEMIA: ICD-10-CM

## 2019-02-01 RX ORDER — SIMVASTATIN 20 MG
TABLET ORAL
Qty: 90 TABLET | Refills: 0 | Status: SHIPPED | OUTPATIENT
Start: 2019-02-01 | End: 2019-07-07 | Stop reason: SDUPTHER

## 2019-02-05 ENCOUNTER — HOSPITAL ENCOUNTER (INPATIENT)
Facility: HOSPITAL | Age: 84
LOS: 3 days | Discharge: NURSING, REHAB OR LONG-TERM CARE | DRG: 564 | End: 2019-02-08
Attending: EMERGENCY MEDICINE | Admitting: INTERNAL MEDICINE
Payer: MEDICARE

## 2019-02-05 ENCOUNTER — APPOINTMENT (EMERGENCY)
Dept: CT IMAGING | Facility: HOSPITAL | Age: 84
DRG: 564 | End: 2019-02-05
Payer: MEDICARE

## 2019-02-05 DIAGNOSIS — R53.1 WEAKNESS: ICD-10-CM

## 2019-02-05 DIAGNOSIS — I50.9 CONGESTIVE HEART FAILURE (CHF) (HCC): Primary | ICD-10-CM

## 2019-02-05 DIAGNOSIS — S22.21XA: ICD-10-CM

## 2019-02-05 DIAGNOSIS — I50.32 CHRONIC DIASTOLIC CONGESTIVE HEART FAILURE (HCC): ICD-10-CM

## 2019-02-05 DIAGNOSIS — L89.90 PRESSURE ULCER: ICD-10-CM

## 2019-02-05 DIAGNOSIS — I87.2 CHRONIC VENOUS STASIS DERMATITIS OF BOTH LOWER EXTREMITIES: ICD-10-CM

## 2019-02-05 PROBLEM — E11.628 TYPE 2 DIABETES MELLITUS WITH SKIN COMPLICATION, WITHOUT LONG-TERM CURRENT USE OF INSULIN (HCC): Status: ACTIVE | Noted: 2017-11-29

## 2019-02-05 PROBLEM — R26.2 AMBULATORY DYSFUNCTION: Status: ACTIVE | Noted: 2019-02-05

## 2019-02-05 LAB
ALBUMIN SERPL BCP-MCNC: 2.7 G/DL (ref 3.5–5)
ALP SERPL-CCNC: 110 U/L (ref 46–116)
ALT SERPL W P-5'-P-CCNC: 12 U/L (ref 12–78)
AMORPH URATE CRY URNS QL MICRO: ABNORMAL /HPF
ANION GAP SERPL CALCULATED.3IONS-SCNC: 11 MMOL/L (ref 4–13)
APTT PPP: 44 SECONDS (ref 26–38)
AST SERPL W P-5'-P-CCNC: 21 U/L (ref 5–45)
ATRIAL RATE: 93 BPM
BACTERIA UR QL AUTO: ABNORMAL /HPF
BASOPHILS # BLD AUTO: 0.06 THOUSANDS/ΜL (ref 0–0.1)
BASOPHILS NFR BLD AUTO: 1 % (ref 0–1)
BILIRUB SERPL-MCNC: 1.1 MG/DL (ref 0.2–1)
BILIRUB UR QL STRIP: NEGATIVE
BUN SERPL-MCNC: 17 MG/DL (ref 5–25)
CALCIUM SERPL-MCNC: 9.5 MG/DL (ref 8.3–10.1)
CHLORIDE SERPL-SCNC: 101 MMOL/L (ref 100–108)
CLARITY UR: CLEAR
CO2 SERPL-SCNC: 29 MMOL/L (ref 21–32)
COLOR UR: YELLOW
CREAT SERPL-MCNC: 1.48 MG/DL (ref 0.6–1.3)
EOSINOPHIL # BLD AUTO: 0.05 THOUSAND/ΜL (ref 0–0.61)
EOSINOPHIL NFR BLD AUTO: 1 % (ref 0–6)
ERYTHROCYTE [DISTWIDTH] IN BLOOD BY AUTOMATED COUNT: 19.7 % (ref 11.6–15.1)
GFR SERPL CREATININE-BSD FRML MDRD: 43 ML/MIN/1.73SQ M
GLUCOSE SERPL-MCNC: 117 MG/DL (ref 65–140)
GLUCOSE SERPL-MCNC: 146 MG/DL (ref 65–140)
GLUCOSE UR STRIP-MCNC: NEGATIVE MG/DL
HCT VFR BLD AUTO: 46.2 % (ref 36.5–49.3)
HGB BLD-MCNC: 13.5 G/DL (ref 12–17)
HGB UR QL STRIP.AUTO: ABNORMAL
IMM GRANULOCYTES # BLD AUTO: 0.03 THOUSAND/UL (ref 0–0.2)
IMM GRANULOCYTES NFR BLD AUTO: 0 % (ref 0–2)
INR PPP: 2.38 (ref 0.86–1.17)
KETONES UR STRIP-MCNC: NEGATIVE MG/DL
LACTATE SERPL-SCNC: 2.1 MMOL/L (ref 0.5–2)
LACTATE SERPL-SCNC: 2.6 MMOL/L (ref 0.5–2)
LEUKOCYTE ESTERASE UR QL STRIP: NEGATIVE
LYMPHOCYTES # BLD AUTO: 1.43 THOUSANDS/ΜL (ref 0.6–4.47)
LYMPHOCYTES NFR BLD AUTO: 16 % (ref 14–44)
MCH RBC QN AUTO: 21.6 PG (ref 26.8–34.3)
MCHC RBC AUTO-ENTMCNC: 29.2 G/DL (ref 31.4–37.4)
MCV RBC AUTO: 74 FL (ref 82–98)
MONOCYTES # BLD AUTO: 0.75 THOUSAND/ΜL (ref 0.17–1.22)
MONOCYTES NFR BLD AUTO: 8 % (ref 4–12)
NEUTROPHILS # BLD AUTO: 6.84 THOUSANDS/ΜL (ref 1.85–7.62)
NEUTS SEG NFR BLD AUTO: 74 % (ref 43–75)
NITRITE UR QL STRIP: NEGATIVE
NON-SQ EPI CELLS URNS QL MICRO: ABNORMAL /HPF
NRBC BLD AUTO-RTO: 0 /100 WBCS
NT-PROBNP SERPL-MCNC: 9169 PG/ML
P AXIS: 94 DEGREES
PH UR STRIP.AUTO: 6 [PH] (ref 4.5–8)
PLATELET # BLD AUTO: 230 THOUSANDS/UL (ref 149–390)
PMV BLD AUTO: 9.1 FL (ref 8.9–12.7)
POTASSIUM SERPL-SCNC: 4.8 MMOL/L (ref 3.5–5.3)
PR INTERVAL: 132 MS
PROCALCITONIN SERPL-MCNC: 0.1 NG/ML
PROT SERPL-MCNC: 8.3 G/DL (ref 6.4–8.2)
PROT UR STRIP-MCNC: ABNORMAL MG/DL
PROTHROMBIN TIME: 25.7 SECONDS (ref 11.8–14.2)
QRS AXIS: 83 DEGREES
QRSD INTERVAL: 140 MS
QT INTERVAL: 426 MS
QTC INTERVAL: 529 MS
RBC # BLD AUTO: 6.25 MILLION/UL (ref 3.88–5.62)
RBC #/AREA URNS AUTO: ABNORMAL /HPF
SODIUM SERPL-SCNC: 141 MMOL/L (ref 136–145)
SP GR UR STRIP.AUTO: 1.02 (ref 1–1.03)
T WAVE AXIS: 144 DEGREES
TROPONIN I SERPL-MCNC: 0.04 NG/ML
TSH SERPL DL<=0.05 MIU/L-ACNC: 5.77 UIU/ML (ref 0.36–3.74)
UROBILINOGEN UR QL STRIP.AUTO: 0.2 E.U./DL
VENTRICULAR RATE: 93 BPM
WBC # BLD AUTO: 9.16 THOUSAND/UL (ref 4.31–10.16)
WBC #/AREA URNS AUTO: ABNORMAL /HPF

## 2019-02-05 PROCEDURE — 93005 ELECTROCARDIOGRAM TRACING: CPT

## 2019-02-05 PROCEDURE — 83880 ASSAY OF NATRIURETIC PEPTIDE: CPT | Performed by: NURSE PRACTITIONER

## 2019-02-05 PROCEDURE — 85610 PROTHROMBIN TIME: CPT | Performed by: NURSE PRACTITIONER

## 2019-02-05 PROCEDURE — 83605 ASSAY OF LACTIC ACID: CPT | Performed by: NURSE PRACTITIONER

## 2019-02-05 PROCEDURE — 84484 ASSAY OF TROPONIN QUANT: CPT | Performed by: NURSE PRACTITIONER

## 2019-02-05 PROCEDURE — 36415 COLL VENOUS BLD VENIPUNCTURE: CPT | Performed by: NURSE PRACTITIONER

## 2019-02-05 PROCEDURE — 85025 COMPLETE CBC W/AUTO DIFF WBC: CPT | Performed by: NURSE PRACTITIONER

## 2019-02-05 PROCEDURE — 85730 THROMBOPLASTIN TIME PARTIAL: CPT | Performed by: NURSE PRACTITIONER

## 2019-02-05 PROCEDURE — 99285 EMERGENCY DEPT VISIT HI MDM: CPT

## 2019-02-05 PROCEDURE — 84443 ASSAY THYROID STIM HORMONE: CPT | Performed by: INTERNAL MEDICINE

## 2019-02-05 PROCEDURE — 94760 N-INVAS EAR/PLS OXIMETRY 1: CPT

## 2019-02-05 PROCEDURE — 84145 PROCALCITONIN (PCT): CPT | Performed by: NURSE PRACTITIONER

## 2019-02-05 PROCEDURE — 71250 CT THORAX DX C-: CPT

## 2019-02-05 PROCEDURE — 93010 ELECTROCARDIOGRAM REPORT: CPT | Performed by: INTERNAL MEDICINE

## 2019-02-05 PROCEDURE — 96360 HYDRATION IV INFUSION INIT: CPT

## 2019-02-05 PROCEDURE — 99223 1ST HOSP IP/OBS HIGH 75: CPT | Performed by: INTERNAL MEDICINE

## 2019-02-05 PROCEDURE — 80053 COMPREHEN METABOLIC PANEL: CPT | Performed by: NURSE PRACTITIONER

## 2019-02-05 PROCEDURE — 82948 REAGENT STRIP/BLOOD GLUCOSE: CPT

## 2019-02-05 PROCEDURE — 81001 URINALYSIS AUTO W/SCOPE: CPT | Performed by: NURSE PRACTITIONER

## 2019-02-05 PROCEDURE — 83605 ASSAY OF LACTIC ACID: CPT | Performed by: INTERNAL MEDICINE

## 2019-02-05 RX ORDER — PRAVASTATIN SODIUM 40 MG
40 TABLET ORAL
Status: DISCONTINUED | OUTPATIENT
Start: 2019-02-05 | End: 2019-02-08 | Stop reason: HOSPADM

## 2019-02-05 RX ORDER — PANTOPRAZOLE SODIUM 40 MG/1
40 TABLET, DELAYED RELEASE ORAL
Status: DISCONTINUED | OUTPATIENT
Start: 2019-02-06 | End: 2019-02-08 | Stop reason: HOSPADM

## 2019-02-05 RX ORDER — POTASSIUM CHLORIDE 20 MEQ/1
40 TABLET, EXTENDED RELEASE ORAL DAILY
Status: DISCONTINUED | OUTPATIENT
Start: 2019-02-05 | End: 2019-02-07

## 2019-02-05 RX ORDER — ALBUTEROL SULFATE 2.5 MG/3ML
2.5 SOLUTION RESPIRATORY (INHALATION) EVERY 4 HOURS PRN
Status: DISCONTINUED | OUTPATIENT
Start: 2019-02-05 | End: 2019-02-08 | Stop reason: HOSPADM

## 2019-02-05 RX ORDER — ACETAMINOPHEN 325 MG/1
650 TABLET ORAL EVERY 6 HOURS PRN
Status: DISCONTINUED | OUTPATIENT
Start: 2019-02-05 | End: 2019-02-08 | Stop reason: HOSPADM

## 2019-02-05 RX ORDER — FUROSEMIDE 40 MG/1
40 TABLET ORAL 2 TIMES DAILY
Status: DISCONTINUED | OUTPATIENT
Start: 2019-02-05 | End: 2019-02-07

## 2019-02-05 RX ORDER — TAMSULOSIN HYDROCHLORIDE 0.4 MG/1
0.4 CAPSULE ORAL
Status: DISCONTINUED | OUTPATIENT
Start: 2019-02-05 | End: 2019-02-08 | Stop reason: HOSPADM

## 2019-02-05 RX ORDER — METOPROLOL SUCCINATE 25 MG/1
25 TABLET, EXTENDED RELEASE ORAL DAILY
Status: DISCONTINUED | OUTPATIENT
Start: 2019-02-05 | End: 2019-02-08 | Stop reason: HOSPADM

## 2019-02-05 RX ORDER — FLUTICASONE FUROATE AND VILANTEROL 200; 25 UG/1; UG/1
1 POWDER RESPIRATORY (INHALATION)
Status: DISCONTINUED | OUTPATIENT
Start: 2019-02-05 | End: 2019-02-08 | Stop reason: HOSPADM

## 2019-02-05 RX ORDER — LEVALBUTEROL 1.25 MG/.5ML
1.25 SOLUTION, CONCENTRATE RESPIRATORY (INHALATION)
Status: DISCONTINUED | OUTPATIENT
Start: 2019-02-05 | End: 2019-02-05

## 2019-02-05 RX ADMIN — FLUTICASONE FUROATE AND VILANTEROL TRIFENATATE 1 PUFF: 200; 25 POWDER RESPIRATORY (INHALATION) at 16:30

## 2019-02-05 RX ADMIN — SODIUM CHLORIDE 1000 ML: 0.9 INJECTION, SOLUTION INTRAVENOUS at 11:11

## 2019-02-05 RX ADMIN — METOPROLOL SUCCINATE 25 MG: 25 TABLET, EXTENDED RELEASE ORAL at 16:29

## 2019-02-05 RX ADMIN — FUROSEMIDE 40 MG: 40 TABLET ORAL at 18:17

## 2019-02-05 RX ADMIN — RIVAROXABAN 15 MG: 15 TABLET, FILM COATED ORAL at 16:34

## 2019-02-05 RX ADMIN — PRAVASTATIN SODIUM 40 MG: 40 TABLET ORAL at 16:30

## 2019-02-05 RX ADMIN — POTASSIUM CHLORIDE 40 MEQ: 1500 TABLET, EXTENDED RELEASE ORAL at 16:29

## 2019-02-05 RX ADMIN — TAMSULOSIN HYDROCHLORIDE 0.4 MG: 0.4 CAPSULE ORAL at 16:29

## 2019-02-05 NOTE — ASSESSMENT & PLAN NOTE
Lab Results   Component Value Date    HGBA1C 6 6 (H) 10/19/2018       No results for input(s): POCGLU in the last 72 hours  Blood Sugar Average: Last 72 hrs:  · Hold metformin    Accu-Cheks a c  HS with correctional scale insulin  · Check hemoglobin A1c

## 2019-02-05 NOTE — ASSESSMENT & PLAN NOTE
· Has history of venous stasis dermatitis bilateral extremity presenting with worsening lesion with weeping, greenish discharge and foul odor  · No definite evidence of cellulitis at this time    Patient is afebrile and without leukocytosis  · Clean wounds and apply Ace wraps  · Obtain podiatry evaluation for further management of lower extremity dermatitis

## 2019-02-05 NOTE — ED PROVIDER NOTES
History  Chief Complaint   Patient presents with    Pain     Pain in L chest area after fall yesterday, landed on "butt"  "tried to get up and felt a tight pull"  still hurting on and off     81-year-old male patient presents here with his daughter with reports of weakness  He reports that yesterday he had a mechanical fall tripped over his own feet and fell onto his buttocks  He denies injury as result of that  This morning he required his daughter's assistance to help him get up any could not stand while he was trying to stand his chest was bothering him  His daughter is unsure if he strained his chest from attempting to get up yesterday after his fall  He has extensive history of congestive heart failure COPD he has severe peripheral stasis dermatitis  He smells of urine  He is noted to be incontinent  Differentials include anything that could cause weakness congestive heart failure COPD urinary tract infection or other infectious process  Prior to Admission Medications   Prescriptions Last Dose Informant Patient Reported? Taking?    Glucose Blood (FREESTYLE LITE TEST VI)  Self Yes No   Sig: by In Vitro route 2 (two) times a day   KLOR-CON M20 20 MEQ tablet   No No   Sig: TAKE 2 TABLETS BY MOUTH DAILY   Lancets (FREESTYLE) lancets  Self Yes No   Sig: FreeStyle Lancets Miscellaneous  TEST 1 QD   Quantity: 1;  Refills: 5      Degler M D , Dejah Oconnell ;  Start 15-Ben-2016  Active  100 Miscellaneous Box   Lancets (FREESTYLE) lancets  Self Yes No   Sig: by Does not apply route daily   fluticasone-salmeterol (ADVAIR DISKUS) 250-50 mcg/dose inhaler   No No   Sig: Inhale 1 puff 2 (two) times a day for 30 days   furosemide (LASIX) 40 mg tablet  Self Yes No   Sig: Take 1 tablet by mouth 2 (two) times a day   ipratropium (ATROVENT) 0 02 % nebulizer solution  Self No No   Sig: Take 2 5 mL by nebulization 3 (three) times a day   levalbuterol (XOPENEX) 1 25 mg/0 5 mL nebulizer solution  Self No No   Sig: Take 0 5 mL by nebulization 3 (three) times a day   magnesium chloride-calcium (SLOW-MAG) 71 5-119 mg  Self Yes No   Sig: Take by mouth   metFORMIN (GLUCOPHAGE-XR) 500 mg 24 hr tablet  Self Yes No   Sig: Take by mouth daily   metFORMIN (GLUCOPHAGE-XR) 500 mg 24 hr tablet   No No   Sig: TAKE 1 TABLET BY MOUTH TWICE A DAY   metoprolol succinate (TOPROL-XL) 25 mg 24 hr tablet  Self No No   Sig: TAKE 1 TABLET BY MOUTH EVERY DAY   omeprazole (PriLOSEC) 20 mg delayed release capsule  Self No No   Sig: TAKE 1 CAPSULE EVERY DAY   potassium chloride (KLOR-CON M20) 20 mEq tablet  Self Yes No   Sig: Take 2 tablets by mouth daily   rivaroxaban (XARELTO) 15 mg tablet  Self Yes No   Sig: Take 1 tablet by mouth daily   simvastatin (ZOCOR) 20 mg tablet   No No   Sig: TAKE 1 TABLET BY MOUTH EVERY DAY   tamsulosin (FLOMAX) 0 4 mg   No No   Sig: TAKE 1 CAPSULE BY MOUTH DAILY      Facility-Administered Medications: None       Past Medical History:   Diagnosis Date    Abdominal aortic aneurysm (AAA) (HCC)     Anticoagulant long-term use     Cardiac disease     CHF (congestive heart failure) (HCC)     Chronic kidney disease     COPD (chronic obstructive pulmonary disease) (HCC)     Diabetes mellitus (HCC)     Disorder of upper respiratory tract     GERD (gastroesophageal reflux disease)     Hyperlipidemia     Hypertension     Morbid (severe) obesity due to excess calories (HCC)     Nonmelanoma skin cancer     last assessed 6/1/17     Osteoporosis     Parkinsons disease, secondary ([facility] )     Pulmonary nodules 12/1/2017    Renal disorder     Renal failure     last assessed 2/28/14    Renal failure     last assessed 2/28/14        Past Surgical History:   Procedure Laterality Date    CORONARY ANGIOPLASTY      CORONARY ANGIOPLASTY WITH STENT PLACEMENT      EYE SURGERY         Family History   Problem Relation Age of Onset    Heart attack Mother         acute    Heart attack Father         acute    Emphysema Father    [facility] Hypertension Father      I have reviewed and agree with the history as documented  Social History   Substance Use Topics    Smoking status: Former Smoker     Packs/day: 2 00     Quit date: 11/29/1964    Smokeless tobacco: Never Used      Comment: non smoker tobacoo use per allscript     Alcohol use No        Review of Systems   Constitutional: Negative for diaphoresis, fatigue and fever  HENT: Negative for congestion, ear pain, nosebleeds and sore throat  Eyes: Negative for photophobia, pain, discharge and visual disturbance  Respiratory: Negative for cough, choking, chest tightness, shortness of breath and wheezing  Cardiovascular: Positive for leg swelling  Negative for chest pain and palpitations  Gastrointestinal: Negative for abdominal distention, abdominal pain, diarrhea and vomiting  Genitourinary: Negative for dysuria, flank pain and frequency  Musculoskeletal: Negative for back pain, gait problem and joint swelling  Skin: Negative for color change and rash  Neurological: Positive for weakness  Negative for dizziness, syncope and headaches  Psychiatric/Behavioral: Negative for behavioral problems and confusion  The patient is not nervous/anxious  All other systems reviewed and are negative  Physical Exam  Physical Exam   Constitutional: He is oriented to person, place, and time  He appears well-developed and well-nourished  HENT:   Head: Normocephalic and atraumatic  Eyes: Pupils are equal, round, and reactive to light  Neck: Normal range of motion  Neck supple  Cardiovascular: Normal rate, regular rhythm, normal heart sounds and normal pulses  PMI is not displaced  Pulmonary/Chest: Effort normal  No respiratory distress  He has decreased breath sounds  He has wheezes  He has rales  Abdominal: Soft  He exhibits no distension  There is no guarding  Musculoskeletal: Normal range of motion   He exhibits edema (Severe pitting stasis dermatitis of both lower extremities  )  Lymphadenopathy:     He has no cervical adenopathy  Neurological: He is alert and oriented to person, place, and time  Skin: Skin is warm and dry  No rash noted  He is not diaphoretic  No pallor  Psychiatric: He has a normal mood and affect  Vitals reviewed        Vital Signs  ED Triage Vitals [02/05/19 1030]   Temperature Pulse Respirations Blood Pressure SpO2   97 6 °F (36 4 °C) 95 (!) 26 146/86 94 %      Temp Source Heart Rate Source Patient Position - Orthostatic VS BP Location FiO2 (%)   Oral Monitor Sitting Right arm --      Pain Score       2           Vitals:    02/05/19 1030   BP: 146/86   Pulse: 95   Patient Position - Orthostatic VS: Sitting       Visual Acuity      ED Medications  Medications   sodium chloride 0 9 % bolus 1,000 mL (0 mL Intravenous Stopped 2/5/19 1357)       Diagnostic Studies  Results Reviewed     Procedure Component Value Units Date/Time    Procalcitonin [[id number]]     Lab Status:  No result Specimen:  Blood     Urine Microscopic [[id number]]  (Abnormal) Collected:  02/05/19 1156    Lab Status:  Final result Specimen:  Urine from Urine, Straight Cath Updated:  02/05/19 1221     RBC, UA 1-2 (A) /hpf      WBC, UA 0-1 (A) /hpf      Epithelial Cells Occasional /hpf      Bacteria, UA None Seen /hpf      AMORPH URATES Occasional /hpf     UA w Reflex to Microscopic w Reflex to Culture [[id number]]  (Abnormal) Collected:  02/05/19 1156    Lab Status:  Final result Specimen:  Urine from Urine, Straight Cath Updated:  02/05/19 1210     Color, UA Yellow     Clarity, UA Clear     Specific Gravity, UA 1 025     pH, UA 6 0     Leukocytes, UA Negative     Nitrite, UA Negative     Protein, UA 30 (1+) (A) mg/dl      Glucose, UA Negative mg/dl      Ketones, UA Negative mg/dl      Urobilinogen, UA 0 2 E U /dl      Bilirubin, UA Negative     Blood, UA Small (A)    Lactic acid, plasma [[id number]]  (Abnormal) Collected:  02/05/19 1107    Lab Status:  Final result Specimen:  Blood from Arm, Right Updated:  02/05/19 1151     LACTIC ACID 2 6 (HH) mmol/L     Narrative:         Result may be elevated if tourniquet was used during collection  B-type natriuretic peptide [[id number]]  (Abnormal) Collected:  02/05/19 1107    Lab Status:  Final result Specimen:  Blood from Arm, Right Updated:  02/05/19 1145     NT-proBNP 9,169 (H) pg/mL     Troponin I [[id number]]  (Normal) Collected:  02/05/19 1107    Lab Status:  Final result Specimen:  Blood from Arm, Right Updated:  02/05/19 1144     Troponin I 0 04 ng/mL     Comprehensive metabolic panel [[id number]]  (Abnormal) Collected:  02/05/19 1107    Lab Status:  Final result Specimen:  Blood from Arm, Right Updated:  02/05/19 1139     Sodium 141 mmol/L      Potassium 4 8 mmol/L      Chloride 101 mmol/L      CO2 29 mmol/L      ANION GAP 11 mmol/L      BUN 17 mg/dL      Creatinine 1 48 (H) mg/dL      Glucose 117 mg/dL      Calcium 9 5 mg/dL      AST 21 U/L      ALT 12 U/L      Alkaline Phosphatase 110 U/L      Total Protein 8 3 (H) g/dL      Albumin 2 7 (L) g/dL      Total Bilirubin 1 10 (H) mg/dL      eGFR 43 ml/min/1 73sq m     Narrative:         National Kidney Disease Education Program recommendations are as follows:  GFR calculation is accurate only with a steady state creatinine  Chronic Kidney disease less than 60 ml/min/1 73 sq  meters  Kidney failure less than 15 ml/min/1 73 sq  meters      Protime-INR [[id number]]  (Abnormal) Collected:  02/05/19 1107    Lab Status:  Final result Specimen:  Blood from Arm, Right Updated:  02/05/19 1131     Protime 25 7 (H) seconds      INR 2 38 (H)    APTT [[id number]]  (Abnormal) Collected:  02/05/19 1107    Lab Status:  Final result Specimen:  Blood from Arm, Right Updated:  02/05/19 1131     PTT 44 (H) seconds     CBC and differential [[id number]]  (Abnormal) Collected:  02/05/19 1107    Lab Status:  Final result Specimen:  Blood from Arm, Right Updated:  02/05/19 1115     WBC 9 16 Thousand/uL      RBC 6 25 (H) Million/uL      Hemoglobin 13 5 g/dL      Hematocrit 46 2 %      MCV 74 (L) fL      MCH 21 6 (L) pg      MCHC 29 2 (L) g/dL      RDW 19 7 (H) %      MPV 9 1 fL      Platelets 525 Thousands/uL      nRBC 0 /100 WBCs      Neutrophils Relative 74 %      Immat GRANS % 0 %      Lymphocytes Relative 16 %      Monocytes Relative 8 %      Eosinophils Relative 1 %      Basophils Relative 1 %      Neutrophils Absolute 6 84 Thousands/µL      Immature Grans Absolute 0 03 Thousand/uL      Lymphocytes Absolute 1 43 Thousands/µL      Monocytes Absolute 0 75 Thousand/µL      Eosinophils Absolute 0 05 Thousand/µL      Basophils Absolute 0 06 Thousands/µL                  CT chest without contrast   Final Result by Brittany Gardner MD (02/05 1205)      1  Acute fracture of the manubrium  No acute rib fractures  2   Patchy density in the left lower lobe, atelectasis versus pneumonia  3   Paraseptal and centrilobular emphysema  The study was marked in [facility] for immediate notification  Workstation performed: [id number]                    Procedures  ECG 12 Lead Documentation  Date/Time: 2/5/2019 10:36 AM  Performed by: Jay Cabral  Authorized by: Jay Cabarl     Indications / Diagnosis:  Edema  ECG reviewed by me, the ED Provider: yes    Patient location:  ED  Previous ECG:     Previous ECG:  Compared to current    Similarity:  No change  Interpretation:     Interpretation: abnormal    Rate:     ECG rate:  93    ECG rate assessment: normal    Rhythm:     Rhythm: atrial flutter    Ectopy:     Ectopy: PVCs    QRS:     QRS axis:  Normal  ST segments:     ST segments:  Abnormal    Elevation:  V4, V5 and V6  T waves:     T waves: inverted             Phone Contacts  ED Phone Contact    ED Course           Identification of Seniors at Risk      Most Recent Value   (ISAR) Identification of Seniors at Risk   Before the illness or injury that brought you to the Emergency, did you need someone to help you on a regular basis? 1 Filed at: 02/05/2019 1042   In the last 24 hours, have you needed more help than usual?  0 Filed at: 02/05/2019 1042   Have you been hospitalized for one or more nights during the past 6 months? 0 Filed at: 02/05/2019 1042   In general, do you see well?  0 Filed at: 02/05/2019 1042   In general, do you have serious problems with your memory? 0 Filed at: 02/05/2019 1042   Do you take more than three different medications every day? 1 Filed at: 02/05/2019 1042   ISAR Score  2 Filed at: 02/05/2019 1042                          MDM  Number of Diagnoses or Management Options  Congestive heart failure (CHF) ([facility] ): new and requires workup  Fracture of manubrium: new and requires workup  Weakness: new and requires workup  Diagnosis management comments: At this point patient's presentation is consistent with acute on chronic congestive heart failure  Follow there is infiltrate on x-ray it does not seem consistent with pneumonia  He has had progressive fluid retention and progressive weakness  Manubrium fracture likely related to recent fall probably the attempt to get up         Amount and/or Complexity of Data Reviewed  Clinical lab tests: reviewed and ordered  Tests in the radiology section of CPT®: reviewed and ordered  Tests in the medicine section of CPT®: reviewed and ordered  Review and summarize past medical records: yes  Independent visualization of images, tracings, or specimens: yes        Disposition  Final diagnoses:   Congestive heart failure (CHF) ([facility] )   Fracture of manubrium   Weakness     Time reflects when diagnosis was documented in both MDM as applicable and the Disposition within this note     Time User Action Codes Description Comment    2/5/2019 12:35 PM Ashia Haley Add [I50 9] Congestive heart failure (CHF) ([facility] )     2/5/2019 12:35 PM Ashia Haley Remove [I50 9] Congestive heart failure (CHF) ([facility] )     2/5/2019 12:35 PM Ashia Tera Add [I50 9] Congestive heart failure (CHF) ([facility] ) 2/5/2019 12:36 PM Guido Donahue Add [S22 21XA] Fracture of manubrium     2/5/2019 12:37 PM Guido Donahue Add [R53 1] Weakness     2/5/2019  1:26 PM Massimo Prince A Add [I87 2] Chronic 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     2/5/2019  1:26 PM Massimo Prince A Modify [I87 2] Chronic 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       ED Disposition     ED Disposition Condition Date/Time Comment    Admit  Tue Feb 5, 2019 12:35 PM Case was discussed with Zainab Lamb  and the patient's admission status was agreed to be Admission Status: inpatient status to the service of Dr Zainab Lamb   Follow-up Information    None         Current Discharge Medication List      CONTINUE these medications which have NOT CHANGED    Details   fluticasone-salmeterol (ADVAIR DISKUS) 250-50 mcg/dose inhaler Inhale 1 puff 2 (two) times a day for 30 days  Qty: 5 Inhaler, Refills: 2    Associated Diagnoses: Chronic obstructive pulmonary disease, unspecified COPD type ([address] )      furosemide (LASIX) 40 mg tablet Take 1 tablet by mouth 2 (two) times a day      Glucose Blood (FREESTYLE LITE TEST VI) by In Vitro route 2 (two) times a day      ipratropium (ATROVENT) 0 02 % nebulizer solution Take 2 5 mL by nebulization 3 (three) times a day  Qty: 75 mL, Refills: 0      !! KLOR-CON M20 20 MEQ tablet TAKE 2 TABLETS BY MOUTH DAILY  Qty: 180 tablet, Refills: 0    Associated Diagnoses: Hypertension, unspecified type      !!  Lancets (FREESTYLE) lancets FreeStyle Lancets Miscellaneous  TEST 1 QD   Quantity: 1;  Refills: 5      Rsoeline Dowling ;  Start 15-Ben-2016  Active  100 Miscellaneous Box      !! Lancets (FREESTYLE) lancets by Does not apply route daily      levalbuterol (XOPENEX) 1 25 mg/0 5 mL nebulizer solution Take 0 5 mL by nebulization 3 (three) times a day  Qty: 1 each, Refills: 0      magnesium chloride-calcium (SLOW-MAG) 71 5-119 mg Take by mouth      !! metFORMIN (GLUCOPHAGE-XR) 500 mg 24 hr tablet Take by mouth daily      !! metFORMIN (GLUCOPHAGE-XR) 500 mg 24 hr tablet TAKE 1 TABLET BY MOUTH TWICE A DAY  Qty: 90 tablet, Refills: 3    Associated Diagnoses: Type 2 diabetes mellitus without complication, without long-term current use of insulin (HCC)      metoprolol succinate (TOPROL-XL) 25 mg 24 hr tablet TAKE 1 TABLET BY MOUTH EVERY DAY  Qty: 90 tablet, Refills: 3    Associated Diagnoses: Essential hypertension      omeprazole (PriLOSEC) 20 mg delayed release capsule TAKE 1 CAPSULE EVERY DAY  Qty: 90 capsule, Refills: 3    Associated Diagnoses: Gastroesophageal reflux disease without esophagitis      ! ! potassium chloride (KLOR-CON M20) 20 mEq tablet Take 2 tablets by mouth daily      rivaroxaban (XARELTO) 15 mg tablet Take 1 tablet by mouth daily      simvastatin (ZOCOR) 20 mg tablet TAKE 1 TABLET BY MOUTH EVERY DAY  Qty: 90 tablet, Refills: 0    Associated Diagnoses: Mixed hyperlipidemia      tamsulosin (FLOMAX) 0 4 mg TAKE 1 CAPSULE BY MOUTH DAILY  Qty: 90 capsule, Refills: 3    Associated Diagnoses: Benign prostatic hyperplasia, unspecified whether lower urinary tract symptoms present       !! - Potential duplicate medications found  Please discuss with provider  No discharge procedures on file      ED Provider  Electronically Signed by           BHANU Mace  02/05/19 9180

## 2019-02-05 NOTE — ASSESSMENT & PLAN NOTE
· Presents from home with generalized weakness and fall x 2 reported by daughter  · Symptoms likely multifactorial in the setting of multiple chronic comorbidities including CHF, CKD, COPD, chronic lower extremity edema with venous stasis dermatitis  · Patient lives alone with limited ambulation and will be an unsafe discharge home at this time  · Optimize management of underlying comorbidities  · PT/OT evaluation

## 2019-02-05 NOTE — ASSESSMENT & PLAN NOTE
· At baseline, usually in a wheelchair but able to ambulate with a cane or walker    This appears to have been increasingly more difficult lately  · Multifactorial   PT OT evaluation as above

## 2019-02-05 NOTE — ASSESSMENT & PLAN NOTE
· Status post fall at home  Patient currently denies pain  · CT chest showed acute fracture of the manubrium  Multiple old bilateral rib fractures    No acute rib fractures  · Conservative management

## 2019-02-05 NOTE — RESPIRATORY THERAPY NOTE
RT Protocol Note  Chelle Jasso 81 y o  male MRN: [id number]  Unit/Bed#: -01 Encounter: [id number]    Assessment    Principal Problem:    Generalized weakness  Active Problems:    Type 2 diabetes mellitus with skin complication, without long-term current use of insulin (HCC)    Atrial fibrillation (HCC)    Chronic diastolic congestive heart failure (HCC)    CKD (chronic kidney disease)    COPD (chronic obstructive pulmonary disease) ([facility])    Chronic 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    Closed fracture of manubrium    Ambulatory dysfunction      Home Pulmonary Medications:  Albuterol as needed  Past Medical History:   Diagnosis Date    Abdominal aortic aneurysm (AAA) ([facility])     Anticoagulant long-term use     Cardiac disease     CHF (congestive heart failure) ([facility])     Chronic kidney disease     COPD (chronic obstructive pulmonary disease) ([facility])     Diabetes mellitus ([facility])     Disorder of upper respiratory tract     GERD (gastroesophageal reflux disease)     Hyperlipidemia     Hypertension     Morbid (severe[facility] obesity due to excess calories [facility]     Nonmelanoma skin cancer     last assessed 6/1/17     Osteoporosis     Parkinsons disease, secondary ([facility] )     Pulmonary nodules 12/1/2017    Renal disorder     Renal failure     last assessed 2/28/14    Renal failure     last assessed 2/28/14      Social History     Social History    Marital status:       Spouse name: N/A    Number of children: N/A    Years of education: N/A     Occupational History    retired      Social History Main Topics    Smoking status: Former Smoker     Packs/day: 2 00     Quit date: 11/29/1964    Smokeless tobacco: Never Used      Comment: non smoker tobacoo use per allscript     Alcohol use No    Drug use: No    Sexual activity: Not Currently     Other Topics Concern    None     Social History Narrative    Active advance directive on file    No advance directive on file Subjective         Objective    Physical Exam:   Assessment Type: Assess only  General Appearance: Alert, Awake  Respiratory Pattern: Normal  Chest Assessment: Chest expansion symmetrical  Bilateral Breath Sounds: Diminished, Clear    Vitals:  Blood pressure 146/86, pulse 95, temperature 97 6 °F (36 4 °C), temperature source Oral, resp  rate (!) 26, height 5' 10" (1 778 m), weight 84 3 kg (185 lb 13 6 oz), SpO2 94 %  Imaging and other studies: I have personally reviewed pertinent reports  Plan    Respiratory Plan: No distress/Pulmonary history        Resp Comments: pt admitted post fall at home  He takes bronshocilators via neb as needed but hasn't felt he needed one  Neb tx's to follow home plan of PRN for SOB wheezing   Pt also takes Advair Qam

## 2019-02-05 NOTE — H&P
H&P- Rashmi Ramirez 1935, 81 y o  male MRN: [id number]    Unit/Bed#: ED 09 Encounter: [id number]    Primary Care Provider: Corinne Grams, MD   Date and time admitted to hospital: 2/5/2019 10:23 AM    * Generalized weakness   Assessment & Plan    · Presents from home with generalized weakness and fall x 2 reported by daughter  · Symptoms likely multifactorial in the setting of multiple chronic comorbidities including CHF, CKD, COPD, chronic lower extremity edema with venous stasis dermatitis  · Patient lives alone with limited ambulation and will be an unsafe discharge home at this time  · Optimize management of underlying comorbidities  · PT/OT evaluation     Chronic 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   Assessment & Plan    · Has history of venous stasis dermatitis bilateral extremity presenting with worsening lesion with weeping, greenish discharge and foul odor  · No definite evidence of cellulitis at this time  Patient is afebrile and without leukocytosis  · Clean wounds and apply Ace wraps  · Obtain podiatry evaluation for further management of lower extremity dermatitis       Closed fracture of manubrium   Assessment & Plan    · Status post fall at home  Patient currently denies pain  · CT chest showed acute fracture of the manubrium  Multiple old bilateral rib fractures  No acute rib fractures  · Conservative management     Ambulatory dysfunction   Assessment & Plan    · At baseline, usually in a wheelchair but able to ambulate with a cane or walker    This appears to have been increasingly more difficult lately  · Multifactorial   PT OT evaluation as above     Chronic diastolic congestive heart failure (HCC)   Assessment & Plan    · No overt signs/symptoms of acute decompensation  · Continue home dose diuretics, daily weights, I's and O's, 2 g sodium diet  · Last echocardiogram in November 2017 showed EF 50%     Type 2 diabetes mellitus with skin complication, without long-term current use of insulin ([facility] [address] )   Assessment & Plan    Lab Results   Component Value Date    HGBA1C 6 6 (H) 10/19/2018       No results for input(s): POCGLU in the last 72 hours  Blood Sugar Average: Last 72 hrs:  · Hold metformin  Accu-Cheks a c  HS with correctional scale insulin  · Check hemoglobin A1c     CKD (chronic kidney disease)   Assessment & Plan    · Creatinine appears to be at baseline  Continue to monitor     COPD (chronic obstructive pulmonary disease) ([facility])   Assessment & Plan    · No acute exacerbation  · Continue Advair, nebs, respiratory protocol  · CT chest shows patchy density in the left lower lobe, atelectasis vs pneumonia  · Patient is afebrile and without leukocytosis or acute respiratory symptoms  · Procalcitonin levels pending  Hold off on antibiotics at this time     Atrial fibrillation [facility])   Assessment & Plan    · Rate control  Continue Toprol XL  · On Xarelto         VTE Prophylaxis: Rivaroxaban (Xarelto)       Code Status: Full Code    POLST: POLST form is not discussed and not completed at this time  Anticipated Length of Stay:  Patient will be admitted on an Inpatient basis with an anticipated length of stay of  > 2 midnights  Justification for Hospital Stay:  Ambulatory dysfunction, fall, generalized weakness    Chief Complaint:     Generalized weakness/Fall    History of Present Illness:  Sergey Nava is a 81 y o  male who presents with generalized weakness and fall  Patient's daughter at the bedside provides additional history  Patient apparently fell last night while attempting to get out of his wheelchair  He denies any loss of consciousness, no dizziness or lightheadedness, no chest pain  Patient reports he tripped and fell  When daughter saw him today, she attempted to get him out of his chair but he appeared weaker than his usual baseline  He reports shortness of breath with exertion but denies any orthopnea or PND  He denies any fever or chills    He has a history of chronic lower extremity stasis dermatitis and typically wraps is wounds daily himself  Evaluation of the wound however show that the patient has not been adequately caring for lower extremities  He lives alone and makes his own meals  He denies any pain currently and was not thrilled with having to be admitted to the hospital     Review of Systems   Constitutional: Positive for fatigue  HENT: Negative  Respiratory: Positive for shortness of breath  Negative for cough  Cardiovascular: Positive for leg swelling  Negative for chest pain  Gastrointestinal: Negative  Genitourinary: Negative  Musculoskeletal: Negative  Skin: Positive for color change, rash and wound  Neurological: Negative for dizziness, syncope, light-headedness and headaches  All other systems reviewed and are negative        Past Medical History:   Diagnosis Date    Abdominal aortic aneurysm (AAA) ([facility])     Anticoagulant long-term use     Cardiac disease     CHF (congestive heart failure) ([facility]     Chronic kidney disease     COPD (chronic obstructive pulmonary disease) (HCC)     Diabetes mellitus (HCC)     Disorder of upper respiratory tract     GERD (gastroesophageal reflux disease)     Hyperlipidemia     Hypertension     Morbid (severe) obesity due to excess calories ([address] )     Nonmelanoma skin cancer     last assessed 6/1/17     Osteoporosis     Parkinsons disease, secondary ([facility] [address] )     Pulmonary nodules 12/1/2017    Renal disorder     Renal failure     last assessed 2/28/14    Renal failure     last assessed 2/28/14        Past Surgical History:   Procedure Laterality Date    CORONARY ANGIOPLASTY      CORONARY ANGIOPLASTY WITH STENT PLACEMENT      EYE SURGERY         Family History:  Family History   Problem Relation Age of Onset    Heart attack Mother         acute    Heart attack Father         acute    Emphysema Father     Hypertension Father        Home Meds:  all medications and allergies reviewed    Allergies: Allergies   Allergen Reactions    Cephalexin          Marital Status:      History   Alcohol Use No     History   Smoking Status    Former Smoker    Packs/day: 2 00    Quit date: 11/29/1964   Smokeless Tobacco    Never Used     Comment: non smoker tobacoo use per allscript      History   Drug Use No           Physical Exam:   Vitals:   Blood Pressure: 146/86 (02/05/19 1030)  Pulse: 95 (02/05/19 1030)  Temperature: 97 6 °F (36 4 °C) (02/05/19 1030)  Temp Source: Oral (02/05/19 1030)  Respirations: (!) 26 (02/05/19 1030)  Height: 5' 10" (177 8 cm) (02/05/19 1030)  Weight - Scale: 84 3 kg (185 lb 13 6 oz) (02/05/19 1030)  SpO2: 94 % (02/05/19 1030)    Physical Exam   Constitutional: He appears well-developed  No distress  Chronically ill-appearing   HENT:   Head: Normocephalic and atraumatic  Eyes: Conjunctivae and EOM are normal    Neck: Normal range of motion  Neck supple  Cardiovascular: Normal rate  An irregularly irregular rhythm present  No murmur heard  Pulmonary/Chest: Effort normal  No respiratory distress  He has no wheezes  He has no rales  Coarse breath sounds bilaterally   Abdominal: Soft  Bowel sounds are normal  He exhibits no distension and no mass  There is no tenderness  Musculoskeletal: He exhibits edema  He exhibits no tenderness  Bilateral lower extremity chronic venous stasis changes with scaling, extremely foul odor, greenish/serous discharge from dorsum of both feet with weeping lesions and bleeding shin ulcer on the right leg  See attached files   Neurological: He is alert  Skin: Rash noted  He is not diaphoretic  There is erythema  Lab Results: I have personally reviewed pertinent reports          Results from last 7 days  Lab Units 02/05/19  1107   WBC Thousand/uL 9 16   HEMOGLOBIN g/dL 13 5   HEMATOCRIT % 46 2   PLATELETS Thousands/uL 230   NEUTROS PCT % 74   LYMPHS PCT % 16   MONOS PCT % 8   EOS PCT % 1       Results from last 7 days  Lab Units 02/05/19  1107   POTASSIUM mmol/L 4 8   CHLORIDE mmol/L 101   CO2 mmol/L 29   BUN mg/dL 17   CREATININE mg/dL 1 48*   CALCIUM mg/dL 9 5   ALK PHOS U/L 110   ALT U/L 12   AST U/L 21       Results from last 7 days  Lab Units 02/05/19  1107   INR  2 38*       Imaging: I have personally reviewed pertinent reports  Ct Chest Without Contrast    Result Date: 2/5/2019  Narrative: CT CHEST WITHOUT IV CONTRAST INDICATION:   Fall, left chest pain  COMPARISON:  10/19/2018 TECHNIQUE: CT examination of the chest was performed without intravenous contrast   Axial, sagittal, and coronal 2D reformatted images were created from the source data and submitted for interpretation  Radiation dose length product (DLP) for this visit:  321 mGy-cm   This examination, like all CT scans performed in the [facility], was performed utilizing techniques to minimize radiation dose exposure, including the use of iterative reconstruction and automated exposure control  FINDINGS: LUNGS:  There is patchy density in the left lower lobe, atelectasis versus pneumonia  There are paraseptal and centrilobular emphysematous changes  There is a large calcified granuloma in the right lower lobe  PLEURA:  Unremarkable  HEART/GREAT VESSELS:  Unremarkable for patient's age  MEDIASTINUM AND ENID:  Unremarkable  CHEST WALL AND LOWER NECK:   Unremarkable  VISUALIZED STRUCTURES IN THE UPPER ABDOMEN:  Unremarkable  OSSEOUS STRUCTURES:  There is an acute fracture of the manubrium  Multiple old bilateral rib fractures are seen  Impression: 1  Acute fracture of the manubrium  No acute rib fractures  2   Patchy density in the left lower lobe, atelectasis versus pneumonia  3   Paraseptal and centrilobular emphysema  The study was marked in [facility] for immediate notification   Workstation performed: [id number]     ** Please Note: Dragon 360 Dictation voice to text software may have been used in the creation of this document   **

## 2019-02-05 NOTE — ASSESSMENT & PLAN NOTE
· No overt signs/symptoms of acute decompensation  · Continue home dose diuretics, daily weights, I's and O's, 2 g sodium diet  · Last echocardiogram in November 2017 showed EF 50%

## 2019-02-06 PROBLEM — N17.9 ACUTE KIDNEY INJURY (HCC): Status: ACTIVE | Noted: 2019-02-06

## 2019-02-06 LAB
ANION GAP SERPL CALCULATED.3IONS-SCNC: 9 MMOL/L (ref 4–13)
BUN SERPL-MCNC: 19 MG/DL (ref 5–25)
CALCIUM SERPL-MCNC: 8.4 MG/DL (ref 8.3–10.1)
CHLORIDE SERPL-SCNC: 105 MMOL/L (ref 100–108)
CO2 SERPL-SCNC: 26 MMOL/L (ref 21–32)
CREAT SERPL-MCNC: 1.38 MG/DL (ref 0.6–1.3)
ERYTHROCYTE [DISTWIDTH] IN BLOOD BY AUTOMATED COUNT: 19.4 % (ref 11.6–15.1)
GFR SERPL CREATININE-BSD FRML MDRD: 47 ML/MIN/1.73SQ M
GLUCOSE SERPL-MCNC: 100 MG/DL (ref 65–140)
GLUCOSE SERPL-MCNC: 101 MG/DL (ref 65–140)
GLUCOSE SERPL-MCNC: 154 MG/DL (ref 65–140)
GLUCOSE SERPL-MCNC: 158 MG/DL (ref 65–140)
GLUCOSE SERPL-MCNC: 165 MG/DL (ref 65–140)
HCT VFR BLD AUTO: 40.3 % (ref 36.5–49.3)
HGB BLD-MCNC: 12.3 G/DL (ref 12–17)
MCH RBC QN AUTO: 22.2 PG (ref 26.8–34.3)
MCHC RBC AUTO-ENTMCNC: 30.5 G/DL (ref 31.4–37.4)
MCV RBC AUTO: 73 FL (ref 82–98)
PLATELET # BLD AUTO: 199 THOUSANDS/UL (ref 149–390)
PMV BLD AUTO: 9.7 FL (ref 8.9–12.7)
POTASSIUM SERPL-SCNC: 4.6 MMOL/L (ref 3.5–5.3)
RBC # BLD AUTO: 5.53 MILLION/UL (ref 3.88–5.62)
SODIUM SERPL-SCNC: 140 MMOL/L (ref 136–145)
WBC # BLD AUTO: 9.43 THOUSAND/UL (ref 4.31–10.16)

## 2019-02-06 PROCEDURE — 85027 COMPLETE CBC AUTOMATED: CPT | Performed by: INTERNAL MEDICINE

## 2019-02-06 PROCEDURE — G8988 SELF CARE GOAL STATUS: HCPCS

## 2019-02-06 PROCEDURE — 97163 PT EVAL HIGH COMPLEX 45 MIN: CPT

## 2019-02-06 PROCEDURE — G8979 MOBILITY GOAL STATUS: HCPCS

## 2019-02-06 PROCEDURE — 99232 SBSQ HOSP IP/OBS MODERATE 35: CPT | Performed by: INTERNAL MEDICINE

## 2019-02-06 PROCEDURE — G8978 MOBILITY CURRENT STATUS: HCPCS

## 2019-02-06 PROCEDURE — 97167 OT EVAL HIGH COMPLEX 60 MIN: CPT

## 2019-02-06 PROCEDURE — G8987 SELF CARE CURRENT STATUS: HCPCS

## 2019-02-06 PROCEDURE — 80048 BASIC METABOLIC PNL TOTAL CA: CPT | Performed by: INTERNAL MEDICINE

## 2019-02-06 PROCEDURE — 82948 REAGENT STRIP/BLOOD GLUCOSE: CPT

## 2019-02-06 RX ORDER — IPRATROPIUM BROMIDE AND ALBUTEROL SULFATE 2.5; .5 MG/3ML; MG/3ML
3 SOLUTION RESPIRATORY (INHALATION)
Status: DISCONTINUED | OUTPATIENT
Start: 2019-02-06 | End: 2019-02-06

## 2019-02-06 RX ADMIN — POTASSIUM CHLORIDE 40 MEQ: 1500 TABLET, EXTENDED RELEASE ORAL at 10:06

## 2019-02-06 RX ADMIN — TAMSULOSIN HYDROCHLORIDE 0.4 MG: 0.4 CAPSULE ORAL at 17:53

## 2019-02-06 RX ADMIN — FLUTICASONE FUROATE AND VILANTEROL TRIFENATATE 1 PUFF: 200; 25 POWDER RESPIRATORY (INHALATION) at 10:07

## 2019-02-06 RX ADMIN — PANTOPRAZOLE SODIUM 40 MG: 40 TABLET, DELAYED RELEASE ORAL at 05:48

## 2019-02-06 RX ADMIN — INSULIN LISPRO 2 UNITS: 100 INJECTION, SOLUTION INTRAVENOUS; SUBCUTANEOUS at 21:29

## 2019-02-06 RX ADMIN — RIVAROXABAN 15 MG: 15 TABLET, FILM COATED ORAL at 10:07

## 2019-02-06 RX ADMIN — PRAVASTATIN SODIUM 40 MG: 40 TABLET ORAL at 17:53

## 2019-02-06 RX ADMIN — INSULIN LISPRO 1 UNITS: 100 INJECTION, SOLUTION INTRAVENOUS; SUBCUTANEOUS at 17:53

## 2019-02-06 RX ADMIN — METOPROLOL SUCCINATE 25 MG: 25 TABLET, EXTENDED RELEASE ORAL at 10:07

## 2019-02-06 RX ADMIN — INSULIN LISPRO 1 UNITS: 100 INJECTION, SOLUTION INTRAVENOUS; SUBCUTANEOUS at 12:37

## 2019-02-06 RX ADMIN — FUROSEMIDE 40 MG: 40 TABLET ORAL at 10:07

## 2019-02-06 RX ADMIN — FUROSEMIDE 40 MG: 40 TABLET ORAL at 17:53

## 2019-02-06 NOTE — PLAN OF CARE
Problem: PHYSICAL THERAPY ADULT  Goal: Performs mobility at highest level of function for planned discharge setting  See evaluation for individualized goals  Treatment/Interventions: Functional transfer training, LE strengthening/ROM, Therapeutic exercise, Endurance training, Patient/family training, Equipment eval/education, Bed mobility, Continued evaluation, Spoke to nursing, OT          See flowsheet documentation for full assessment, interventions and recommendations  Prognosis: Good  Problem List: Decreased strength, Decreased endurance, Decreased range of motion, Impaired balance, Decreased mobility, Decreased skin integrity (Sternal precautions)  Assessment: Pt is 81 y o  male seen for PT evaluation s/p admit to [facility] on 2019 w/ Generalized weakness  PT consulted to assess pt's functional mobility and d/c needs  Order placed for PT eval and tx, w/ up w/ A order  Performed at least 2 patient identifiers during session: Name and   Comorbidities affecting pt's physical performance at time of assessment include: AAA, anticoagulant long-term use, cardiac disease, CHF, CKD, COPD, DM, GERD, hyperlipidemia, HTN, morbid (severe) obesity due to excess calories, nonmelanoma skin cancer, osteoporosis, Parkinsons disease, secondary, pulmonary nodules, renal failure  PTA, pt was independent w/ all functional mobility w/ RW, ambulates household distances, lives alone in one level apartment ([address] and retired  Personal factors affecting pt at time of IE include: ambulating w/ assistive device, inability to ambulate household distances, inability to navigate level surfaces w/o external assistance, limited home support, positive fall history, limited insight into impairments, inability to perform IADLs, inability to perform ADLs and inability to live alone   Please find objective findings from PT assessment regarding body systems outlined above with impairments and limitations including weakness, decreased ROM, impaired balance, decreased endurance, decreased activity tolerance, decreased functional mobility tolerance, fall risk, decreased skin integrity and sternal precautions  The following objective measures performed on IE also reveal limitations: Barthel Index: 30/100 and Modified [place]: 4 (moderate/severe disability)  Pt's clinical presentation is currently unstable/unpredictable seen in pt's presentation of unstable medical status requiring ongoing medical management/monitoring, evident in need for assist w/ all phases of mobility when usually mobilizing independently and need for input for task focus and mobility technique/safety including adherence to sternal precautions secondary to acute fracture of the manubrium  Pt to benefit from continued PT tx to address deficits as defined above and maximize level of functional independent mobility and consistency  From PT/mobility standpoint, recommendation at time of d/c would be STR pending progress in order to facilitate return to PLOF  Barriers to Discharge: Decreased caregiver support     Recommendation: Short-term skilled PT, Rehab consult     PT - OK to Discharge: Yes (when medically cleared; if to STR)    See flowsheet documentation for full assessment

## 2019-02-06 NOTE — PROGRESS NOTES
Progress Note - [place] 81 y o  male MRN: [id number]  Unit/Bed#: -01 Encounter: [id number]    Subjective:   Kendrick Mcbride feels okay but notes tenderness across his upper chest which include tense if eyes if he uses his upper extremity  He has a minimally productive, loose cough but denies shortness of breath, fever or chill  He said his appetite was poor after the fall yesterday but is improved and he had a good breakfast this morning  He does feel generally weak  He denies any nausea or vomiting  He denies any melena or hematochezia  All other ROS are negative  Objective:   Vitals: Blood pressure 141/62, pulse 64, temperature 98 3 °F (36 8 °C), temperature source Oral, resp  rate 18, height 5' 10" (1 778 m), weight 84 3 kg (185 lb 13 6 oz), SpO2 90 %  ,Body mass index is 26 67 kg/m²  SPO2 RA Rest      ED to Hosp-Admission (Current) from 2/5/2019 in [address] [place] [address] 2nd Floor Med Surg Unit   SpO2  90 %   SpO2 Activity  At Rest   O2 Device  None (Room air)   O2 Flow Rate          I&O:   Intake/Output Summary (Last 24 hours) at 02/06/19 1126  Last data filed at 02/06/19 0900   Gross per 24 hour   Intake             1420 ml   Output              250 ml   Net             1170 ml         Physical Exam:       General Appearance:    Alert, cooperative, chronically ill-appearing but no distress   Head:    Normocephalic, without obvious abnormality, atraumatic   Eyes:    PERRL, conjunctiva/corneas clear, EOM's intact       Nose:   Moist mucous membranes, no drainage or sinus tenderness   Throat:   No tenderness, no exudates   Neck:   Supple, symmetrical, trachea midline, no JVD   Lungs:     Loose crackles predominantly at left base, prolonged expiratory phase, respirations unlabored   Heart:    Heart sounds are distant    Regular rate and rhythm, S1 and S2 normal, 1/6 systolic murmur, no rub   or gallop   Abdomen: Soft, non-tender, positive bowel sounds, no masses, no organomegaly   Extremities: Chronic venous insufficiency changes with stasis dermatitis, dressings are intact   Neurologic:     CNII-XII intact        Invasive Devices     Peripheral Intravenous Line            Peripheral IV 02/05/19 Right Arm 1 day                      Social History  reviewed  Family History   Problem Relation Age of Onset    Heart attack Mother         acute    Heart attack Father         acute    Emphysema Father     Hypertension Father     reviewed    Meds:  Current Facility-Administered Medications   Medication Dose Route Frequency Provider Last Rate Last Dose    acetaminophen (TYLENOL) tablet 650 mg  650 mg Oral Q6H PRN Sienna Pulliam MD        albuterol inhalation solution 2 5 mg  2 5 mg Nebulization Q4H PRN Sienna Pulliam MD        fluticasone-vilanterol (BREO ELLIPTA) 200-25 MCG/INH inhaler 1 puff  1 puff Inhalation Daily Sienna Pulliam MD   1 puff at 02/06/19 1007    furosemide (LASIX) tablet 40 mg  40 mg Oral BID Sienna Pulliam MD   40 mg at 02/06/19 1007    insulin lispro (HumaLOG) 100 units/mL subcutaneous injection 1-6 Units  1-6 Units Subcutaneous TID AC BHANU Aguilar        insulin lispro (HumaLOG) 100 units/mL subcutaneous injection 1-6 Units  1-6 Units Subcutaneous HS BHANU Adam        ipratropium-albuterol (DUO-NEB) 0 5-2 5 mg/3 mL inhalation solution 3 mL  3 mL Nebulization Q6H Aracelis Tillman MD        metoprolol succinate (TOPROL-XL) 24 hr tablet 25 mg  25 mg Oral Daily Sienna Pulliam MD   25 mg at 02/06/19 1007    pantoprazole (PROTONIX) EC tablet 40 mg  40 mg Oral Early Morning Sienna Pulliam MD   40 mg at 02/06/19 0548    potassium chloride (K-DUR,KLOR-CON) CR tablet 40 mEq  40 mEq Oral Daily Sienna Pulliam MD   40 mEq at 02/06/19 1006    pravastatin (PRAVACHOL) tablet 40 mg  40 mg Oral Daily With Mp Horn MD   40 mg at 02/05/19 1630    rivaroxaban (XARELTO) tablet 15 mg  15 mg Oral Daily Sienna Pulliam MD   15 mg at 02/06/19 1007    tamsulosin (FLOMAX) capsule 0 4 mg  0 4 mg Oral Daily With Vesna Knight MD   0 4 mg at 02/05/19 1629      Prescriptions Prior to Admission   Medication    fluticasone-salmeterol (ADVAIR DISKUS) 250-50 mcg/dose inhaler    furosemide (LASIX) 40 mg tablet    Glucose Blood (FREESTYLE LITE TEST VI)    ipratropium (ATROVENT) 0 02 % nebulizer solution    KLOR-CON M20 20 MEQ tablet    Lancets (FREESTYLE) lancets    Lancets (FREESTYLE) lancets    levalbuterol (XOPENEX) 1 25 mg/0 5 mL nebulizer solution    magnesium chloride-calcium (SLOW-MAG) 71 5-119 mg    metFORMIN (GLUCOPHAGE-XR) 500 mg 24 hr tablet    metFORMIN (GLUCOPHAGE-XR) 500 mg 24 hr tablet    metoprolol succinate (TOPROL-XL) 25 mg 24 hr tablet    omeprazole (PriLOSEC) 20 mg delayed release capsule    potassium chloride (KLOR-CON M20) 20 mEq tablet    rivaroxaban (XARELTO) 15 mg tablet    simvastatin (ZOCOR) 20 mg tablet    tamsulosin (FLOMAX) 0 4 mg       Labs:    Results from last 7 days  Lab Units 02/06/19  0612 02/05/19  1107   WBC Thousand/uL 9 43 9 16   HEMOGLOBIN g/dL 12 3 13 5   HEMATOCRIT % 40 3 46 2   PLATELETS Thousands/uL 199 230   NEUTROS PCT %  --  74   LYMPHS PCT %  --  16   MONOS PCT %  --  8   EOS PCT %  --  1       Results from last 7 days  Lab Units 02/06/19  0612 02/05/19  1107   POTASSIUM mmol/L 4 6 4 8   CHLORIDE mmol/L 105 101   CO2 mmol/L 26 29   BUN mg/dL 19 17   CREATININE mg/dL 1 38* 1 48*   CALCIUM mg/dL 8 4 9 5   ALK PHOS U/L  --  110   ALT U/L  --  12   AST U/L  --  21     Lab Results   Component Value Date    TROPONINI 0 04 02/05/2019    TROPONINI 2 37 (H) 11/30/2017    TROPONINI 3 02 (H) 11/29/2017    CKMB 2 1 12/01/2017    CKMB 5 1 (H) 11/30/2017    CKMB 19 9 (H) 11/29/2017    CKTOTAL 536 (H) 12/01/2017    CKTOTAL 1,084 (H) 11/30/2017    CKTOTAL 2,573 (H) 11/29/2017       Results from last 7 days  Lab Units 02/05/19  1107   INR  2 38*     Lab Results   Component Value Date    SPUTUMCULTUR 3+ Growth of Escherichia coli (A) 12/03/2017 SPUTUMCULTUR 3+ Growth of  12/03/2017       Imaging:  No results found for this or any previous visit  Results for orders placed during the hospital encounter of 11/29/17   XR chest pa & lateral    Narrative CHEST     INDICATION:  Patient fell  Cough  COMPARISON:  None    VIEWS:  Frontal and lateral projections    IMAGES:  2    FINDINGS:  Lungs are adequately aerated  Small effusions on lateral view larger on the left and right  Ovoid 3 6 cm density in the right upper lobe of uncertain etiology, may be infectious or neoplastic  Follow-up imaging recommended after treatment  Right basilar infiltrate or atelectasis  Cardiac silhouette is enlarged  Mild vascular congestion  Prominent sean on lateral view likely dilated pulmonary vessels  No significant peribronchial thickening  Atherosclerotic aorta  Bony structures are diffusely demineralized and degenerative  No displaced rib fractures are seen  No pneumothorax  Impression Ovoid 3 6 cm density in the right upper lobe may be infectious or neoplastic  Infiltrate or atelectasis in the right lung base  Small pleural effusions larger on the left than right  Cardiomegaly with mild vascular congestion but without significant interstitial edema  *Follow-up x-ray recommended after treatment (computed tomography recommended if right upper lobe density persists)        Workstation performed: [id number]         VTE Pharmacologic Prophylaxis: Xarelto      Code Status:   Level 1 - Full Code    Assessment:  Principal Problem:    Generalized weakness  Active Problems:    Type 2 diabetes mellitus with skin complication, without long-term current use of insulin (HCC)    Atrial fibrillation (HCC)    Chronic diastolic congestive heart failure (HCC)    CKD (chronic kidney disease)    COPD (chronic obstructive pulmonary disease) (HCC)    Chronic 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    Closed fracture of manubrium    Ambulatory dysfunction  Resolved Problems:    * No resolved hospital problems  *      Plan:  · Generalized weakness-etiology of acute decompensation is unclear  CT chest suggestive of pneumonia however procalcitonin is normal, he is afebrile with normal white blood cell count  Acute kidney injury can be a contributing factor as well as general decline with advanced age and multiple medical comorbidities  At this point will continue with physical therapy  Consult Physical Medicine and Rehabilitation regarding his fracture of the manubrium  He will need short-term rehab  · Acute kidney injury has improved, continue to monitor, baseline CKD 2 with estimated GFR in the 60s  · Type 2 diabetes mellitus-hold metformin for now, continue with sliding scale  · Atrial fibrillation-continue Xarelto, heart rate is controlled  · Chronic diastolic congestive heart failure appears fairly well compensated on p o  Lasix, continue with same  · COPD-add DuoNeb and incentive spirometry  · Stasis dermatitis-appreciate wound care assessment and recommendations, continue as recommended    · Disposition-patient will require 2 more midnights prior to being eligible for subacute rehab      Ehsan Sneed MD  2/6/2019,11:26 AM

## 2019-02-06 NOTE — PHYSICAL THERAPY NOTE
Physical Therapy Evaluation     Patient's Name: Sascha Nicole    Admitting Diagnosis  Weakness [R53 1]  Pain [R52]  Congestive heart failure (CHF) ([facility] ) [I50 9]  Fracture of manubrium [S22 21XA]  Chronic 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 [I87 2]    Problem List  Patient Active Problem List   Diagnosis    Type 2 diabetes mellitus with skin complication, without long-term current use of insulin (HCC)    GERD (gastroesophageal reflux disease)    BPH (benign prostatic hyperplasia)    Atrial fibrillation (HCC)    Venous stasis dermatitis of left lower extremity    Chronic diastolic congestive heart failure (HCC)    CKD (chronic kidney disease)    Osteoporosis    Fall    Shortness of breath    Cough    Rhabdomyolysis    NSTEMI (non-ST elevated myocardial infarction) ([facility] )    COPD (chronic obstructive pulmonary disease) ([facility] )    Hypokalemia    Abnormal chest x-ray    Pulmonary nodules    Pneumonia    Unknown skin lesion    History of skin cancer    Screening for skin condition    Seborrheic keratosis    Chronic 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    Squamous cell cancer of skin of left forearm    Basal cell carcinoma (BCC) of left medial cheek    Generalized weakness    Closed fracture of manubrium    Ambulatory dysfunction    Acute kidney injury [facility])       Past Medical History  Past Medical History:   Diagnosis Date    Abdominal aortic aneurysm (AAA) ([facility] )     Anticoagulant long-term use     Cardiac disease     CH[facility]congestive heart failure) ([facility])     Chronic kidney disease     COPD (chronic obstructive pulmonary disease) ([facility] )     Diabetes mellitus ([facility] )     Disorder of upper respiratory tract     GERD (gastroesophageal reflux disease)     Hyperlipidemia     Hypertension     Morbid (severe) obesity due to excess calories ([facility] )     Nonmelanoma skin cancer     last assessed 6/1/17     Osteoporosis     Parkinsons disease, secondary ([place] [address] )     Pulmonary nodules 12/1/2017    Renal disorder     Renal failure     last assessed 2/28/14    Renal failure     last assessed 2/28/14        Past Surgical History  Past Surgical History:   Procedure Laterality Date    CORONARY ANGIOPLASTY      CORONARY ANGIOPLASTY WITH STENT PLACEMENT      EYE SURGERY        02/06/19 4982   Note Type   Note type Eval only   Pain Assessment   Pain Assessment No/denies pain   Pain Score No Pain  (denies resting pain; reports painful with movement; denies pain throughout session)   Pain Type Acute pain   Pain Location Sternum   Pain Orientation Mid;Left   Pain Frequency Intermittent   Pain Onset Ongoing   Clinical Progression Not changed   Hospital Pain Intervention(s) Repositioned   Multiple Pain Sites No   Home Living   Type of [facility]  ([facility] [address] One level;Elevator;Performs ADLs on one level  (2nd floor apartment)   Bathroom Shower/Tub Tub/shower unit   Bathroom Toilet Standard   Bathroom Equipment Shower chair;Grab bars in shower;Grab bars around toilet   [address] [place];Cane;Hospital bed   Additional Comments ambulatory with RW   Prior Function   Level of [place] Independent with ADLs and functional mobility; Needs assistance with IADLs   Lives With Alone   Receives Help From Family; Other (Comment)  (2 daughters that live locally)   ADL Assistance Independent   IADLs Needs assistance   Falls in the last 6 months 1 to 4   Vocational Retired   Restrictions/Precautions   [place] Bearing Precautions Per Order No   Braces or Orthoses Other (Comment)  (none reported)   Other Precautions Cardiac/sternal;Fall Risk;Bed Alarm   General   Additional Pertinent History CT chest without contrast (2/5/2019): IMPRESSION: 1  Acute fracture of the manubrium  No acute rib fractures  2   Patchy density in the left lower lobe, atelectasis versus pneumonia  3   Paraseptal and centrilobular emphysema  Family/Caregiver Present No   Cognition   Overall Cognitive Status WFL   Arousal/Participation Cooperative   Attention Within functional limits   Orientation Level Oriented to person;Oriented to place;Oriented to situation  (Inconsistent to time)   Memory Within functional limits   Following Commands Follows all commands and directions without difficulty   Comments Pt agreeable to participate in skilled PT evaluation   RUE Assessment   RUE Assessment X  (Sternal precautions initiated secondary to acute fracture of the manubrium)   RUE Strength   RUE Overall Strength Deficits   LUE Assessment   LUE Assessment X  (Sternal precautions initiated secondary to acute fracture of the manubrium)   LUE Strength   LUE Overall Strength Deficits   RLE Assessment   RLE Assessment X   Strength RLE   R Hip Flexion 3+/5   R Knee Extension 3-/5   LLE Assessment   LLE Assessment X   Strength LLE   L Hip Flexion 3+/5   L Knee Extension 3-/5   Coordination   Movements are Fluid and Coordinated 1   Sensation WFL   Light Touch   RLE Light Touch Grossly intact   LLE Light Touch Grossly intact   Bed Mobility   Supine to Sit 3  Moderate assistance  (MODIFIED LOG ROLL MAINTAINING STERNAL PRECAUTIONS)   Additional items Assist x 2; Increased time required;Verbal cues;LE management   Sit to Supine 3  Moderate assistance  (MODIFIED LOG ROLL MAINTAINING STERNAL PRECAUTIONS)   Additional items Assist x 2; Increased time required;Verbal cues;LE management   Transfers   Sit to Stand 3  Moderate assistance   Additional items Assist x 2; Increased time required;Verbal cues   Stand to Sit 3  Moderate assistance   Additional items Assist x 2; Increased time required;Verbal cues   Ambulation/Elevation   Gait pattern Not appropriate; Not tested   Balance   Static Sitting Fair +   Dynamic Sitting Fair   Static Standing Poor   Activity Tolerance   Activity Tolerance Patient limited by fatigue   Medical Staff Made Aware Co-evaluation with Barbara Lui secondary to complex medical condition of patient, mod A of 2 required to achieve and maintain transitional movements, requiring the need of skilled therapeutic intervention of 2 therapists to achieve delivery of services    Nurse Jessica Plasencia, RN verbalized patient appropriate for PT evaluation; post-session: patient returned BTB, all needs within reach and bed alarm engaged   Assessment   Prognosis Good   Problem List Decreased strength;Decreased endurance;Decreased range of motion; Impaired balance;Decreased mobility; Decreased skin integrity  (Sternal precautions)   Assessment Pt is 81 y o  male seen for PT evaluation s/p admit to [facility] on 2019 w/ Generalized weakness  PT consulted to assess pt's functional mobility and d/c needs  Order placed for PT eval and tx, w/ up w/ A order  Performed at least 2 patient identifiers during session: Name and   Comorbidities affecting pt's physical performance at time of assessment include: AAA, anticoagulant long-term use, cardiac disease, CHF, CKD, COPD, DM, GERD, hyperlipidemia, HTN, morbid (severe) obesity due to excess calories, nonmelanoma skin cancer, osteoporosis, Parkinsons disease, secondary, pulmonary nodules, renal failure  PTA, pt was independent w/ all functional mobility w/ RW, ambulates household distances, lives alone in one level apartment ([address])) and retired  Personal factors affecting pt at time of IE include: ambulating w/ assistive device, inability to ambulate household distances, inability to navigate level surfaces w/o external assistance, limited home support, positive fall history, limited insight into impairments, inability to perform IADLs, inability to perform ADLs and inability to live alone   Please find objective findings from PT assessment regarding body systems outlined above with impairments and limitations including weakness, decreased ROM, impaired balance, decreased endurance, decreased activity tolerance, decreased functional mobility tolerance, fall risk, decreased skin integrity and sternal precautions  The following objective measures performed on IE also reveal limitations: Barthel Index: 30/100 and Modified [place]: 4 (moderate/severe disability)  Pt's clinical presentation is currently unstable/unpredictable seen in pt's presentation of unstable medical status requiring ongoing medical management/monitoring, evident in need for assist w/ all phases of mobility when usually mobilizing independently and need for input for task focus and mobility technique/safety including adherence to sternal precautions secondary to acute fracture of the manubrium  Pt to benefit from continued PT tx to address deficits as defined above and maximize level of functional independent mobility and consistency  From PT/mobility standpoint, recommendation at time of d/c would be STR pending progress in order to facilitate return to PLOF  Barriers to Discharge Decreased caregiver support   Goals   Patient Goals to return home   STG Expiration Date 02/16/19   Short Term Goal #1 In 7-10 days: Increase bilateral LE strength 1 grade to facilitate independent mobility, Perform all bed mobility tasks modified independent adhering to sternal precautions to decrease caregiver burden, Perform all transfers with SBA to improve independence, Complete exercise program independently, Complete % of the time and PT to see and establish goals for ambulation when appropriate   Treatment Day 0   Plan   Treatment/Interventions Functional transfer training;LE strengthening/ROM; Therapeutic exercise; Endurance training;Patient/family training;Equipment eval/education; Bed mobility;Continued evaluation;Spoke to nursing;OT   PT Frequency Other (Comment)  (3-5x/wk)   Recommendation   Recommendation Short-term skilled PT;Rehab consult   PT - OK to Discharge Yes  (when medically cleared; if to STR)   Modified [place] Scale   Modified [place] Scale 4   Barthel Index   Feeding 5   Bathing 0   Grooming Score 0   Dressing Score 0   Bladder Score 5   Bowels Score 10   Toilet Use Score 5   Transfers (Bed/Chair) Score 5   Mobility (Level Surface) Score 0   Stairs Score 0   Barthel Index Score 30           Marc Gardiner, PT, DPT

## 2019-02-06 NOTE — OCCUPATIONAL THERAPY NOTE
Occupational Therapy Evaluation        Patient Name: Judie Cartwright  Today's Date: 2/6/2019 02/06/19 2371   Note Type   Note type Eval only   Restrictions/Precautions   Weight Bearing Precautions Per Order No   Braces or Orthoses Other (Comment)  (none reported)   Other Precautions Cardiac/sternal;Bed Alarm   Pain Assessment   Pain Assessment No/denies pain   Pain Score No Pain  (at rest, "only if I try to push with my left arm")   Home Living   Type of Home Apartment  ([facility] ([address]))   Home Layout One level;Elevator;Performs ADLs on one level  (2nd floor apartment)   Bathroom Shower/Tub Tub/shower unit   [address] [place] [address]  chair;Grab bars in shower;Grab bars around toilet   [address] [place];Cane;Hospital bed   Additional Comments ambulatory with RW   Prior Function   Level of [place] Independent with ADLs and functional mobility; Needs assistance with IADLs   Lives With Alone   Receives Help From Family; Other (Comment)  (2 daughters that live locally)   ADL Assistance Independent   IADLs Needs assistance   Falls in the last 6 months 1 to 4  (1 fall PTA)   Vocational Retired   Lifestyle   Autonomy Patient reporting ability to walk with a walker, "I was walking fine and doing everything myself", Patient lives alone in a [address] apartment- [facility] 2nd floor with elevator access  Daughters live locally and assist with IADLs     Reciprocal Relationships Supportive family   Psychosocial   Psychosocial (WDL) WDL   ADL   Eating Assistance 4  Minimal Assistance   Grooming Assistance 4  Minimal Assistance   UB Bathing Assistance 3  Moderate Assistance   LB Bathing Assistance 2  Maximal Assistance   UB Dressing Assistance 4  Minimal Assistance   LB Dressing Assistance 2  Maximal [address]  2  Maximal Assistance   Functional Assistance 2  Maximal Assistance   Bed Mobility   Supine to Sit 3  Moderate assistance   Additional items Assist x 2;HOB elevated; Increased time required;Verbal cues;LE management  (modified log rolling maintaining sternal precautions)   Sit to Supine 3  Moderate assistance   Additional items Assist x 2;Verbal cues;LE management; Bedrails;HOB elevated   Transfers   Sit to Stand 3  Moderate assistance   Additional items Assist x 2; Increased time required;Verbal cues  (with use of soft gait belt/ knee blocks/ verbal cues)   Stand to Sit 3  Moderate assistance   Additional items Assist x 2; Increased time required;Verbal cues   Functional Mobility   Functional Mobility 2  Maximal assistance   Balance   Static Sitting Fair +   Dynamic Sitting Fair   Static Standing Fair -   Dynamic Standing Poor   Activity Tolerance   Activity Tolerance Patient limited by fatigue   Medical Staff Made Aware CM Ayden Dub made aware of therapy outcome and recommendations   Nurse Made Aware CJ eBnedict verbalized patient appropriate for therapy, also made aware of therapy outcome and recommendations related to patient handling   RUE Assessment   RUE Assessment X  (limited overhead moveents/ arthritic joint changes)   RUE Strength   RUE Overall Strength Deficits  (3/5)   LUE Assessment   LUE Assessment X  (limited overhead moveents/ arthritic joint changes)   LUE Strength   LUE Overall Strength Deficits  (3/5)   Hand Function   Gross Motor Coordination Impaired   Fine Motor Coordination Impaired   Sensation   Light Touch No apparent deficits  (BUEs)   Vision-Basic Assessment   Current Vision Wears glasses only for reading   Patient Visual Report Other (Comment)  (No significant changes reported)   Cognition   Overall Cognitive Status WFL   Arousal/Participation Alert; Responsive; Cooperative   Attention Within functional limits   Orientation Level Oriented to person;Oriented to place;Oriented to situation  (inconsistent with time)   Memory Within functional limits   Following Commands Follows all commands and directions without difficulty   Assessment   Limitation Decreased ADL status; Decreased UE ROM; Decreased UE strength;Decreased endurance;Decreased Safe judgement during ADL;Decreased fine motor control;Decreased self-care trans;Decreased high-level ADLs   Prognosis Good   Assessment Patient is a 81 y o  male seen for OT evaluation s/p admit to [address] on 2/5/2019 w/Generalized weakness  Commorbidities affecting patient's functional performance at time of assessment include: Chronic venous stasis dermatitis of BLEs, closed fracture of manubrium, Chronic CHF, Type 2 DM,  Atrial Fibrillation, COPD  Orders placed for OT evaluation and treatment  Performed at least two patient identifiers during session including name and wristband  Prior to admission, Patient reporting ability to walk with a walker, "I was walking fine and doing everything myself", Patient lives alone in a [address] [place] [address] 2nd floor with elevator access  Daughters live locally and assist with IADLs  Personal factors affecting patient at time of initial evaluation include: limited caregiver support, limited insight into deficits, decreased initiation and engagement, difficulty performing ADLs and difficulty performing IADLs  Upon evaluation, patient requires minimal  and moderate assist for UB ADLs, maximal assist for LB ADLs, sit to stand transfers with mod assist of 2 with the use of gait belt, patient with difficulty making postural adjustments without the use of BUEs     Occupational performance is affected by the following deficits: decreased functional use of BUEs, in hand manipulation deficit with impaired reach, grasp and coordination, limited active ROM, decreased muscle strength, degenerative arthritic joint changes, impaired gross motor coordination, impaired fine motor coordination, dynamic sit/ stand balance deficit with poor standing tolerance time for self care and functional mobility, decreased activity tolerance, impaired problem solving, decreased safety awareness and postural control and postural alignment deficit, requiring external assistance to complete transitional movements  Therapist completed  extensive additional review of medical records and additional review of physical, cognitive or psychosocial history, clinical examination identifying 5 or more performance deficits, clinical decision making of a high complexity , consistent with a high complexity level evaluation  Patient to benefit from continued Occupational Therapy treatment while in the hospital to address deficits as defined above and maximize level of functional independence with ADLs and functional mobility  Plan   Treatment Interventions ADL retraining;Functional transfer training;UE strengthening/ROM; Endurance training;Patient/family training;Equipment evaluation/education; Fine motor coordination activities; Compensatory technique education;Continued evaluation; Energy conservation; Activityengagement   Goal Expiration Date 02/20/19   OT Frequency 3-5x/wk   Recommendation   OT Discharge Recommendation Short Term Rehab   Barthel Index   Feeding 5   Bathing 0   Grooming Score 0   Dressing Score 0   Bladder Score 5   Bowels Score 10   Toilet Use Score 5   Transfers (Bed/Chair) Score 5   Mobility (Level Surface) Score 0   Stairs Score 0   Barthel Index Score 30   Modified [place] Scale   Modified [place] Scale 4       Occupational Performance areas to address include: grooming , bathing/ shower, dressing, toilet hygiene, transfer to all surfaces, functional mobility, emergency response, health maintenance, medication routine/ management, IADLS: Household maintenance, IADLs: safety procedures, IADLs: meal prep/ clean up, Leisure Participation and Social participation  From OT standpoint, recommendation at time of d/c would be Short Term Rehab  Occupational Therapy goals:  In 7-14 days:     1- Patient will verbalize and demonstrate use of energy conservation/ deep breathing technique and work simplification skills during functional activity with no verbal cues  2-Patient will verbalize and demonstrate good body mechanics and joint protection techniques during  ADLs/ IADLs with no verbal cues  3- Patient will increase OOB/ sitting tolerance to 2-4 hours per day for increased participation in self care and leisure tasks with no s/s of exertion  4-Patient will increase standing tolerance time to 5  minutes with unilateral UE support to complete sink level ADLs@ mod I level   5- Patient will increase sitting tolerance at edge of bed to 20 minutes to complete UB ADLs @ set up assist level  6- Patient will transfer bed to Chair / toilet at Set up assist level with AD as indicated  7- Patient will complete UB ADLs with set up assist  8- Patient will complete LB ADLs with min assist with the use of adaptive equipment  9- Patient will complete toileting hygiene with set up assist/ supervision for thoroughness    10-Patient/ Family  will demonstrate competency with UE Home Exercise Program

## 2019-02-06 NOTE — PLAN OF CARE
Problem: OCCUPATIONAL THERAPY ADULT  Goal: Performs self-care activities at highest level of function for planned discharge setting  See evaluation for individualized goals  Treatment Interventions: ADL retraining, Functional transfer training, UE strengthening/ROM, Endurance training, Patient/family training, Equipment evaluation/education, Fine motor coordination activities, Compensatory technique education, Continued evaluation, Energy conservation, Activityengagement          See flowsheet documentation for full assessment, interventions and recommendations  Limitation: Decreased ADL status, Decreased UE ROM, Decreased UE strength, Decreased endurance, Decreased Safe judgement during ADL, Decreased fine motor control, Decreased self-care trans, Decreased high-level ADLs  Prognosis: Good  Assessment: Patient is a 81 y o  male seen for OT evaluation s/p [facility]/2019 w/Generalized weakness  Commorbidities affecting patient's functional performance at time of assessment include: Chronic venous stasis dermatitis of BLEs, closed fracture of manubrium, Chronic CHF, Type 2 DM,  Atrial Fibrillation, COPD  Orders placed for OT evaluation and treatment  Performed at least two patient identifiers during session including name and wristband  Prior to admission, Patient reporting ability to walk with a walker, "I was walking fine and doing everything myself", Patient lives alone in a [address] [place] [address] 2nd floor with elevator access  Daughters live locally and assist with IADLs  Personal factors affecting patient at time of initial evaluation include: limited caregiver support, limited insight into deficits, decreased initiation and engagement, difficulty performing ADLs and difficulty performing IADLs   Upon evaluation, patient requires minimal  and moderate assist for UB ADLs, maximal assist for LB ADLs, sit to stand transfers with mod assist of 2 with the use of gait belt, patient with difficulty making postural adjustments without the use of BUEs  Occupational performance is affected by the following deficits: decreased functional use of BUEs, in hand manipulation deficit with impaired reach, grasp and coordination, limited active ROM, decreased muscle strength, degenerative arthritic joint changes, impaired gross motor coordination, impaired fine motor coordination, dynamic sit/ stand balance deficit with poor standing tolerance time for self care and functional mobility, decreased activity tolerance, impaired problem solving, decreased safety awareness and postural control and postural alignment deficit, requiring external assistance to complete transitional movements  Therapist completed  extensive additional review of medical records and additional review of physical, cognitive or psychosocial history, clinical examination identifying 5 or more performance deficits, clinical decision making of a high complexity , consistent with a high complexity level evaluation  Patient to benefit from continued Occupational Therapy treatment while in the hospital to address deficits as defined above and maximize level of functional independence with ADLs and functional mobility        OT Discharge Recommendation: Short Term Rehab

## 2019-02-06 NOTE — CONSULTS
Progress Note - Wound   Chelle Jasso 81 y o  male MRN: [id number]  Unit/Bed#: -01 Encounter: [id number]      Assessment:  Wound care consulted for assessment of pressure injury noted on admission  Patient seen during PT session  Assist with standing and turning in the bed  Incontinent of bowel and bladder, dependent for care  B/l lower extremities are wrapped with dressings, podiatry consult has been placed by the attending  B/l heels are intact with dry scaling skin and hyperpigmentation, no wounds noted  Recommend offloading and hydraguard cream      No pressure injuries noted on assessment: R buttock with small 0 5x0 5x0cm area of dry well adhered scabbing  No open aspects  The skin to the b/l buttocks and mid sacrum is dry, chapped-like, with blanchable hyperpigmented skin  Sacrum slit is dry and non-macerated  L ischium with dry intact blanchable hypopigmented scarring  R ischium is intact with blanchable pink skin  No open aspects noted  Due to incontinence patient is a poor candidate for preventative foam dressings will recommend hydraguard cream     Patient tolerated well  Primary nurse aware of plan of care  Plan:   1  Apply hydraguard to b/l heels, buttocks, ischium and sacrum TID and PRN for prevention and protection  2  Apply skin nourishing cream the entire skin daily for moisture  3  Turn and reposition patient every  2 hours   4  Elevate heels off of bed with pillows to offload pressure   5  Soft care cushion to chair when OOB, if able  6  Podiatry consult is pending for lower extremities      Objective:    Vitals: Blood pressure 141/62, pulse 64, temperature 98 3 °F (36 8 °C), temperature source Oral, resp  rate 18, height 5' 10" (1 778 m), weight 84 3 kg (185 lb 13 6 oz), SpO2 90 %  ,Body mass index is 26 67 kg/m²  Recommendations written as orders  Wound care will sign off    Nancy Tolliver RN BSN CWON

## 2019-02-06 NOTE — UTILIZATION REVIEW
Initial Clinical Review    Admission: Date/Time/Statement: 2/5/19 @ 1236   Orders Placed This Encounter   Procedures    Inpatient Admission (expected length of stay for this patient Order details is greater than two midnights)     Standing Status:   Standing     Number of Occurrences:   1     Order Specific Question:   Admitting Physician     Answer:   Krystyna Miles [[id number]]     Order Specific Question:   Level of Care     Answer:   Med Surg [[id number]]     Order Specific Question:   Estimated length of stay     Answer:   More than 2 Midnights     Order Specific Question:   Certification     Answer:   I certify that inpatient services are medically necessary for this patient for a duration of greater than two midnights  See H&P and MD Progress Notes for additional information about the patient's course of treatment  ED: Date/Time/Mode of Arrival:   ED Arrival Information     Expected Arrival Acuity Means of Arrival Escorted By Service Admission Type    - 2/5/2019 10:22 [facility] [address] Urgent    Arrival Complaint    FALL        Chief Complaint:   Chief Complaint   Patient presents with    Pain     Pain in L chest area after fall yesterday, landed on "butt"  "tried to get up and felt a tight pull"  still hurting on and off     History of Illness: 82 yo male to ED w/ weakness and fall   Marvene Zhao last night while trying to get out of WC   Appeared weaker then baseline per daughter  PE : coarse BS holden , edema ,Bilateral lower extremity chronic venous stasis changes with scaling, extremely foul odor, greenish/serous discharge from dorsum of both feet with weeping lesions and bleeding shin ulcer on the right leg    Rash   ED Vital Signs:   ED Triage Vitals [02/05/19 1030]   Temperature Pulse Respirations Blood Pressure SpO2   97 6 °F (36 4 °C) 95 (!) 26 146/86 94 %      Temp Source Heart Rate Source Patient Position - Orthostatic VS BP Location FiO2 (%)   Oral Monitor Sitting Right arm -- Pain Score       2        Wt Readings from Last 1 Encounters:   02/05/19 84 3 kg (185 lb 13 6 oz)     Vital Signs (abnormal):  wnl   Pertinent Labs/Diagnostic Test Results: lactic acid   2 6, BNP   9169, BUN creat   17  1 48, total prot  8 3, alb   2 7, total bili   1 10, pt inr   25 7  2 38, ptt  44  CT chest -    1   Acute fracture of the manubrium   No acute rib fractures  2   Patchy density in the left lower lobe, atelectasis versus pneumonia  3   Paraseptal and centrilobular emphysema  EKG- RBBB    ED Treatment:   Medication Administration from 02/05/2019 1022 to 02/05/2019 1411       Date/Time Order Dose Route Action Action by Comments     02/05/2019 1357 sodium chloride 0 9 % bolus 1,000 mL 0 mL Intravenous Stopped Sg Corado RN      02/05/2019 1111 sodium chloride 0 9 % bolus 1,000 mL 1,000 mL Intravenous New Bag Sg Corado RN         Past Medical/Surgical History:    Active Ambulatory Problems     Diagnosis Date Noted    Type 2 diabetes mellitus with skin complication, without long-term current use of insulin ([place] [address] ) 11/29/2017    GERD (gastroesophageal reflux disease) 11/29/2017    BPH (benign prostatic hyperplasia) 11/29/2017    Atrial fibrillation ([place] [address] ) 11/29/2017    Venous stasis dermatitis of left lower extremity 11/29/2017    Chronic diastolic congestive heart failure ([place] [address] ) 11/29/2017    CKD (chronic kidney disease) 11/29/2017    Osteoporosis 11/29/2017    Fall 11/29/2017    Shortness of breath 11/29/2017    Cough 11/29/2017    Rhabdomyolysis 11/29/2017    NSTEMI (non-ST elevated myocardial infarction) ([place] [address] ) 11/29/2017    COPD (chronic obstructive pulmonary disease) ([place] [address] ) 11/29/2017    Hypokalemia 11/30/2017    Abnormal chest x-ray 11/30/2017    Pulmonary nodules 12/01/2017    Pneumonia 12/01/2017    Unknown skin lesion 11/06/2018    History of skin cancer 11/06/2018    Screening for skin condition 11/06/2018    Seborrheic keratosis 11/06/2018    Chronic 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 11/06/2018    Squamous cell cancer of skin of left forearm 12/12/2018    Basal cell carcinoma (BCC) of left medial cheek 01/23/2019     Past Medical History:   Diagnosis Date    Abdominal aortic aneurysm (AAA) (HCC)     Anticoagulant long-term use     Cardiac disease     CHF (congestive heart failure) (HCC)     Chronic kidney disease     COPD (chronic obstructive pulmonary disease) (HCC)     Diabetes mellitus (HCC)     Disorder of upper respiratory tract     GERD (gastroesophageal reflux disease)     Hyperlipidemia     Hypertension     Morbid [facility]severe[facility] obesity due to excess calories [facility]     Nonmelanoma skin cancer     Osteoporosis     Parkinsons disease, secondary ([facility] )     Pulmonary nodules 12/1/2017    Renal disorder     Renal failure     Renal failure      Admitting Diagnosis: Weakness [R53 1]  Pain [R52]  Congestive heart failure (CHF) (HCC) [I50 9]  Fracture of manubrium [S22 21XA]  Chronic 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 [I87 2]  Age/Sex: 81 y o  male  Assessment/Plan:   82 yo male admitted w/ generalized weakness and fall x2 , lives alone is unsafe DC   PT OT eval   Chronic venous stasis w/ worsening lesion , clean wounds and apply ace wrap , podiatry consult   CTchest - mulitple old holden rib fractures - conservation mngt   CHF - cont home diuretics , I&O , 2gm Na diet   CKD - baseline - and monitor   Afib - rate control- cont toprol   Anticipated Length of Stay:  Patient will be admitted on an Inpatient basis with an anticipated length of stay of  > 2 midnights     Justification for Hospital Stay:  Ambulatory dysfunction, fall, generalized weakness  Admission Orders:  Scheduled Meds:   Current Facility-Administered Medications:  acetaminophen 650 mg Oral Q6H PRN    albuterol 2 5 mg Nebulization Q4H PRN    fluticasone-vilanterol 1 puff Inhalation Daily    furosemide 40 mg Oral BID    insulin lispro 1-6 Units Subcutaneous TID AC    insulin lispro 1-6 Units Subcutaneous HS    ipratropium-albuterol 3 mL Nebulization Q6H    metoprolol succinate 25 mg Oral Daily    pantoprazole 40 mg Oral Early Morning    potassium chloride 40 mEq Oral Daily    pravastatin 40 mg Oral Daily With Dinner    rivaroxaban 15 mg Oral Daily    tamsulosin 0 4 mg Oral Daily With Dinner        Elevate heels off bed  Wound care podiatry consult   I&O   OT PT eval   Cardiac diet   Up w/ assist   Fingerstick ac and hs   2/6 cbc , bmp    BUN creat   19  1 38, [id number]    Wound consult   2/6  Plan:   1  Apply hydraguard to b/l heels, buttocks, ischium and sacrum TID and PRN for prevention and protection  2  Apply skin nourishing cream the entire skin daily for moisture  3  Turn and reposition patient every  2 hours   4  Elevate heels off of bed with pillows to offload pressure   5  Soft care cushion to chair when OOB, if able  6   Podiatry consult is pending for lower extremities

## 2019-02-07 ENCOUNTER — APPOINTMENT (INPATIENT)
Dept: NON INVASIVE DIAGNOSTICS | Facility: HOSPITAL | Age: 84
DRG: 564 | End: 2019-02-07
Payer: MEDICARE

## 2019-02-07 LAB
ANION GAP SERPL CALCULATED.3IONS-SCNC: 8 MMOL/L (ref 4–13)
BASOPHILS # BLD AUTO: 0.06 THOUSANDS/ΜL (ref 0–0.1)
BASOPHILS NFR BLD AUTO: 1 % (ref 0–1)
BUN SERPL-MCNC: 23 MG/DL (ref 5–25)
CALCIUM SERPL-MCNC: 8.7 MG/DL (ref 8.3–10.1)
CHLORIDE SERPL-SCNC: 104 MMOL/L (ref 100–108)
CO2 SERPL-SCNC: 27 MMOL/L (ref 21–32)
CREAT SERPL-MCNC: 1.55 MG/DL (ref 0.6–1.3)
EOSINOPHIL # BLD AUTO: 0.12 THOUSAND/ΜL (ref 0–0.61)
EOSINOPHIL NFR BLD AUTO: 1 % (ref 0–6)
ERYTHROCYTE [DISTWIDTH] IN BLOOD BY AUTOMATED COUNT: 19.6 % (ref 11.6–15.1)
GFR SERPL CREATININE-BSD FRML MDRD: 41 ML/MIN/1.73SQ M
GLUCOSE SERPL-MCNC: 101 MG/DL (ref 65–140)
GLUCOSE SERPL-MCNC: 112 MG/DL (ref 65–140)
GLUCOSE SERPL-MCNC: 122 MG/DL (ref 65–140)
GLUCOSE SERPL-MCNC: 165 MG/DL (ref 65–140)
GLUCOSE SERPL-MCNC: 198 MG/DL (ref 65–140)
HCT VFR BLD AUTO: 43.2 % (ref 36.5–49.3)
HGB BLD-MCNC: 12.7 G/DL (ref 12–17)
IMM GRANULOCYTES # BLD AUTO: 0.05 THOUSAND/UL (ref 0–0.2)
IMM GRANULOCYTES NFR BLD AUTO: 1 % (ref 0–2)
LYMPHOCYTES # BLD AUTO: 1.64 THOUSANDS/ΜL (ref 0.6–4.47)
LYMPHOCYTES NFR BLD AUTO: 16 % (ref 14–44)
MCH RBC QN AUTO: 21.5 PG (ref 26.8–34.3)
MCHC RBC AUTO-ENTMCNC: 29.4 G/DL (ref 31.4–37.4)
MCV RBC AUTO: 73 FL (ref 82–98)
MONOCYTES # BLD AUTO: 0.92 THOUSAND/ΜL (ref 0.17–1.22)
MONOCYTES NFR BLD AUTO: 9 % (ref 4–12)
NEUTROPHILS # BLD AUTO: 7.73 THOUSANDS/ΜL (ref 1.85–7.62)
NEUTS SEG NFR BLD AUTO: 72 % (ref 43–75)
NRBC BLD AUTO-RTO: 0 /100 WBCS
PLATELET # BLD AUTO: 214 THOUSANDS/UL (ref 149–390)
PMV BLD AUTO: 9.6 FL (ref 8.9–12.7)
POTASSIUM SERPL-SCNC: 4.2 MMOL/L (ref 3.5–5.3)
RBC # BLD AUTO: 5.92 MILLION/UL (ref 3.88–5.62)
SODIUM SERPL-SCNC: 139 MMOL/L (ref 136–145)
WBC # BLD AUTO: 10.52 THOUSAND/UL (ref 4.31–10.16)

## 2019-02-07 PROCEDURE — 80048 BASIC METABOLIC PNL TOTAL CA: CPT | Performed by: INTERNAL MEDICINE

## 2019-02-07 PROCEDURE — 97535 SELF CARE MNGMENT TRAINING: CPT

## 2019-02-07 PROCEDURE — 97530 THERAPEUTIC ACTIVITIES: CPT

## 2019-02-07 PROCEDURE — 99233 SBSQ HOSP IP/OBS HIGH 50: CPT | Performed by: INTERNAL MEDICINE

## 2019-02-07 PROCEDURE — 93306 TTE W/DOPPLER COMPLETE: CPT | Performed by: INTERNAL MEDICINE

## 2019-02-07 PROCEDURE — 94640 AIRWAY INHALATION TREATMENT: CPT

## 2019-02-07 PROCEDURE — 94760 N-INVAS EAR/PLS OXIMETRY 1: CPT

## 2019-02-07 PROCEDURE — 82948 REAGENT STRIP/BLOOD GLUCOSE: CPT

## 2019-02-07 PROCEDURE — 85025 COMPLETE CBC W/AUTO DIFF WBC: CPT | Performed by: INTERNAL MEDICINE

## 2019-02-07 PROCEDURE — 93306 TTE W/DOPPLER COMPLETE: CPT

## 2019-02-07 RX ORDER — POTASSIUM CHLORIDE 20 MEQ/1
20 TABLET, EXTENDED RELEASE ORAL DAILY
Status: DISCONTINUED | OUTPATIENT
Start: 2019-02-07 | End: 2019-02-08 | Stop reason: HOSPADM

## 2019-02-07 RX ORDER — FUROSEMIDE 40 MG/1
40 TABLET ORAL DAILY
Status: DISCONTINUED | OUTPATIENT
Start: 2019-02-07 | End: 2019-02-08 | Stop reason: HOSPADM

## 2019-02-07 RX ADMIN — POTASSIUM CHLORIDE 20 MEQ: 1500 TABLET, EXTENDED RELEASE ORAL at 09:33

## 2019-02-07 RX ADMIN — PANTOPRAZOLE SODIUM 40 MG: 40 TABLET, DELAYED RELEASE ORAL at 05:03

## 2019-02-07 RX ADMIN — RIVAROXABAN 15 MG: 15 TABLET, FILM COATED ORAL at 09:34

## 2019-02-07 RX ADMIN — FUROSEMIDE 40 MG: 40 TABLET ORAL at 09:34

## 2019-02-07 RX ADMIN — METOPROLOL SUCCINATE 25 MG: 25 TABLET, EXTENDED RELEASE ORAL at 09:32

## 2019-02-07 RX ADMIN — INSULIN LISPRO 2 UNITS: 100 INJECTION, SOLUTION INTRAVENOUS; SUBCUTANEOUS at 21:38

## 2019-02-07 RX ADMIN — ALBUTEROL SULFATE 2.5 MG: 2.5 SOLUTION RESPIRATORY (INHALATION) at 05:17

## 2019-02-07 RX ADMIN — INSULIN LISPRO 1 UNITS: 100 INJECTION, SOLUTION INTRAVENOUS; SUBCUTANEOUS at 12:37

## 2019-02-07 RX ADMIN — PRAVASTATIN SODIUM 40 MG: 40 TABLET ORAL at 16:57

## 2019-02-07 RX ADMIN — TAMSULOSIN HYDROCHLORIDE 0.4 MG: 0.4 CAPSULE ORAL at 16:57

## 2019-02-07 RX ADMIN — FLUTICASONE FUROATE AND VILANTEROL TRIFENATATE 1 PUFF: 200; 25 POWDER RESPIRATORY (INHALATION) at 09:36

## 2019-02-07 NOTE — CONSULTS
Consultation - Ruma Etienne 81 y o  male MRN: [id number]  Unit/Bed#: -01 Encounter: [id number]    Assessment/Plan     Assessment:  -Status post fall causing acute fracture of the manubrium  -Gait difficulty and ADL difficulty due to intractable pain from the manubrium when using the upper extremities including for ambulation requiring upper extremity assistive devices  -Diabetic Polyneuropathy    Plan:  I will continue physical therapy and occupational therapy  Unfortunately he is having manubrium pain particularly when he does upper extremity weight bearing type of activities  He will have sternal precautions and I will also order figure of 8 splinting/bracing of the shoulders to more properly align the sternoclavicular joint  At this point she is a candidate for short-term rehabilitation however, I believe more appropriately at a supervised nursing facility setting  He may not tolerate the activity level required for acute rehabilitation level  History of Present Illness   HPI:  Mr Oscar Javed is an 6-year-old right-handed  gentleman who, prior to admission resides alone and was independent in ambulation and ADLs using a straight cane  He was admitted to [address] on February 5, 2019 when the evening before she ended up falling  According to the documentation apparently he was try to get out of his wheelchair and he fell  Patient states that he was walking with a cane and ended up tripping on object in the floor  According to documentation apparently he uses also roller walker  He also came in with some shortness of breath as well as this complaint of generalized weakness  He also is complaining of anterior upper sternal pains typically when he states that he try to walk with a walker and upper extremity movement  CT scan of the chest reportedly showed acute fracture of the manubrium, but no acute rib fractures   New was also reported that she density in the left lower lobe, felt be atelectasis versus pneumonia  It also showed paraseptal and centrilobular femphysema  Labs also showed findings consistent with chronic kidney disease stage III  He was seen by physical therapy requiring moderate assistance for transfers and bed mobility but could not get up for ambulation  He was seen by occupational therapy requiring moderate to maximal assistance for ADLs  He has difficulty with using his upper extremities because it causes a sternal chest pains  Because of these problems he is now been referred to me by Dr Radha Griffiths for physical medicine rehabilitation consultation, evaluation and treatment       Inpatient consult to Physical Medicine Rehab  Consult performed by: Huber Richey  Consult ordered by: Tessa Colin          Review of Systems   Constitutional: Positive for fatigue  Negative for chills, diaphoresis and fever  HENT: Negative for congestion, drooling, ear discharge, ear pain, mouth sores and nosebleeds  Eyes: Negative for photophobia, pain, discharge, redness, itching and visual disturbance  Respiratory: Negative for choking, wheezing and stridor  Gastrointestinal: Negative for abdominal distention, abdominal pain, nausea, rectal pain and vomiting  Endocrine: Negative for cold intolerance, heat intolerance and polyuria  Genitourinary: Negative for difficulty urinating, discharge, dysuria, flank pain, genital sores, penile pain, penile swelling, scrotal swelling and testicular pain  Musculoskeletal: Positive for gait problem  Negative for back pain and neck pain  Skin: Negative for color change and pallor  Allergic/Immunologic: Negative for environmental allergies and immunocompromised state  Neurological: Negative for tremors, seizures, syncope, facial asymmetry, speech difficulty, numbness and headaches  Hematological: Does not bruise/bleed easily     Psychiatric/Behavioral: Negative for agitation, behavioral problems, confusion, dysphoric mood, hallucinations and self-injury  The patient is not nervous/anxious and is not hyperactive          Historical Information   Past Medical History:   Diagnosis Date    [facility] (AAA) ([facility] )     Anticoagulant long-term use     Cardiac disease     [facility]     Chronic kidney disease     COPD (chronic obstructive pulmonary disease) (HCC)     Diabetes mellitus ([facility])     Disorder of upper respiratory tract     GERD (gastroesophageal reflux disease)     Hyperlipidemia     Hypertension     Morbid (severe) obesity due to excess calories ([place] [address] )     Nonmelanoma skin cancer     last assessed 6/1/17     Osteoporosis     Parkinsons disease, secondary ([place] [address] )     Pulmonary nodules 12/1/2017    Renal disorder     Renal failure     last assessed 2/28/14    Renal failure     last assessed 2/28/14      Past Surgical History:   Procedure Laterality Date    CORONARY ANGIOPLASTY      CORONARY ANGIOPLASTY WITH STENT PLACEMENT      EYE SURGERY       Social History   History   Alcohol Use No     History   Drug Use No     History   Smoking Status    Former Smoker    Packs/day: 2 00    Quit date: 11/29/1964   Smokeless Tobacco    Never Used     Comment: non smoker tobacoo use per [employer]script        Home Setup: he was living at home alone ambulating with roll walk or straight cane  Functional Status Prior to Admission: as noted above  Functional Status on Admission: requires moderate to maximal assistance    Family History   Problem Relation Age of Onset    Heart attack Mother         acute    Heart attack Father         acute    Emphysema Father     Hypertension Father        Meds/Allergies allergies are reviewed  current meds:   Current Facility-Administered Medications   Medication Dose Route Frequency    acetaminophen (TYLENOL) tablet 650 mg  650 mg Oral Q6H PRN    albuterol inhalation solution 2 5 mg  2 5 mg Nebulization Q4H PRN    fluticasone-vilanterol (BREO ELLIPTA) 200-25 MCG/INH inhaler 1 puff  1 puff Inhalation Daily    furosemide (LASIX) tablet 40 mg  40 mg Oral BID    insulin lispro (HumaLOG) 100 units/mL subcutaneous injection 1-6 Units  1-6 Units Subcutaneous TID AC    insulin lispro (HumaLOG) 100 units/mL subcutaneous injection 1-6 Units  1-6 Units Subcutaneous HS    metoprolol succinate (TOPROL-XL) 24 hr tablet 25 mg  25 mg Oral Daily    pantoprazole (PROTONIX) EC tablet 40 mg  40 mg Oral Early Morning    potassium chloride (K-DUR,KLOR-CON) CR tablet 40 mEq  40 mEq Oral Daily    pravastatin (PRAVACHOL) tablet 40 mg  40 mg Oral Daily With Dinner    rivaroxaban (XARELTO) tablet 15 mg  15 mg Oral Daily    tamsulosin (FLOMAX) capsule 0 4 mg  0 4 mg Oral Daily With Dinner     Allergies   Allergen Reactions    Cephalexin        Objective   Vitals: Blood pressure 157/69, pulse 64, temperature 97 9 °F (36 6 °C), temperature source Oral, resp  rate 18, height 5' 10" (1 778 m), weight 84 3 kg (185 lb 13 6 oz), SpO2 96 %  Invasive Devices     Peripheral Intravenous Line            Peripheral IV 02/06/19 Right Arm less than 1 day                Physical Exam   Constitutional: He appears well-developed and well-nourished  HENT:   Head: Normocephalic and atraumatic  Eyes: Pupils are equal, round, and reactive to light  Conjunctivae and EOM are normal  No scleral icterus  Neck: Neck supple  Cervical spinal region flexed with limited extension   Cardiovascular: Regular rhythm  Pulmonary/Chest: Effort normal and breath sounds normal    Tenderness noted on the manubrium on palpation   Musculoskeletal:   Motor examination of both upper extremities: Normal bulk and tone  Muscle strength 4/5 bilateral shoulder abductors/flexors  Muscle strength 5/5 bilateral elbow flexors/extensors  Muscle strength 5/5 bilateral   No upper extremity drift noted on outstretched arms   Finger to nose, finger to nose to finger grossly intact bilaterally  Motor examination of both lower extremities: Normal bulk and tone  Muscle strength is 3+/5 bilateral hip flexors  Muscle strength is 5/5 bilateral knee extensors  Muscle strength is 5/5 bilateral ankle dorsiflexors  Mild distal edema noted  Neurological:   Mental status: Alert awake oriented to person place and time  Able to follow one and two-step commands well  Able to give good history  Speech was fluent without any paraphasic errors  Cranial nerves: Visual fields are full  PERRLA, EOMI  Facial expression and sensation symmetrical  Tongue protruded in midline without fasciculation  Deep tendon reflexes: Bilateral biceps jerks, triceps jerks and brachioradialis jerks 2+  Bilateral knee jerks 2+  Bilateral ankle jerks 1+  No ankle clonus noted bilaterally  Plantar reflexes bilaterally flexus  Sensory examination: Diminished pinprick sensation noted stopping distribution       Skin: Skin is warm  Bilateral lower extremity hemostasis dermal changes with lower extremity dressing on   Psychiatric: He has a normal mood and affect  His behavior is normal        Lab Results: laboratory results were all reviewed    Imaging: imaging report was reviewed  EKG, Pathology, and Other Studies: results were reviewed  Code Status: Level 1 - Full Code  Advance Directive and Living Will: Yes    Power of :    POLST:      Counseling / Coordination of Care  Total floor / unit time spent today 40 minutes minutes  Greater than 50% of total time was spent with the patient and / or family counseling and / or coordination of care  A description of the counseling / coordination of care: discharge planning

## 2019-02-07 NOTE — PHYSICIAN ADVISOR
Current patient class: Inpatient  The patient is currently on Hospital Day: 3 at [address]      The patient was admitted to the hospital at [phone] on 2/5/19 for the following diagnosis:  Weakness [R53 1]  Pain [R52]  Congestive heart failure (CHF) (HCC) [I50 9]  Fracture of manubrium [S22 21XA]  Chronic 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 [I87 2]       There is documentation in the medical record of an expected length of stay of at least 2 midnights  The patient is therefore expected to satisfy the 2 midnight benchmark and given the 2 midnight presumption is appropriate for INPATIENT ADMISSION  Given this expectation of a satisfying stay, CMS instructs us that the patient is most often appropriate for inpatient admission under part A provided medical necessity is documented in the chart  After review of the relevant documentation, labs, vital signs and test results, the patient is appropriate for INPATIENT ADMISSION  Admission to the hospital as an inpatient is a complex decision making process which requires the practitioner to consider the patients presenting complaint, history and physical examination and all relevant testing  With this in mind, in this case, the patient was deemed appropriate for INPATIENT ADMISSION  After review of the documentation and testing available at the time of the admission I concur with this clinical determination of medical necessity  Rationale is as follows: The patient is a 81 yrs old Male who presented to the ED at 2/5/2019 10:23 AM with a chief complaint of Pain (Pain in L chest area after fall yesterday, landed on "butt"  "tried to get up and felt a tight pull"  still hurting on and off)     Given the need for further hospitalization, and along with the documentation of medical necessity present in the chart, the patient is appropriate for inpatient admission    The patient is expected to satisfy the 2 midnight benchmark, and will require further acute medical care  The patient does have comorbid conditions which increases the risk for significant adverse outcome  Given this the patient is appropriate for inpatient admission        The patients vitals on arrival were ED Triage Vitals [02/05/19 1030]   Temperature Pulse Respirations Blood Pressure SpO2   97 6 °F (36 4 °C) 95 (!) 26 146/86 94 %      Temp Source Heart Rate Source Patient Position - Orthostatic VS BP Location FiO2 (%)   Oral Monitor Sitting Right arm --      Pain Score       2           Past Medical History:   Diagnosis Date    Abdominal aortic aneurysm (AAA) (HCC)     Anticoagulant long-term use     Cardiac disease     CHF (congestive heart failure) (HCC)     Chronic kidney disease     COPD (chronic obstructive pulmonary disease) (HCC)     Diabetes mellitus (HCC)     Disorder of upper respiratory tract     GERD (gastroesophageal reflux disease)     Hyperlipidemia     Hypertension     Morbid (severe) obesity due to excess calories (HCC)     Nonmelanoma skin cancer     last assessed 6/1/17     Osteoporosis     Parkinsons disease, secondary ([facility] )     Pulmonary nodules 12/1/2017    Renal disorder     Renal failure     last assessed 2/28/14    Renal failure     last assessed 2/28/14      Past Surgical History:   Procedure Laterality Date    CORONARY ANGIOPLASTY      CORONARY ANGIOPLASTY WITH STENT PLACEMENT      EYE SURGERY             Consults have been placed to:   IP CONSULT TO PODIATRY  IP CONSULT TO WOUND CARE  IP CONSULT TO PHYSICAL MEDICINE REHAB  IP CONSULT TO CASE MANAGEMENT    Vitals:    02/06/19 0253 02/06/19 0700 02/06/19 1500 02/06/19 2339   BP: 136/67 141/62 157/69 147/70   BP Location: Right arm Right arm Right arm Right arm   Pulse: 64 64 64 66   Resp: 19 18 18 19   Temp: 97 5 °F (36 4 °C) 98 3 °F (36 8 °C) 97 9 °F (36 6 °C) 98 1 °F (36 7 °C)   TempSrc: Oral Oral Oral Oral   SpO2: 92% 90% 96%    Weight:       Height:           Most recent labs:    Recent Labs      02/05/19   1107  02/06/19   0612   WBC  9 16  9 43   HGB  13 5  12 3   HCT  46 2  40 3   PLT  230  199   K  4 8  4 6   CALCIUM  9 5  8 4   BUN  17  19   CREATININE  1 48*  1 38*   INR  2 38*   --    TROPONINI  0 04   --    AST  21   --    ALT  12   --    ALKPHOS  110   --        Scheduled Meds:  Current Facility-Administered Medications:  acetaminophen 650 mg Oral Q6H PRN Sumanth Fernandez MD   albuterol 2 5 mg Nebulization Q4H PRN uSmanth Fernandez MD   fluticasone-vilanterol 1 puff Inhalation Daily Sumanth Fernandez MD   furosemide 40 mg Oral BID Sumanth Fernandez MD   insulin lispro 1-6 Units Subcutaneous TID AC BHANU Aguilar   insulin lispro 1-6 Units Subcutaneous HS BHANU Aguilar   metoprolol succinate 25 mg Oral Daily Sumanth Fernandez MD   pantoprazole 40 mg Oral Early Morning Sumanth Fernandez MD   potassium chloride 40 mEq Oral Daily Sumanth Fernandez MD   pravastatin 40 mg Oral Daily With Dotnae Guzman MD   rivaroxaban 15 mg Oral Daily Sumanth Fernandez MD   tamsulosin 0 4 mg Oral Daily With Dontae Guzman MD     Continuous Infusions:   PRN Meds:   acetaminophen    albuterol    Surgical procedures (if appropriate):

## 2019-02-07 NOTE — CONSULTS
Consult - Podiatry   Sergey Nava 81 y o  male MRN: [id number]  Unit/Bed#: -01 Encounter: [id number]    Assessment/Plan     Assessment:  1 PVD  2 Mycotic nails  3 Venous stasis dermatitis  Plan:  Reduced nails x 10 to viable tissue  General skin precautions  Recommend moisturizing cream BID to b/l lower legs  F/u 2 mos with myself    History of Present Illness     HPI:  Sergey Nava is a 81 y o  male who presents with b/l lower leg skin changes and painful nails   Known from my office  Consults  Review of Systems   Constitutional: Negative  HENT: Negative  Eyes: Negative  Respiratory: Negative  Cardiovascular: Negative  Gastrointestinal: Negative  Musculoskeletal: Negative   Skin:dry skin nails discolored   Neurological: Negative  Psych: negative         Historical Information   Past Medical History:   Diagnosis Date    Abdominal aortic aneurysm (AAA) (HCC)     Anticoagulant long-term use     Cardiac disease     CHF (congestive heart failure) (HCC)     Chronic kidney disease     COPD (chronic obstructive pulmonary disease) (HCC)     Diabetes mellitus (HCC)     Disorder of upper respiratory tract     GERD (gastroesophageal reflux disease)     Hyperlipidemia     Hypertension     Morbid (severe) obesity due to excess calories ([address] )     Nonmelanoma skin cancer     last assessed 6/1/17     Osteoporosis     [facility]insons disease, secondary [facility] [address] )     Pulmonary nodules 12/1/2017    Renal disorder     Renal failure     last assessed 2/28/14    Renal failure     last assessed 2/28/14      Past Surgical History:   Procedure Laterality Date    CORONARY ANGIOPLASTY      CORONARY ANGIOPLASTY WITH STENT PLACEMENT      EYE SURGERY       Social History   History   Alcohol Use No     History   Drug Use No     History   Smoking Status    Former Smoker    Packs/day: 2 00    Quit date: 11/29/1964   Smokeless Tobacco    Never Used     Comment: non smoker tobacoo use per allscript Family History:   Family History   Problem Relation Age of Onset    Heart attack Mother         acute    Heart attack Father         acute    Emphysema Father     Hypertension Father        Meds/Allergies   Prescriptions Prior to Admission   Medication    fluticasone-salmeterol (ADVAIR DISKUS) 250-50 mcg/dose inhaler    furosemide (LASIX) 40 mg tablet    Glucose Blood (FREESTYLE LITE TEST VI)    ipratropium (ATROVENT) 0 02 % nebulizer solution    KLOR-CON M20 20 MEQ tablet    Lancets (FREESTYLE) lancets    Lancets (FREESTYLE) lancets    levalbuterol (XOPENEX) 1 25 mg/0 5 mL nebulizer solution    magnesium chloride-calcium (SLOW-MAG) 71 5-119 mg    metFORMIN (GLUCOPHAGE-XR) 500 mg 24 hr tablet    metFORMIN (GLUCOPHAGE-XR) 500 mg 24 hr tablet    metoprolol succinate (TOPROL-XL) 25 mg 24 hr tablet    omeprazole (PriLOSEC) 20 mg delayed release capsule    potassium chloride (KLOR-CON M20) 20 mEq tablet    rivaroxaban (XARELTO) 15 mg tablet    simvastatin (ZOCOR) 20 mg tablet    tamsulosin (FLOMAX) 0 4 mg     Allergies   Allergen Reactions    Cephalexin        Objective   First Vitals:   Blood Pressure: 146/86 (02/05/19 1030)  Pulse: 95 (02/05/19 1030)  Temperature: 97 6 °F (36 4 °C) (02/05/19 1030)  Temp Source: Oral (02/05/19 1030)  Respirations: (!) 26 (02/05/19 1030)  Height: 5' 10" (177 8 cm) (02/05/19 1030)  Weight - Scale: 84 3 kg (185 lb 13 6 oz) (02/05/19 1030)  SpO2: 94 % (02/05/19 1030)    Current Vitals:   Blood Pressure: 147/70 (02/06/19 2339)  Pulse: 66 (02/06/19 2339)  Temperature: 98 1 °F (36 7 °C) (02/06/19 2339)  Temp Source: Oral (02/06/19 2339)  Respirations: 19 (02/06/19 2339)  Height: 5' 10" (177 8 cm) (02/05/19 1030)  Weight - Scale: 84 3 kg (185 lb 13 6 oz) (02/05/19 1030)  SpO2: 90 % (02/07/19 0518)        /70 (BP Location: Right arm)   Pulse 66   Temp 98 1 °F (36 7 °C) (Oral)   Resp 19   Ht 5' 10" (1 778 m)   Wt 84 3 kg (185 lb 13 6 oz)   SpO2 90%   BMI 26 67 kg/m²      General Appearance:    Alert, cooperative, no distress   Head:    Normocephalic, without obvious abnormality, atraumatic   Eyes:    PERRL, conjunctiva/corneas clear, EOM's intact        Nose:   Moist mucous membranes   Neck:   Supple, symmetrical, trachea midline   Back:     Symmetric   Lungs:     Respirations unlabored   Heart:    Regular rate and rhythm, S1 and S2 normal, no murmur, rub   or gallop   Abdomen:     Soft, non-tender   Extremities:   B/L Venous staiss   Pulses:   Dp/ pt 1/4 b/l   Skin:   Venous dermatitis b/l lower legs and feet  Skin intact  Nails are thickened discolored with subungual debris x 10   Neurologic:   Gross sensation is present  Protective sensation is intact             Lab Results:   Admission on 02/05/2019   Component Date Value    WBC 02/05/2019 9 16     RBC 02/05/2019 6 25*    Hemoglobin 02/05/2019 13 5     Hematocrit 02/05/2019 46 2     MCV 02/05/2019 74*    MCH 02/05/2019 21 6*    MCHC 02/05/2019 29 2*    RDW 02/05/2019 19 7*    MPV 02/05/2019 9 1     Platelets 40/05/0339 230     nRBC 02/05/2019 0     Neutrophils Relative 02/05/2019 74     Immat GRANS % 02/05/2019 0     Lymphocytes Relative 02/05/2019 16     Monocytes Relative 02/05/2019 8     Eosinophils Relative 02/05/2019 1     Basophils Relative 02/05/2019 1     Neutrophils Absolute 02/05/2019 6 84     Immature Grans Absolute 02/05/2019 0 03     Lymphocytes Absolute 02/05/2019 1 43     Monocytes Absolute 02/05/2019 0 75     Eosinophils Absolute 02/05/2019 0 05     Basophils Absolute 02/05/2019 0 06     Sodium 02/05/2019 141     Potassium 02/05/2019 4 8     Chloride 02/05/2019 101     CO2 02/05/2019 29     ANION GAP 02/05/2019 11     BUN 02/05/2019 17     Creatinine 02/05/2019 1 48*    Glucose 02/05/2019 117     Calcium 02/05/2019 9 5     AST 02/05/2019 21     ALT 02/05/2019 12     Alkaline Phosphatase 02/05/2019 110     Total Protein 02/05/2019 8 3*    Albumin 02/05/2019 2 7*    Total Bilirubin 02/05/2019 1 10*    eGFR 02/05/2019 43     LACTIC ACID 02/05/2019 2 6*    Protime 02/05/2019 25 7*    INR 02/05/2019 2 38*    PTT 02/05/2019 44*    NT-proBNP 02/05/2019 9169*    Troponin I 02/05/2019 0 04     Color, UA 02/05/2019 Yellow     Clarity, UA 02/05/2019 Clear     Specific Gravity, UA 02/05/2019 1 025     pH, UA 02/05/2019 6 0     Leukocytes, UA 02/05/2019 Negative     Nitrite, UA 02/05/2019 Negative     Protein, UA 02/05/2019 30 (1+)*    Glucose, UA 02/05/2019 Negative     Ketones, UA 02/05/2019 Negative     Urobilinogen, UA 02/05/2019 0 2     Bilirubin, UA 02/05/2019 Negative     Blood, UA 02/05/2019 Small*    RBC, UA 02/05/2019 1-2*    WBC, UA 02/05/2019 0-1*    Epithelial Cells 02/05/2019 Occasional     Bacteria, UA 02/05/2019 None Seen     AMORPH URATES 02/05/2019 Occasional     Procalcitonin 02/05/2019 0 10     TSH 3RD GENERATON 02/05/2019 5 769*    LACTIC ACID 02/05/2019 2 1*    Ventricular Rate 02/05/2019 93     Atrial Rate 02/05/2019 93     NE Interval 02/05/2019 132     QRSD Interval 02/05/2019 140     QT Interval 02/05/2019 426     QTC Interval 02/05/2019 529     P Axis 02/05/2019 94     QRS Axis 02/05/2019 83     T Wave Axis 02/05/2019 144     POC Glucose 02/05/2019 146*    Sodium 02/06/2019 140     Potassium 02/06/2019 4 6     Chloride 02/06/2019 105     CO2 02/06/2019 26     ANION GAP 02/06/2019 9     BUN 02/06/2019 19     Creatinine 02/06/2019 1 38*    Glucose 02/06/2019 101     Calcium 02/06/2019 8 4     eGFR 02/06/2019 47     WBC 02/06/2019 9 43     RBC 02/06/2019 5 53     Hemoglobin 02/06/2019 12 3     Hematocrit 02/06/2019 40 3     MCV 02/06/2019 73*    MCH 02/06/2019 22 2*    MCHC 02/06/2019 30 5*    RDW 02/06/2019 19 4*    Platelets 37/25/6415 199     MPV 02/06/2019 9 7     POC Glucose 02/06/2019 100     POC Glucose 02/06/2019 158*    POC Glucose 02/06/2019 165*    POC Glucose 02/06/2019 154*    Sodium 02/07/2019 139     Potassium 02/07/2019 4 2     Chloride 02/07/2019 104     CO2 02/07/2019 27     ANION GAP 02/07/2019 8     BUN 02/07/2019 23     Creatinine 02/07/2019 1 55*    Glucose 02/07/2019 101     Calcium 02/07/2019 8 7     eGFR 02/07/2019 41     WBC 02/07/2019 10 52*    RBC 02/07/2019 5 92*    Hemoglobin 02/07/2019 12 7     Hematocrit 02/07/2019 43 2     MCV 02/07/2019 73*    MCH 02/07/2019 21 5*    MCHC 02/07/2019 29 4*    RDW 02/07/2019 19 6*    MPV 02/07/2019 9 6     Platelets 03/59/1871 214     nRBC 02/07/2019 0     Neutrophils Relative 02/07/2019 72     Immat GRANS % 02/07/2019 1     Lymphocytes Relative 02/07/2019 16     Monocytes Relative 02/07/2019 9     Eosinophils Relative 02/07/2019 1     Basophils Relative 02/07/2019 1     Neutrophils Absolute 02/07/2019 7 73*    Immature Grans Absolute 02/07/2019 0 05     Lymphocytes Absolute 02/07/2019 1 64     Monocytes Absolute 02/07/2019 0 92     Eosinophils Absolute 02/07/2019 0 12     Basophils Absolute 02/07/2019 0 06     POC Glucose 02/07/2019 112                    Invalid input(s): LABAEARO            Imaging: I have personally reviewed pertinent films in PACS  EKG, Pathology, and Other Studies: I have personally reviewed pertinent reports        Code Status: Level 1 - Full Code  Advance Directive and Living Will: Yes    Power of :    POLST:

## 2019-02-07 NOTE — SOCIAL WORK
CM name and role reviewed  Discharge Checklist reviewed and CM will continue to monitor for progress toward discharge goals in nursing and provider rounds  Marium is 78  CM met with pt at bedside  Pt alert  Pt reported that he lives alone in an one floor set up apartment  He stated that he has been ADL independent  He said that he uses a straight cane  He said that he has used [facility] in the past  CM discussed STR recommendation  Pt stated that he has been to [facility] in the past  He acknowledged the understanding that he has to go to STR at this time, but would like CM to discuss further with daughter about STR options  He stated that he uses [facility] on [address] [place] [address]  in [place]  Pt stated that his daughter Edwin Resendiz is POA  He stated that she provides transportation  CM reviewed discharge planning process including the following: identifying help at home, patient preference for discharge planning needs, pharmacy preference, and availability of treatment team to discuss questions or concerns patient and/or family may have regarding understanding medications and recognizing signs and symptoms once discharged  CM also encouraged patient to follow up with all recommended appointments after discharge  Patient advised of importance for patient and family to participate in managing patients medical well being

## 2019-02-07 NOTE — PLAN OF CARE
Problem: DISCHARGE PLANNING - CARE MANAGEMENT  Goal: Discharge to post-acute care or home with appropriate resources  INTERVENTIONS:  - Conduct assessment to determine patient/family and health care team treatment goals, and need for post-acute services based on payer coverage, community resources, and patient preferences, and barriers to discharge  - Address psychosocial, clinical, and financial barriers to discharge as identified in assessment in conjunction with the patient/family and health care team  - Arrange appropriate level of post-acute services according to patients   needs and preference and payer coverage in collaboration with the physician and health care team  - Communicate with and update the patient/family, physician, and health care team regarding progress on the discharge plan  - Arrange appropriate transportation to post-acute venues  Outcome: Progressing  CM contacted pt's daughter, Vladimir Bennett via telephone to discuss dcp  CM informed her that the recommendation is STR  She confirmed that pt has been to [facility] and also said pt has been to [address] [place] [address] as well  She stated that she would like pt to go back to [facility]  CM informed her that if pt is medically stable, he will be transport to [facility] tomorrow  CM sent referral to [facility] checking on bed availability

## 2019-02-07 NOTE — PROGRESS NOTES
Progress Note - Azul Kolb 81 y o  male MRN: [id number]  Unit/Bed#: -01 Encounter: [id number]    Subjective:   Roe Caban feels well today, no new complaints, he continues to have pain across his upper chest, exacerbated by movement  He denies any shortness of breath, his cough is at baseline, he denies PND or orthopnea  He denies any fevers or chills  He denies any abdominal pain, nausea or vomiting  His appetite is good  All other ROS are negative  Objective:   Vitals: Blood pressure 120/76, pulse 86, temperature 98 4 °F (36 9 °C), temperature source Oral, resp  rate 20, height 5' 10" (1 778 m), weight 84 3 kg (185 lb 13 6 oz), SpO2 93 %  ,Body mass index is 26 67 kg/m²  SPO2 RA Rest      ED to Hosp-Admission (Current) from 2/5/2019 in [address] [place] [address] 2nd Floor Med Surg Unit   SpO2  93 %   SpO2 Activity  At Rest   O2 Device  Nasal cannula   O2 Flow Rate          I&O:   Intake/Output Summary (Last 24 hours) at 02/07/19 0841  Last data filed at 02/06/19 2245   Gross per 24 hour   Intake              240 ml   Output              350 ml   Net             -110 ml         Physical Exam:       General Appearance:    Alert, cooperative, chronically ill-appearing but no distress   Head:    Normocephalic, without obvious abnormality, atraumatic   Eyes:    PERRL, conjunctiva/corneas clear, EOM's intact       Nose:   Moist mucous membranes, no drainage or sinus tenderness   Throat:   No tenderness, no exudates   Neck:   Supple, symmetrical, trachea midline, no JVD   Lungs:     Lung exam is difficult as positioning is difficult and he has some splinting, he has coarse rhonchi at the left greater than right base with prolonged expiratory phase and poor air exchange, respirations unlabored   Heart:    Distant, regular, 1/6 systolic murmur, no rub   or gallop   Abdomen: Soft, non-tender, positive bowel sounds, no masses, no organomegaly   Extremities:  No pedal edema, calf tenderness    Chronic venous insufficiency changes with stasis dermatitis noted   Neurologic:     CNII-XII intact        Invasive Devices     Peripheral Intravenous Line            Peripheral IV 02/06/19 Right Arm less than 1 day                      Social History  reviewed  Family History   Problem Relation Age of Onset    Heart attack Mother         acute    Heart attack Father         acute    Emphysema Father     Hypertension Father     reviewed    Meds:  Current Facility-Administered Medications   Medication Dose Route Frequency Provider Last Rate Last Dose    acetaminophen (TYLENOL) tablet 650 mg  650 mg Oral Q6H PRN Roxanne Price MD        albuterol inhalation solution 2 5 mg  2 5 mg Nebulization Q4H PRN Roxanne Price MD   2 5 mg at 02/07/19 0517    fluticasone-vilanterol (BREO ELLIPTA) 200-25 MCG/INH inhaler 1 puff  1 puff Inhalation Daily Roxanne Price MD   1 puff at 02/06/19 1007    furosemide (LASIX) tablet 40 mg  40 mg Oral Daily Janis Henry MD        insulin lispro (HumaLOG) 100 units/mL subcutaneous injection 1-6 Units  1-6 Units Subcutaneous TID AC BHANU Cooper   1 Units at 02/06/19 1753    insulin lispro (HumaLOG) 100 units/mL subcutaneous injection 1-6 Units  1-6 Units Subcutaneous HS BHANU Cooper   2 Units at 02/06/19 2129    metoprolol succinate (TOPROL-XL) 24 hr tablet 25 mg  25 mg Oral Daily Roxanne Price MD   25 mg at 02/06/19 1007    pantoprazole (PROTONIX) EC tablet 40 mg  40 mg Oral Early Morning Roxanne Price MD   40 mg at 02/07/19 0503    potassium chloride (K-DUR,KLOR-CON) CR tablet 20 mEq  20 mEq Oral Daily Janis Henry MD        pravastatin (PRAVACHOL) tablet 40 mg  40 mg Oral Daily With Mikie Valladares MD   40 mg at 02/06/19 1753    rivaroxaban (XARELTO) tablet 15 mg  15 mg Oral Daily Roxanne Price MD   15 mg at 02/06/19 1007    tamsulosin (FLOMAX) capsule 0 4 mg  0 4 mg Oral Daily With Mikie Valladares MD   0 4 mg at 02/06/19 1753      Prescriptions Prior to Admission   Medication    fluticasone-salmeterol (ADVAIR DISKUS) 250-50 mcg/dose inhaler    furosemide (LASIX) 40 mg tablet    Glucose Blood (FREESTYLE LITE TEST VI)    ipratropium (ATROVENT) 0 02 % nebulizer solution    KLOR-CON M20 20 MEQ tablet    Lancets (FREESTYLE) lancets    Lancets (FREESTYLE) lancets    levalbuterol (XOPENEX) 1 25 mg/0 5 mL nebulizer solution    magnesium chloride-calcium (SLOW-MAG) 71 5-119 mg    metFORMIN (GLUCOPHAGE-XR) 500 mg 24 hr tablet    metFORMIN (GLUCOPHAGE-XR) 500 mg 24 hr tablet    metoprolol succinate (TOPROL-XL) 25 mg 24 hr tablet    omeprazole (PriLOSEC) 20 mg delayed release capsule    potassium chloride (KLOR-CON M20) 20 mEq tablet    rivaroxaban (XARELTO) 15 mg tablet    simvastatin (ZOCOR) 20 mg tablet    tamsulosin (FLOMAX) 0 4 mg       Labs:    Results from last 7 days  Lab Units 02/07/19 0457 02/06/19  0612 02/05/19  1107   WBC Thousand/uL 10 52* 9 43 9 16   HEMOGLOBIN g/dL 12 7 12 3 13 5   HEMATOCRIT % 43 2 40 3 46 2   PLATELETS Thousands/uL 214 199 230   NEUTROS PCT % 72  --  74   LYMPHS PCT % 16  --  16   MONOS PCT % 9  --  8   EOS PCT % 1  --  1       Results from last 7 days  Lab Units 02/07/19 0457 02/06/19 0612 02/05/19  1107   POTASSIUM mmol/L 4 2 4 6 4 8   CHLORIDE mmol/L 104 105 101   CO2 mmol/L 27 26 29   BUN mg/dL 23 19 17   CREATININE mg/dL 1 55* 1 38* 1 48*   CALCIUM mg/dL 8 7 8 4 9 5   ALK PHOS U/L  --   --  110   ALT U/L  --   --  12   AST U/L  --   --  21     Lab Results   Component Value Date    TROPONINI 0 04 02/05/2019    TROPONINI 2 37 (H) 11/30/2017    TROPONINI 3 02 (H) 11/29/2017    CKMB 2 1 12/01/2017    CKMB 5 1 (H) 11/30/2017    CKMB 19 9 (H) 11/29/2017    CKTOTAL 536 (H) 12/01/2017    CKTOTAL 1,084 (H) 11/30/2017    CKTOTAL 2,573 (H) 11/29/2017       Results from last 7 days  Lab Units 02/05/19  1107   INR  2 38*     Lab Results   Component Value Date    SPUTUMCULTUR 3+ Growth of Escherichia coli (A) 12/03/2017 SPUTUMCULTUR 3+ Growth of  12/03/2017       Imaging:  No results found for this or any previous visit  Results for orders placed during the hospital encounter of 11/29/17   XR chest pa & lateral    Narrative CHEST     INDICATION:  Patient fell  Cough  COMPARISON:  None    VIEWS:  Frontal and lateral projections    IMAGES:  2    FINDINGS:  Lungs are adequately aerated  Small effusions on lateral view larger on the left and right  Ovoid 3 6 cm density in the right upper lobe of uncertain etiology, may be infectious or neoplastic  Follow-up imaging recommended after treatment  Right basilar infiltrate or atelectasis  Cardiac silhouette is enlarged  Mild vascular congestion  Prominent sean on lateral view likely dilated pulmonary vessels  No significant peribronchial thickening  Atherosclerotic aorta  Bony structures are diffusely demineralized and degenerative  No displaced rib fractures are seen  No pneumothorax  Impression Ovoid 3 6 cm density in the right upper lobe may be infectious or neoplastic  Infiltrate or atelectasis in the right lung base  Small pleural effusions larger on the left than right  Cardiomegaly with mild vascular congestion but without significant interstitial edema  *Follow-up x-ray recommended after treatment (computed tomography recommended if right upper lobe density persists)        Workstation performed: [id number]         VTE Pharmacologic Prophylaxis: Xarelto      Code Status:   Level 1 - Full Code    Assessment:  Principal Problem:    Generalized weakness  Active Problems:    Type 2 diabetes mellitus with skin complication, without long-term current use of insulin (HCC)    Atrial fibrillation (HCC)    Chronic diastolic congestive heart failure (HCC)    CKD (chronic kidney disease)    COPD (chronic obstructive pulmonary disease) (HCC)    Chronic 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    Closed fracture of manubrium    Ambulatory dysfunction    Acute kidney injury ([facility] [address] )  Resolved Problems:    * No resolved hospital problems  *      Plan:  · Fracture of the manubrium secondary to fall secondary to generalized weakness and debility-appreciate Physical Medicine Rehabilitation consultation, await figure of 8 splint for sternoclavicular alignment  Continue with sternal precautions, physical therapy and occupational therapy  Plan for discharge to subacute rehab tomorrow  · Acute kidney injury-creatinine has spiked again after showing some improvement yesterday  Perhaps related to diuretics with pre renal azotemia as patient's diet outside of the hospital includes fast food and take out with considerably more sodium  Will cut the diuretic dose in half, 40 mg once daily  Will cut potassium as well and follow-up basic metabolic panel tomorrow  · Acute on chronic diastolic congestive heart failure as evidenced by elevated pro BNP as well as peripheral edema, there is no significant edema today, elevated BNP may be in part related to kidney injury as well  Will repeat BNP in the a m  To reassess given reduced dose of diuretics  Will repeat echocardiogram as the last study was completed November 2011 with ejection fraction 50% at that time  · Type 2 diabetes mellitus-continue to hold metformin, continue with sliding scale  · Atrial fibrillation-continue Xarelto renally dosed, heart rate is controlled  · Stasis dermatitis-appreciate wound Care and Podiatry recommendations  · COPD without acute exacerbation-continue pulmonary toilet, bronchodilators, oxygen as needed to keep saturations greater than 89%  · Disposition-hopeful for discharge to subacute rehab tomorrow if patient remains stable        Armando Mcallister MD  1/5/2400,2:23 AM

## 2019-02-08 ENCOUNTER — TELEPHONE (OUTPATIENT)
Dept: INTERNAL MEDICINE CLINIC | Facility: CLINIC | Age: 84
End: 2019-02-08

## 2019-02-08 VITALS
HEART RATE: 76 BPM | HEIGHT: 70 IN | RESPIRATION RATE: 18 BRPM | DIASTOLIC BLOOD PRESSURE: 64 MMHG | TEMPERATURE: 98 F | WEIGHT: 185.85 LBS | SYSTOLIC BLOOD PRESSURE: 136 MMHG | BODY MASS INDEX: 26.61 KG/M2 | OXYGEN SATURATION: 91 %

## 2019-02-08 LAB
ANION GAP SERPL CALCULATED.3IONS-SCNC: 7 MMOL/L (ref 4–13)
BASOPHILS # BLD AUTO: 0.06 THOUSANDS/ΜL (ref 0–0.1)
BASOPHILS NFR BLD AUTO: 1 % (ref 0–1)
BUN SERPL-MCNC: 29 MG/DL (ref 5–25)
CALCIUM SERPL-MCNC: 8.2 MG/DL (ref 8.3–10.1)
CHLORIDE SERPL-SCNC: 102 MMOL/L (ref 100–108)
CO2 SERPL-SCNC: 28 MMOL/L (ref 21–32)
CREAT SERPL-MCNC: 1.53 MG/DL (ref 0.6–1.3)
EOSINOPHIL # BLD AUTO: 0.16 THOUSAND/ΜL (ref 0–0.61)
EOSINOPHIL NFR BLD AUTO: 2 % (ref 0–6)
ERYTHROCYTE [DISTWIDTH] IN BLOOD BY AUTOMATED COUNT: 19.2 % (ref 11.6–15.1)
GFR SERPL CREATININE-BSD FRML MDRD: 42 ML/MIN/1.73SQ M
GLUCOSE SERPL-MCNC: 102 MG/DL (ref 65–140)
GLUCOSE SERPL-MCNC: 110 MG/DL (ref 65–140)
HCT VFR BLD AUTO: 40.2 % (ref 36.5–49.3)
HGB BLD-MCNC: 12 G/DL (ref 12–17)
IMM GRANULOCYTES # BLD AUTO: 0.05 THOUSAND/UL (ref 0–0.2)
IMM GRANULOCYTES NFR BLD AUTO: 1 % (ref 0–2)
LYMPHOCYTES # BLD AUTO: 1.46 THOUSANDS/ΜL (ref 0.6–4.47)
LYMPHOCYTES NFR BLD AUTO: 15 % (ref 14–44)
MCH RBC QN AUTO: 21.7 PG (ref 26.8–34.3)
MCHC RBC AUTO-ENTMCNC: 29.9 G/DL (ref 31.4–37.4)
MCV RBC AUTO: 73 FL (ref 82–98)
MONOCYTES # BLD AUTO: 0.8 THOUSAND/ΜL (ref 0.17–1.22)
MONOCYTES NFR BLD AUTO: 8 % (ref 4–12)
NEUTROPHILS # BLD AUTO: 7.42 THOUSANDS/ΜL (ref 1.85–7.62)
NEUTS SEG NFR BLD AUTO: 73 % (ref 43–75)
NRBC BLD AUTO-RTO: 0 /100 WBCS
NT-PROBNP SERPL-MCNC: 3042 PG/ML
PLATELET # BLD AUTO: 202 THOUSANDS/UL (ref 149–390)
PMV BLD AUTO: 9.2 FL (ref 8.9–12.7)
POTASSIUM SERPL-SCNC: 3.9 MMOL/L (ref 3.5–5.3)
RBC # BLD AUTO: 5.53 MILLION/UL (ref 3.88–5.62)
SODIUM SERPL-SCNC: 137 MMOL/L (ref 136–145)
WBC # BLD AUTO: 9.95 THOUSAND/UL (ref 4.31–10.16)

## 2019-02-08 PROCEDURE — 83880 ASSAY OF NATRIURETIC PEPTIDE: CPT | Performed by: INTERNAL MEDICINE

## 2019-02-08 PROCEDURE — 82948 REAGENT STRIP/BLOOD GLUCOSE: CPT

## 2019-02-08 PROCEDURE — 80048 BASIC METABOLIC PNL TOTAL CA: CPT | Performed by: INTERNAL MEDICINE

## 2019-02-08 PROCEDURE — 99239 HOSP IP/OBS DSCHRG MGMT >30: CPT | Performed by: INTERNAL MEDICINE

## 2019-02-08 PROCEDURE — 85025 COMPLETE CBC W/AUTO DIFF WBC: CPT | Performed by: INTERNAL MEDICINE

## 2019-02-08 RX ORDER — POTASSIUM CHLORIDE 20 MEQ/1
20 TABLET, EXTENDED RELEASE ORAL DAILY
Start: 2019-02-08

## 2019-02-08 RX ORDER — FUROSEMIDE 40 MG/1
40 TABLET ORAL DAILY
Start: 2019-02-08

## 2019-02-08 RX ADMIN — METOPROLOL SUCCINATE 25 MG: 25 TABLET, EXTENDED RELEASE ORAL at 08:45

## 2019-02-08 RX ADMIN — FLUTICASONE FUROATE AND VILANTEROL TRIFENATATE 1 PUFF: 200; 25 POWDER RESPIRATORY (INHALATION) at 08:44

## 2019-02-08 RX ADMIN — FUROSEMIDE 40 MG: 40 TABLET ORAL at 08:45

## 2019-02-08 RX ADMIN — POTASSIUM CHLORIDE 20 MEQ: 1500 TABLET, EXTENDED RELEASE ORAL at 08:50

## 2019-02-08 RX ADMIN — PANTOPRAZOLE SODIUM 40 MG: 40 TABLET, DELAYED RELEASE ORAL at 05:38

## 2019-02-08 RX ADMIN — RIVAROXABAN 15 MG: 15 TABLET, FILM COATED ORAL at 08:50

## 2019-02-08 NOTE — SOCIAL WORK
Pt accepted by [facility]  KASHMIR scheduled 1045am BLS  time with SLETS today  KASHMIR contacted Vladimir Bennett to inform of discharge to [facility] with  time of 1045  RN made aware of  time  CM met with pt at bedside  Pt alert  CM reviewed IMM  Pt acknowledged understanding and feels ready to go to rehab  IMM placed in medical records

## 2019-02-08 NOTE — TELEPHONE ENCOUNTER
Patients daughter called requesting to have this order put in and if also possible to get a call back from [facility] # [phone]

## 2019-02-08 NOTE — TELEPHONE ENCOUNTER
Patient is in [facility]  Patient needs to have his legs wrapped or some kind of compression on his legs all day  There is no order in for this   Can dr Taya Campbell do this  thanks

## 2019-02-08 NOTE — DISCHARGE SUMMARY
Discharge Summary - Karyn Henry 81 y o  male MRN: [id number]  Unit/Bed#: -01 Encounter: [id number]    Admission Date:    2/5/2019   Discharge Date:   02/08/19   Admitting Diagnosis:   Weakness [R53 1]  Pain [R52]  Congestive heart failure (CHF) ([address] ) [I50 9]  Fracture of manubrium [S22 21XA]  Chronic 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 [I87 2]  Admitting Provider:   Kaitlyn Blank MD  Discharge Provider:   Kyung Ferguson MD     Primary Care Physician at Discharge:   Kyung Ferguson MD,[phone]    HPI:   Karyn Henry is a 81 y o  male who presents with generalized weakness and fall  Patient's daughter at the bedside provides additional history  Patient apparently fell last night while attempting to get out of his wheelchair  He denies any loss of consciousness, no dizziness or lightheadedness, no chest pain  Patient reports he tripped and fell  When daughter saw him today, she attempted to get him out of his chair but he appeared weaker than his usual baseline  He reports shortness of breath with exertion but denies any orthopnea or PND  He denies any fever or chills  He has a history of chronic lower extremity stasis dermatitis and typically wraps is wounds daily himself  Evaluation of the wound however show that the patient has not been adequately caring for lower extremities  He lives alone and makes his own meals  He denies any pain currently and was not thrilled with having to be admitted to the hospital     Procedures Performed:   Orders Placed This Encounter   Procedures    ED ECG Documentation Only    Straight cath for urine       Hospital Course:   Manubrial fracture secondary to fall  Patient was evaluated by Physical therapy and Physical Medicine and Rehabilitation  He was placed in a figure-of-eight brace and sternal precautions    He will need ongoing physical therapy to help with ambulation as he is not able to uses upper extremity for support and stabilization due to the pain  Generalized weakness is multifactorial related to advanced age and multiple medical comorbidities  There was no evidence for acute infection  He will require ongoing physical therapy and nutritional support  Acute on chronic diastolic congestive heart failure as evidenced by lower extremity edema and elevated N terminal proBNP on admission  Patient was maintained on his outpatient Lasix dose 40 mg twice daily with significant improvement in his lung exam as well as lower extremity edema  His kidney function however declined and Lasix dose was decreased to 40 mg once daily with improvement in filtration rate  Patient has a poor diet with take out and fast food at home and I suspect that contributed to his presenting exacerbation and while he was hospitalized and will be in skilled nursing facility he will be maintaining a low-sodium diet and therefore I feel 40 mg of Lasix once daily should be sufficient  Will follow up BUN and creatinine early next week  Acute on chronic CKD as above    COPD without exacerbation-admission CT showed left lower lobe atelectasis versus infiltrate  He was afebrile with normal white blood cell count and normal procalcitonin so he was treated with incentive spirometry, pulmonary toilet and bronchodilators and did well throughout this admission  He did require supplemental oxygen at 2 L by nasal cannula but compliance was an issue  Type 2 diabetes mellitus-patient is generally well controlled on metformin  GFR is greater than 40 therefore will resume metformin on discharge and follow-up kidney function next week            Current Facility-Administered Medications:     acetaminophen (TYLENOL) tablet 650 mg, 650 mg, Oral, Q6H PRN, Saba Lucero MD    albuterol inhalation solution 2 5 mg, 2 5 mg, Nebulization, Q4H PRN, Saba Lucero MD, 2 5 mg at 02/07/19 0517    fluticasone-vilanterol (BREO ELLIPTA) 200-25 MCG/INH inhaler 1 puff, 1 puff, Inhalation, Daily, Saba Lucero MD, 1 puff at 02/07/19 0936    furosemide (LASIX) tablet 40 mg, 40 mg, Oral, Daily, Marleni Valera MD, 40 mg at 02/07/19 0934    insulin lispro (HumaLOG) 100 units/mL subcutaneous injection 1-6 Units, 1-6 Units, Subcutaneous, TID AC, 1 Units at 02/07/19 1237 **AND** Fingerstick Glucose (POCT), , , TID AC, BHANU Aguilar    insulin lispro (HumaLOG) 100 units/mL subcutaneous injection 1-6 Units, 1-6 Units, Subcutaneous, HS, Rickey Suárez, BHANU, 2 Units at 02/07/19 2138    metoprolol succinate (TOPROL-XL) 24 hr tablet 25 mg, 25 mg, Oral, Daily, Saba Lucero MD, 25 mg at 02/07/19 0932    pantoprazole (PROTONIX) EC tablet 40 mg, 40 mg, Oral, Early Morning, Saba Lucero MD, 40 mg at 02/08/19 0538    potassium chloride (K-DUR,KLOR-CON) CR tablet 20 mEq, 20 mEq, Oral, Daily, Marleni Valera MD, 20 mEq at 02/07/19 0933    pravastatin (PRAVACHOL) tablet 40 mg, 40 mg, Oral, Daily With Dinner, Saba Lucero MD, 40 mg at 02/07/19 1657    rivaroxaban (XARELTO) tablet 15 mg, 15 mg, Oral, Daily, Saba Lucero MD, 15 mg at 02/07/19 0934    tamsulosin (FLOMAX) capsule 0 4 mg, 0 4 mg, Oral, Daily With Dinner, Saba Lucero MD, 0 4 mg at 02/07/19 1657      Consulting Providers   None    Significant Findings, Care, Treatment and Services Provided:   CT chest:IMPRESSION:     1  Acute fracture of the manubrium  No acute rib fractures      2   Patchy density in the left lower lobe, atelectasis versus pneumonia      3   Paraseptal and centrilobular emphysema      The study was marked in EPIC for immediate notification  Echocardiogram:LEFT VENTRICLE:  Ejection fraction was estimated to be 45 %  Septal and inferior hypokinesis  Concentric hypertrophy was present      RIGHT VENTRICLE:  The ventricle was dilated    Estimated peak pressure was at least 40 mmHg      LEFT ATRIUM:  The atrium was mildly to moderately dilated      RIGHT ATRIUM:  The atrium was mildly to moderately dilated      MITRAL VALVE:  There was mild regurgitation      AORTIC VALVE:  There was trace regurgitation      TRICUSPID VALVE:  There was mild regurgitation  Complications:   None  Physical Exam:  General Appearance:    Alert, cooperative, chronically ill-appearing but no distress   Head:    Normocephalic, without obvious abnormality, atraumatic   Eyes:    PERRL, conjunctiva/corneas clear, EOM's intact       Nose:   Moist mucous membranes, no drainage or sinus tenderness   Throat:   No tenderness, no exudates   Neck:   Supple, symmetrical, trachea midline, no JVD   Lungs:     Crackles at left base, rhonchi left base and mid lung field, minimal rhonchi right lung, poor air exchange with prolonged expiratory phase, respirations unlabored   Heart:    Distant, irregular with 1/6 systolic murmur   Abdomen: Soft, non-tender, positive bowel sounds, no masses, no organomegaly   Extremities:  Bilateral chronic venous stasis changes with stasis dermatitis and scaling with no open wounds   Neurologic:     CNII-XII intact      Labs:   Lab Results   Component Value Date    WBC 9 95 02/08/2019    WBC 12 45 (H) 09/21/2015    RBC 5 53 02/08/2019    RBC 6 12 (H) 09/21/2015    HGB 12 0 02/08/2019    HGB 17 3 (H) 09/21/2015    HCT 40 2 02/08/2019    HCT 52 4 (H) 09/21/2015    MCV 73 (L) 02/08/2019    MCV 86 09/21/2015    MCH 21 7 (L) 02/08/2019    MCH 28 3 09/21/2015    RDW 19 2 (H) 02/08/2019    RDW 14 7 09/21/2015     02/08/2019     09/21/2015     Lab Results   Component Value Date    CREATININE 1 53 (H) 02/08/2019    CREATININE 1 19 09/21/2015    BUN 29 (H) 02/08/2019    BUN 11 09/21/2015     09/21/2015    K 3 9 02/08/2019    K 3 9 09/21/2015     02/08/2019     09/21/2015    CO2 28 02/08/2019    CO2 34 (H) 09/21/2015    GLUCOSE 149 (H) 09/21/2015    PROT 7 1 09/21/2015    ALKPHOS 110 02/05/2019    ALKPHOS 85 09/21/2015    ALT 12 02/05/2019    ALT 20 09/21/2015    AST 21 02/05/2019    AST 14 09/21/2015    BILIDIR 0 56 (H) 11/29/2017    BILIDIR 0 23 (H) 09/21/2015       Treatments:  respiratory therapy: O2 and albuterol/atropine nebulizer    Discharge Diagnosis:   Principal Problem:    Generalized weakness  Active Problems:    Type 2 diabetes mellitus with skin complication, without long-term current use of insulin (HCC)    Atrial fibrillation (HCC)    Chronic diastolic congestive heart failure (HCC)    CKD (chronic kidney disease)    COPD (chronic obstructive pulmonary disease) ([facility])    Chronic 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    Closed fracture of manubrium    Ambulatory dysfunction    Acute kidney injury ([facility] [address] )  Resolved Problems:    * No resolved hospital problems  *      Condition at Discharge:   Good     Code Status: Level 1 - Full Code  Advance Directive and Living Will: Received  Power of :    POLST:      Discharge instructions/Information to patient and family:   See after visit summary for information provided to patient and family  Provisions for Follow-Up Care:  See after visit summary for information related to follow-up care and any pertinent home health orders  Disposition:   Skilled nursing facility at  Readmission:   No    Discharge Statement   I spent 38 minutes discharging the patient  This time was spent on the day of discharge  I had direct contact with the patient on the day of discharge  Additional documentation is required if more than 30 minutes were spent on discharge  Greater than 50% of the total time was spent examining patient, answering all patient questions, arranging and discussing plan of care with patient as well as directly providing post-discharge instructions  Additional time then spent on discharge activities  Discharge Medications:  See after visit summary for reconciled discharge medications provided to patient and family        Armando Mcallister MD  2/9/7681,5:93 AM

## 2019-02-08 NOTE — OCCUPATIONAL THERAPY NOTE
Occupational Therapy Treatment Note        Patient Name: Ubaldo Borges  SQWPS'Y Date: 2/8/2019 02/07/19 1510   Restrictions/Precautions   Braces or Orthoses Other (Comment)  (Figure of 8 shoulder strap)   Other Precautions Cardiac/sternal;Hard of hearing;Visual impairment; Fall Risk;Pain   Pain Assessment   Pain Assessment No/denies pain   Pain Score No Pain   Functional Standing Tolerance   Time 2 minutes    Activity transfer training   Comments Patient tolerated static standing with mod assist of 2 for an average of 2 1/2 minutes    Transfers   Sit to Stand 3  Moderate assistance   Additional items Assist x 2; Increased time required;Verbal cues   Stand to Sit 3  Moderate assistance   Additional items Assist x 2;Bedrails; Increased time required;Verbal cues   Additional Comments Completed chair/ bed swap to attain OOB positioning to Recliner   Functional Mobility   Functional Mobility 2  Maximal assistance   Splinting   Location shoulder- figure of 8 strap   Splinting Adjustment;Skin check  (education to patient and Nursing)   Splinting Education Fitting;Donning;Reading; Wear schedule;Precautions   Cognition   Overall Cognitive Status WFL   Arousal/Participation Alert; Responsive; Cooperative   Attention Within functional limits   Orientation Level Oriented X4   Memory Within functional limits   Activity Tolerance   Activity Tolerance Patient tolerated treatment well   Assessment   Assessment Patient participated in Skilled OT session this date with interventions consisting of ADL re training with the use of correct body mechnaics, Energy Conservation techniques, safety awareness and fall prevention techniques,  therapeutic activities to: increase activity tolerance, increase cardiovascular endurance , facilitate proximal cocontraction of limbs and core, increase dynamic sit/ stand balance during functional activity , increase postural control and increase OOB/ sitting tolerance    Patient agreeable to OT treatment session, upon arrival patient was found supine in bed, alert, responsive  and in no apparent distress  In comparison to previous session, patient with improvements in overall activity tolerance, postural control allowing for improved sitting balance and standing tolerance  Patient completed bed to chair transfers with increased standing tolerance time and ability to weight shift on BLE's  Patient was fit by [facility] with figure of 8 shoulder strap, patient and Nursing staff educated on correct application and position of shoulder strap, wearing schedule, and skin checks Patient continues to be functioning below baseline level, occupational performance remains limited secondary to factors listed above and increased risk for falls and injury  From OT standpoint, recommendation at time of d/c would be Short Term Rehab  Patient to benefit from continued Occupational Therapy treatment while in the hospital to address deficits as defined above and maximize level of functional independence with ADLs and functional mobility  Plan   Treatment Interventions ADL retraining;Functional transfer training;UE strengthening/ROM; Endurance training;Patient/family training;Equipment evaluation/education; Fine motor coordination activities; Compensatory technique education;Continued evaluation;Cardiac education;UE splinting; Energy conservation; Activityengagement   Goal Expiration Date 02/22/19   OT Frequency 3-5x/wk   Recommendation   OT Discharge Recommendation Short Term Rehab

## 2019-02-08 NOTE — PROGRESS NOTES
Report given to Anthony Bowen at [facility], including sternal precautions and skin care

## 2019-02-11 ENCOUNTER — TRANSITIONAL CARE MANAGEMENT (OUTPATIENT)
Dept: INTERNAL MEDICINE CLINIC | Facility: CLINIC | Age: 84
End: 2019-02-11

## 2019-03-08 ENCOUNTER — TELEPHONE (OUTPATIENT)
Dept: INTERNAL MEDICINE CLINIC | Facility: CLINIC | Age: 84
End: 2019-03-08

## 2019-03-11 ENCOUNTER — TELEPHONE (OUTPATIENT)
Dept: INTERNAL MEDICINE CLINIC | Facility: CLINIC | Age: 84
End: 2019-03-11

## 2019-03-11 NOTE — TELEPHONE ENCOUNTER
Chantel Camargo from [facility] called, she is requesting a verbal from Dr Matthew for the patient to due PT 2x a week for 4wks for strengthing      Chantel Camargo # [phone]

## 2019-03-15 ENCOUNTER — APPOINTMENT (OUTPATIENT)
Dept: LAB | Facility: CLINIC | Age: 84
End: 2019-03-15
Payer: MEDICARE

## 2019-03-15 ENCOUNTER — OFFICE VISIT (OUTPATIENT)
Dept: INTERNAL MEDICINE CLINIC | Facility: CLINIC | Age: 84
End: 2019-03-15
Payer: MEDICARE

## 2019-03-15 ENCOUNTER — TELEPHONE (OUTPATIENT)
Dept: INTERNAL MEDICINE CLINIC | Facility: CLINIC | Age: 84
End: 2019-03-15

## 2019-03-15 VITALS
HEART RATE: 41 BPM | WEIGHT: 181.4 LBS | OXYGEN SATURATION: 97 % | SYSTOLIC BLOOD PRESSURE: 138 MMHG | DIASTOLIC BLOOD PRESSURE: 58 MMHG | BODY MASS INDEX: 26.03 KG/M2

## 2019-03-15 DIAGNOSIS — N18.9 CHRONIC KIDNEY DISEASE, UNSPECIFIED CKD STAGE: Chronic | ICD-10-CM

## 2019-03-15 DIAGNOSIS — I48.20 CHRONIC ATRIAL FIBRILLATION (HCC): Chronic | ICD-10-CM

## 2019-03-15 DIAGNOSIS — J44.9 CHRONIC OBSTRUCTIVE PULMONARY DISEASE, UNSPECIFIED COPD TYPE (HCC): ICD-10-CM

## 2019-03-15 DIAGNOSIS — E11.620 TYPE 2 DIABETES MELLITUS WITH DIABETIC DERMATITIS, WITHOUT LONG-TERM CURRENT USE OF INSULIN (HCC): ICD-10-CM

## 2019-03-15 DIAGNOSIS — E11.620 TYPE 2 DIABETES MELLITUS WITH DIABETIC DERMATITIS, WITHOUT LONG-TERM CURRENT USE OF INSULIN (HCC): Primary | ICD-10-CM

## 2019-03-15 DIAGNOSIS — I87.2 CHRONIC VENOUS STASIS DERMATITIS OF BOTH LOWER EXTREMITIES: ICD-10-CM

## 2019-03-15 LAB
ANION GAP SERPL CALCULATED.3IONS-SCNC: 3 MMOL/L (ref 4–13)
BASOPHILS # BLD AUTO: 0.04 THOUSANDS/ΜL (ref 0–0.1)
BASOPHILS NFR BLD AUTO: 1 % (ref 0–1)
BUN SERPL-MCNC: 14 MG/DL (ref 5–25)
CALCIUM SERPL-MCNC: 8.8 MG/DL (ref 8.3–10.1)
CHLORIDE SERPL-SCNC: 106 MMOL/L (ref 100–108)
CO2 SERPL-SCNC: 28 MMOL/L (ref 21–32)
CREAT SERPL-MCNC: 1.09 MG/DL (ref 0.6–1.3)
EOSINOPHIL # BLD AUTO: 0.19 THOUSAND/ΜL (ref 0–0.61)
EOSINOPHIL NFR BLD AUTO: 2 % (ref 0–6)
ERYTHROCYTE [DISTWIDTH] IN BLOOD BY AUTOMATED COUNT: 21.9 % (ref 11.6–15.1)
GFR SERPL CREATININE-BSD FRML MDRD: 62 ML/MIN/1.73SQ M
GLUCOSE P FAST SERPL-MCNC: 85 MG/DL (ref 65–99)
HCT VFR BLD AUTO: 41.5 % (ref 36.5–49.3)
HGB BLD-MCNC: 12.1 G/DL (ref 12–17)
IMM GRANULOCYTES # BLD AUTO: 0.02 THOUSAND/UL (ref 0–0.2)
IMM GRANULOCYTES NFR BLD AUTO: 0 % (ref 0–2)
LYMPHOCYTES # BLD AUTO: 1.85 THOUSANDS/ΜL (ref 0.6–4.47)
LYMPHOCYTES NFR BLD AUTO: 23 % (ref 14–44)
MCH RBC QN AUTO: 22.6 PG (ref 26.8–34.3)
MCHC RBC AUTO-ENTMCNC: 29.2 G/DL (ref 31.4–37.4)
MCV RBC AUTO: 78 FL (ref 82–98)
MONOCYTES # BLD AUTO: 0.75 THOUSAND/ΜL (ref 0.17–1.22)
MONOCYTES NFR BLD AUTO: 9 % (ref 4–12)
NEUTROPHILS # BLD AUTO: 5.1 THOUSANDS/ΜL (ref 1.85–7.62)
NEUTS SEG NFR BLD AUTO: 65 % (ref 43–75)
NRBC BLD AUTO-RTO: 0 /100 WBCS
PLATELET # BLD AUTO: 212 THOUSANDS/UL (ref 149–390)
PMV BLD AUTO: 9.9 FL (ref 8.9–12.7)
POTASSIUM SERPL-SCNC: 3.9 MMOL/L (ref 3.5–5.3)
RBC # BLD AUTO: 5.35 MILLION/UL (ref 3.88–5.62)
SODIUM SERPL-SCNC: 137 MMOL/L (ref 136–145)
WBC # BLD AUTO: 7.95 THOUSAND/UL (ref 4.31–10.16)

## 2019-03-15 PROCEDURE — 85025 COMPLETE CBC W/AUTO DIFF WBC: CPT

## 2019-03-15 PROCEDURE — 80048 BASIC METABOLIC PNL TOTAL CA: CPT

## 2019-03-15 PROCEDURE — 36415 COLL VENOUS BLD VENIPUNCTURE: CPT

## 2019-03-15 PROCEDURE — 99214 OFFICE O/P EST MOD 30 MIN: CPT | Performed by: PHYSICIAN ASSISTANT

## 2019-03-15 RX ORDER — ATORVASTATIN CALCIUM 20 MG/1
20 TABLET, FILM COATED ORAL DAILY
COMMUNITY
Start: 2019-03-05 | End: 2019-12-10 | Stop reason: ALTCHOICE

## 2019-03-15 RX ORDER — FLUTICASONE FUROATE AND VILANTEROL 100; 25 UG/1; UG/1
1 POWDER RESPIRATORY (INHALATION) DAILY
Qty: 1 INHALER | Refills: 5 | Status: SHIPPED | OUTPATIENT
Start: 2019-03-15 | End: 2019-04-24 | Stop reason: CLARIF

## 2019-03-15 RX ORDER — IPRATROPIUM BROMIDE AND ALBUTEROL SULFATE 2.5; .5 MG/3ML; MG/3ML
SOLUTION RESPIRATORY (INHALATION)
Refills: 3 | COMMUNITY
Start: 2019-03-08

## 2019-03-15 NOTE — TELEPHONE ENCOUNTER
C/ [facility] [address] CALLED TO LET YOU KNOW PATIENT HAD A MISSED HOME VISIT BECAUSE HE HAD A DRS APPOINTMENT

## 2019-03-15 NOTE — PROGRESS NOTES
Assessment/Plan:  I reviewed all of his hospital data and medication list from rehab  Medicine list has been updated  He feels well has no complaints  He has a wound left lower leg followed by wound management being seen today 1 month follow-up  I reviewed all of his medication list with his daughter  His blood pressure is a bit low but no drop when he stands up will leave his medications the same       Diagnoses and all orders for this visit:    Type 2 diabetes mellitus with diabetic dermatitis, without long-term current use of insulin (HCC)  -     CBC and differential; Future  -     Basic metabolic panel; Future    Chronic obstructive pulmonary disease, unspecified COPD type ([facility] [address] )  -     fluticasone-vilanterol (BREO ELLIPTA) 100-25 mcg/inh inhaler; Inhale 1 puff daily Rinse mouth after use  Chronic atrial fibrillation (HCC)    Chronic 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    Chronic kidney disease, unspecified CKD stage  -     CBC and differential; Future  -     Basic metabolic panel; Future    Other orders  -     atorvastatin (LIPITOR) 20 mg tablet  -     ipratropium-albuterol (DUO-NEB) 0 5-2 5 mg/3 mL nebulizer solution; INHALE 3 ML 4 TIMES A DAY BY NEBULIZATION ROUTE AS NEEDED  No problem-specific Assessment & Plan notes found for this encounter  Subjective:      Patient ID: Ubaldo Borges is a 81 y o  male  He was hospitalized a month ago after weakness confusion fall  He fractured his sternum he had decreased level of consciousness and some confusion  In the hospital he had pneumonia and hypotension requiring fluids  Mental status improved he was treated with antibiotics and a back brace and sent to rehab  He had a previous venous stasis leg ulcer prior to admission and in rehab had a hematoma of his left leg which burst and worsened his wound since then he has been followed by wound care nurses and his wound is healing slowly    History of chronic AFib on anticoagulation stable no chest pain shortness of breath palpitations  Diabetes well controlled with medication chronic renal failure which has stabilized since his discharge  He was a bit anemic at the end of his hospitalization  He has been home from rehab for a week  He manages a lawn independently with help of visiting nurses physical and occupational therapy which come to his house every day  In the hospital he was started on low dose of Lasix and he lost 10 pounds ejection fraction was normal   Since his discharge a week ago he feels well his appetite is good no shortness of breath chest pain dizziness falls      The following portions of the patient's history were reviewed and updated as appropriate:   He has a past medical history of Abdominal aortic aneurysm (AAA) ([facility] [address] ), Anticoagulant long-term use, Cardiac disease, CHF (congestive heart failure) ([facility] [address] ), Chronic kidney disease, COPD (chronic obstructive pulmonary disease) ([facility] [address] ), Diabetes mellitus ([facility] [address] ), Disorder of upper respiratory tract, GERD (gastroesophageal reflux disease), Hyperlipidemia, Hypertension, Morbid (severe) obesity due to excess calories ([facility] [address] ), Nonmelanoma skin cancer, Osteoporosis, Parkinsons disease, secondary ([facility] [address] ), Pulmonary nodules (12/1/2017), Renal disorder, Renal failure, and Renal failure  ,  does not have any pertinent problems on file  ,   has a past surgical history that includes Eye surgery; Coronary angioplasty with stent; and Coronary angioplasty  ,  family history includes Emphysema in his father; Heart attack in his father and mother; Hypertension in his father  ,   reports that he quit smoking about 54 years ago  He smoked 2 00 packs per day  He has never used smokeless tobacco  He reports that he does not drink alcohol or use drugs  ,  is allergic to cephalexin     Current Outpatient Medications   Medication Sig Dispense Refill    atorvastatin (LIPITOR) 20 mg tablet       fluticasone-salmeterol (ADVAIR DISKUS) 250-50 mcg/dose inhaler Inhale 1 puff 2 (two) times a day for 30 days 5 Inhaler 2    furosemide (LASIX) 40 mg tablet Take 1 tablet (40 mg total) by mouth daily      Glucose Blood (FREESTYLE LITE TEST VI) by In Vitro route 2 (two) times a day      ipratropium-albuterol (DUO-NEB) 0 5-2 5 mg/3 mL nebulizer solution INHALE 3 ML 4 TIMES A DAY BY NEBULIZATION ROUTE AS NEEDED  3    Lancets (FREESTYLE) lancets FreeStyle Lancets Miscellaneous  TEST 1 QD   Quantity: 1;  Refills: 5      Randi Dowling ;  Start 15-Ben-2016  Active  100 Miscellaneous Box      Lancets (FREESTYLE) lancets by Does not apply route daily      metFORMIN (GLUCOPHAGE-XR) 500 mg 24 hr tablet TAKE 1 TABLET BY MOUTH TWICE A DAY 90 tablet 3    metoprolol succinate (TOPROL-XL) 25 mg 24 hr tablet TAKE 1 TABLET BY MOUTH EVERY DAY 90 tablet 3    omeprazole (PriLOSEC) 20 mg delayed release capsule TAKE 1 CAPSULE EVERY DAY 90 capsule 3    potassium chloride (KLOR-CON M20) 20 mEq tablet Take 1 tablet (20 mEq total) by mouth daily      rivaroxaban (XARELTO) 15 mg tablet Take 1 tablet by mouth daily      simvastatin (ZOCOR) 20 mg tablet TAKE 1 TABLET BY MOUTH EVERY DAY 90 tablet 0    tamsulosin (FLOMAX) 0 4 mg TAKE 1 CAPSULE BY MOUTH DAILY 90 capsule 3    fluticasone-vilanterol (BREO ELLIPTA) 100-25 mcg/inh inhaler Inhale 1 puff daily Rinse mouth after use  1 Inhaler 5    ipratropium (ATROVENT) 0 02 % nebulizer solution Take 2 5 mL by nebulization 3 (three) times a day 75 mL 0    levalbuterol (XOPENEX) 1 25 mg/0 5 mL nebulizer solution Take 0 5 mL by nebulization 3 (three) times a day 1 each 0    magnesium chloride-calcium (SLOW-MAG) 71 5-119 mg Take by mouth       No current facility-administered medications for this visit  Review of Systems   Constitutional: Negative for activity change, appetite change, chills, diaphoresis, fatigue, fever and unexpected weight change     HENT: Negative for congestion, dental problem, drooling, ear discharge, ear pain, facial swelling, hearing loss, nosebleeds, postnasal drip, rhinorrhea, sinus pressure, sinus pain, sneezing, sore throat, tinnitus, trouble swallowing and voice change  Eyes: Negative for photophobia, pain, discharge, redness, itching and visual disturbance  Respiratory: Negative for apnea, cough, choking, chest tightness, shortness of breath, wheezing and stridor  Cardiovascular: Negative for chest pain, palpitations and leg swelling  Gastrointestinal: Negative for abdominal distention, abdominal pain, anal bleeding, blood in stool, constipation, diarrhea, nausea, rectal pain and vomiting  Endocrine: Negative for cold intolerance, heat intolerance, polydipsia, polyphagia and polyuria  Genitourinary: Negative for decreased urine volume, difficulty urinating, dysuria, enuresis, flank pain, frequency, genital sores, hematuria and urgency  Musculoskeletal: Negative for arthralgias, back pain, gait problem, joint swelling, myalgias, neck pain and neck stiffness  Skin: Negative for color change, pallor, rash and wound  Neurological: Negative for dizziness, tremors, seizures, syncope, facial asymmetry, speech difficulty, weakness, light-headedness, numbness and headaches  Hematological: Negative for adenopathy  Does not bruise/bleed easily  Psychiatric/Behavioral: Negative for agitation, behavioral problems, confusion, decreased concentration, dysphoric mood, hallucinations, self-injury, sleep disturbance and suicidal ideas  The patient is not nervous/anxious and is not hyperactive  Objective:  Vitals:    03/15/19 1308   BP: 138/58   BP Location: Left arm   Patient Position: Sitting   Pulse: (!) 41   SpO2: 97%   Weight: 82 3 kg (181 lb 6 4 oz)     Body mass index is 26 03 kg/m²  Physical Exam   Constitutional: He is oriented to person, place, and time  He appears well-developed  Awake alert oriented using a walker   HENT:   Head: Normocephalic     Right Ear: External ear normal    Left Ear: External ear normal    Mouth/Throat: Oropharynx is clear and moist    Teeth in poor repair   Eyes: Pupils are equal, round, and reactive to light  Conjunctivae are normal    Neck: Neck supple  No JVD present  No thyromegaly present  Cardiovascular: Normal rate and intact distal pulses  Murmur ( soft apical) heard  Irregularly irregular   Pulmonary/Chest: Effort normal and breath sounds normal  No stridor  No respiratory distress  He has no wheezes  He has no rales  He exhibits no tenderness  Decreased breath sounds overall   Abdominal: Soft  Bowel sounds are normal    Musculoskeletal: Normal range of motion  He exhibits edema (1+)  Neurological: He is alert and oriented to person, place, and time  He has normal reflexes  Skin: Skin is warm and dry     Wound left lateral anterior lower leg covered with a bandage

## 2019-04-02 ENCOUNTER — TELEPHONE (OUTPATIENT)
Dept: INTERNAL MEDICINE CLINIC | Facility: CLINIC | Age: 84
End: 2019-04-02

## 2019-04-03 ENCOUNTER — TELEPHONE (OUTPATIENT)
Dept: INTERNAL MEDICINE CLINIC | Facility: CLINIC | Age: 84
End: 2019-04-03

## 2019-04-24 ENCOUNTER — OFFICE VISIT (OUTPATIENT)
Dept: INTERNAL MEDICINE CLINIC | Facility: CLINIC | Age: 84
End: 2019-04-24
Payer: MEDICARE

## 2019-04-24 ENCOUNTER — TELEPHONE (OUTPATIENT)
Dept: INTERNAL MEDICINE CLINIC | Facility: CLINIC | Age: 84
End: 2019-04-24

## 2019-04-24 ENCOUNTER — APPOINTMENT (OUTPATIENT)
Dept: LAB | Facility: CLINIC | Age: 84
End: 2019-04-24
Payer: MEDICARE

## 2019-04-24 VITALS
DIASTOLIC BLOOD PRESSURE: 82 MMHG | BODY MASS INDEX: 26.03 KG/M2 | HEIGHT: 70 IN | HEART RATE: 50 BPM | WEIGHT: 181.8 LBS | OXYGEN SATURATION: 98 % | TEMPERATURE: 97.8 F | SYSTOLIC BLOOD PRESSURE: 132 MMHG

## 2019-04-24 DIAGNOSIS — I21.4 NSTEMI (NON-ST ELEVATED MYOCARDIAL INFARCTION) (HCC): ICD-10-CM

## 2019-04-24 DIAGNOSIS — R74.8 ELEVATED ALKALINE PHOSPHATASE LEVEL: ICD-10-CM

## 2019-04-24 DIAGNOSIS — H02.133 SENILE ECTROPION OF EYELIDS OF BOTH EYES, UNSPECIFIED EYELID: ICD-10-CM

## 2019-04-24 DIAGNOSIS — H02.136 SENILE ECTROPION OF EYELIDS OF BOTH EYES, UNSPECIFIED EYELID: ICD-10-CM

## 2019-04-24 DIAGNOSIS — N18.9 CHRONIC KIDNEY DISEASE, UNSPECIFIED CKD STAGE: Chronic | ICD-10-CM

## 2019-04-24 DIAGNOSIS — R53.1 GENERALIZED WEAKNESS: ICD-10-CM

## 2019-04-24 DIAGNOSIS — I87.2 CHRONIC VENOUS STASIS DERMATITIS OF BOTH LOWER EXTREMITIES: ICD-10-CM

## 2019-04-24 DIAGNOSIS — I48.20 CHRONIC ATRIAL FIBRILLATION (HCC): Chronic | ICD-10-CM

## 2019-04-24 DIAGNOSIS — E03.8 SUBCLINICAL HYPOTHYROIDISM: ICD-10-CM

## 2019-04-24 DIAGNOSIS — E11.8 TYPE 2 DIABETES MELLITUS WITH COMPLICATION, WITHOUT LONG-TERM CURRENT USE OF INSULIN (HCC): Chronic | ICD-10-CM

## 2019-04-24 DIAGNOSIS — R26.2 AMBULATORY DYSFUNCTION: ICD-10-CM

## 2019-04-24 DIAGNOSIS — I87.2 VENOUS STASIS DERMATITIS OF LEFT LOWER EXTREMITY: Chronic | ICD-10-CM

## 2019-04-24 DIAGNOSIS — E11.9 TYPE 2 DIABETES MELLITUS WITHOUT COMPLICATION, WITHOUT LONG-TERM CURRENT USE OF INSULIN (HCC): ICD-10-CM

## 2019-04-24 DIAGNOSIS — J44.9 CHRONIC OBSTRUCTIVE PULMONARY DISEASE, UNSPECIFIED COPD TYPE (HCC): ICD-10-CM

## 2019-04-24 DIAGNOSIS — E11.620 TYPE 2 DIABETES MELLITUS WITH DIABETIC DERMATITIS, WITHOUT LONG-TERM CURRENT USE OF INSULIN (HCC): Primary | ICD-10-CM

## 2019-04-24 LAB
ALBUMIN SERPL BCP-MCNC: 2.6 G/DL (ref 3.5–5)
ALP SERPL-CCNC: 109 U/L (ref 46–116)
ALT SERPL W P-5'-P-CCNC: 16 U/L (ref 12–78)
ANION GAP SERPL CALCULATED.3IONS-SCNC: 1 MMOL/L (ref 4–13)
AST SERPL W P-5'-P-CCNC: 13 U/L (ref 5–45)
BASOPHILS # BLD AUTO: 0.08 THOUSANDS/ΜL (ref 0–0.1)
BASOPHILS NFR BLD AUTO: 1 % (ref 0–1)
BILIRUB SERPL-MCNC: 1.01 MG/DL (ref 0.2–1)
BUN SERPL-MCNC: 15 MG/DL (ref 5–25)
CALCIUM SERPL-MCNC: 8.5 MG/DL (ref 8.3–10.1)
CHLORIDE SERPL-SCNC: 108 MMOL/L (ref 100–108)
CHOLEST SERPL-MCNC: 99 MG/DL (ref 50–200)
CO2 SERPL-SCNC: 30 MMOL/L (ref 21–32)
CREAT SERPL-MCNC: 1.27 MG/DL (ref 0.6–1.3)
EOSINOPHIL # BLD AUTO: 0.22 THOUSAND/ΜL (ref 0–0.61)
EOSINOPHIL NFR BLD AUTO: 3 % (ref 0–6)
ERYTHROCYTE [DISTWIDTH] IN BLOOD BY AUTOMATED COUNT: 20.2 % (ref 11.6–15.1)
EST. AVERAGE GLUCOSE BLD GHB EST-MCNC: 134 MG/DL
GFR SERPL CREATININE-BSD FRML MDRD: 52 ML/MIN/1.73SQ M
GLUCOSE P FAST SERPL-MCNC: 92 MG/DL (ref 65–99)
HBA1C MFR BLD: 6.3 % (ref 4.2–6.3)
HCT VFR BLD AUTO: 42.6 % (ref 36.5–49.3)
HDLC SERPL-MCNC: 40 MG/DL (ref 40–60)
HGB BLD-MCNC: 12.4 G/DL (ref 12–17)
IMM GRANULOCYTES # BLD AUTO: 0.02 THOUSAND/UL (ref 0–0.2)
IMM GRANULOCYTES NFR BLD AUTO: 0 % (ref 0–2)
LDLC SERPL CALC-MCNC: 38 MG/DL (ref 0–100)
LYMPHOCYTES # BLD AUTO: 1.87 THOUSANDS/ΜL (ref 0.6–4.47)
LYMPHOCYTES NFR BLD AUTO: 27 % (ref 14–44)
MCH RBC QN AUTO: 22.9 PG (ref 26.8–34.3)
MCHC RBC AUTO-ENTMCNC: 29.1 G/DL (ref 31.4–37.4)
MCV RBC AUTO: 79 FL (ref 82–98)
MONOCYTES # BLD AUTO: 0.59 THOUSAND/ΜL (ref 0.17–1.22)
MONOCYTES NFR BLD AUTO: 8 % (ref 4–12)
NEUTROPHILS # BLD AUTO: 4.24 THOUSANDS/ΜL (ref 1.85–7.62)
NEUTS SEG NFR BLD AUTO: 61 % (ref 43–75)
NONHDLC SERPL-MCNC: 59 MG/DL
NRBC BLD AUTO-RTO: 0 /100 WBCS
PLATELET # BLD AUTO: 198 THOUSANDS/UL (ref 149–390)
PMV BLD AUTO: 10.8 FL (ref 8.9–12.7)
POTASSIUM SERPL-SCNC: 4.3 MMOL/L (ref 3.5–5.3)
PROT SERPL-MCNC: 7.1 G/DL (ref 6.4–8.2)
RBC # BLD AUTO: 5.42 MILLION/UL (ref 3.88–5.62)
SODIUM SERPL-SCNC: 139 MMOL/L (ref 136–145)
T4 FREE SERPL-MCNC: 1.09 NG/DL (ref 0.76–1.46)
TRIGL SERPL-MCNC: 104 MG/DL
TSH SERPL DL<=0.05 MIU/L-ACNC: 4.93 UIU/ML (ref 0.36–3.74)
WBC # BLD AUTO: 7.02 THOUSAND/UL (ref 4.31–10.16)

## 2019-04-24 PROCEDURE — 83036 HEMOGLOBIN GLYCOSYLATED A1C: CPT

## 2019-04-24 PROCEDURE — 84080 ASSAY ALKALINE PHOSPHATASES: CPT

## 2019-04-24 PROCEDURE — 84443 ASSAY THYROID STIM HORMONE: CPT

## 2019-04-24 PROCEDURE — 84439 ASSAY OF FREE THYROXINE: CPT

## 2019-04-24 PROCEDURE — 80061 LIPID PANEL: CPT

## 2019-04-24 PROCEDURE — 36415 COLL VENOUS BLD VENIPUNCTURE: CPT

## 2019-04-24 PROCEDURE — 99214 OFFICE O/P EST MOD 30 MIN: CPT | Performed by: PHYSICIAN ASSISTANT

## 2019-04-24 PROCEDURE — 80053 COMPREHEN METABOLIC PANEL: CPT

## 2019-04-24 PROCEDURE — 84075 ASSAY ALKALINE PHOSPHATASE: CPT

## 2019-04-24 PROCEDURE — 85025 COMPLETE CBC W/AUTO DIFF WBC: CPT

## 2019-04-24 RX ORDER — ALENDRONATE SODIUM 70 MG/1
70 TABLET ORAL
Qty: 4 TABLET | Refills: 3 | Status: SHIPPED | OUTPATIENT
Start: 2019-04-24 | End: 2019-07-11 | Stop reason: SDUPTHER

## 2019-04-24 RX ORDER — METFORMIN HYDROCHLORIDE 500 MG/1
500 TABLET, EXTENDED RELEASE ORAL
Qty: 90 TABLET | Refills: 3
Start: 2019-04-24

## 2019-04-26 ENCOUNTER — TELEPHONE (OUTPATIENT)
Dept: INTERNAL MEDICINE CLINIC | Facility: CLINIC | Age: 84
End: 2019-04-26

## 2019-04-26 LAB
ALP BONE CFR SERPL: 23 % (ref 12–68)
ALP INTEST CFR SERPL: 12 % (ref 0–18)
ALP LIVER CFR SERPL: 65 % (ref 13–88)
ALP SERPL-CCNC: 107 IU/L (ref 39–117)

## 2019-04-29 ENCOUNTER — TELEPHONE (OUTPATIENT)
Dept: INTERNAL MEDICINE CLINIC | Facility: CLINIC | Age: 84
End: 2019-04-29

## 2019-05-01 ENCOUNTER — TELEPHONE (OUTPATIENT)
Dept: INTERNAL MEDICINE CLINIC | Facility: CLINIC | Age: 84
End: 2019-05-01

## 2019-05-03 ENCOUNTER — TELEPHONE (OUTPATIENT)
Dept: INTERNAL MEDICINE CLINIC | Facility: CLINIC | Age: 84
End: 2019-05-03

## 2019-05-06 ENCOUNTER — TELEPHONE (OUTPATIENT)
Dept: INTERNAL MEDICINE CLINIC | Facility: CLINIC | Age: 84
End: 2019-05-06

## 2019-05-16 ENCOUNTER — OFFICE VISIT (OUTPATIENT)
Dept: INTERNAL MEDICINE CLINIC | Facility: CLINIC | Age: 84
End: 2019-05-16
Payer: MEDICARE

## 2019-05-16 VITALS — HEART RATE: 66 BPM | OXYGEN SATURATION: 91 % | DIASTOLIC BLOOD PRESSURE: 50 MMHG | SYSTOLIC BLOOD PRESSURE: 110 MMHG

## 2019-05-16 DIAGNOSIS — R26.2 AMBULATORY DYSFUNCTION: ICD-10-CM

## 2019-05-16 DIAGNOSIS — S01.01XD LACERATION OF SCALP, SUBSEQUENT ENCOUNTER: ICD-10-CM

## 2019-05-16 DIAGNOSIS — I87.2 CHRONIC VENOUS STASIS DERMATITIS OF BOTH LOWER EXTREMITIES: ICD-10-CM

## 2019-05-16 DIAGNOSIS — M81.0 OSTEOPOROSIS, UNSPECIFIED OSTEOPOROSIS TYPE, UNSPECIFIED PATHOLOGICAL FRACTURE PRESENCE: Chronic | ICD-10-CM

## 2019-05-16 DIAGNOSIS — I87.2 VENOUS STASIS DERMATITIS OF LEFT LOWER EXTREMITY: ICD-10-CM

## 2019-05-16 DIAGNOSIS — E11.620 TYPE 2 DIABETES MELLITUS WITH DIABETIC DERMATITIS, WITHOUT LONG-TERM CURRENT USE OF INSULIN (HCC): Primary | ICD-10-CM

## 2019-05-16 DIAGNOSIS — J44.9 CHRONIC OBSTRUCTIVE PULMONARY DISEASE, UNSPECIFIED COPD TYPE (HCC): ICD-10-CM

## 2019-05-16 DIAGNOSIS — I48.20 CHRONIC ATRIAL FIBRILLATION (HCC): Chronic | ICD-10-CM

## 2019-05-16 PROCEDURE — 99214 OFFICE O/P EST MOD 30 MIN: CPT | Performed by: PHYSICIAN ASSISTANT

## 2019-05-16 PROCEDURE — 1124F ACP DISCUSS-NO DSCNMKR DOCD: CPT | Performed by: PHYSICIAN ASSISTANT

## 2019-05-29 ENCOUNTER — TELEPHONE (OUTPATIENT)
Dept: INTERNAL MEDICINE CLINIC | Facility: CLINIC | Age: 84
End: 2019-05-29

## 2019-06-10 DIAGNOSIS — I48.20 CHRONIC ATRIAL FIBRILLATION (HCC): Primary | ICD-10-CM

## 2019-06-26 ENCOUNTER — TELEPHONE (OUTPATIENT)
Dept: INTERNAL MEDICINE CLINIC | Facility: CLINIC | Age: 84
End: 2019-06-26

## 2019-06-27 DIAGNOSIS — R26.9 NEUROLOGIC GAIT DYSFUNCTION: Primary | ICD-10-CM

## 2019-07-07 DIAGNOSIS — E78.2 MIXED HYPERLIPIDEMIA: ICD-10-CM

## 2019-07-07 RX ORDER — SIMVASTATIN 20 MG
TABLET ORAL
Qty: 90 TABLET | Refills: 0 | Status: SHIPPED | OUTPATIENT
Start: 2019-07-07 | End: 2019-10-04 | Stop reason: SDUPTHER

## 2019-07-11 DIAGNOSIS — I48.20 CHRONIC ATRIAL FIBRILLATION (HCC): Chronic | ICD-10-CM

## 2019-07-11 DIAGNOSIS — I87.2 CHRONIC VENOUS STASIS DERMATITIS OF BOTH LOWER EXTREMITIES: ICD-10-CM

## 2019-07-11 DIAGNOSIS — E11.9 TYPE 2 DIABETES MELLITUS WITHOUT COMPLICATION, WITHOUT LONG-TERM CURRENT USE OF INSULIN (HCC): ICD-10-CM

## 2019-07-11 DIAGNOSIS — H02.133 SENILE ECTROPION OF EYELIDS OF BOTH EYES, UNSPECIFIED EYELID: ICD-10-CM

## 2019-07-11 DIAGNOSIS — H02.136 SENILE ECTROPION OF EYELIDS OF BOTH EYES, UNSPECIFIED EYELID: ICD-10-CM

## 2019-07-11 DIAGNOSIS — R53.1 GENERALIZED WEAKNESS: ICD-10-CM

## 2019-07-11 RX ORDER — ALENDRONATE SODIUM 70 MG/1
TABLET ORAL
Qty: 12 TABLET | Refills: 1 | Status: SHIPPED | OUTPATIENT
Start: 2019-07-11 | End: 2019-12-10 | Stop reason: ALTCHOICE

## 2019-07-30 ENCOUNTER — TELEPHONE (OUTPATIENT)
Dept: INTERNAL MEDICINE CLINIC | Facility: CLINIC | Age: 84
End: 2019-07-30

## 2019-07-30 NOTE — TELEPHONE ENCOUNTER
Fifi Jauregui from [facility] called regarding this patient  She wanted to know if he can continue getting physical therapy at home for the next 4 weeks  Fifi Jauregui needed an OK by Dr Samson Jauregui number phone [phone]

## 2019-07-31 ENCOUNTER — TELEPHONE (OUTPATIENT)
Dept: INTERNAL MEDICINE CLINIC | Facility: CLINIC | Age: 84
End: 2019-07-31

## 2019-07-31 NOTE — TELEPHONE ENCOUNTER
Wound on lower extremity, now it's scattered opening  Would like a verbal for Luzmaria Kong to put calcium algenate and an denice boot and to change it weekly    Please call [phone]

## 2019-08-29 ENCOUNTER — TELEPHONE (OUTPATIENT)
Dept: INTERNAL MEDICINE CLINIC | Facility: CLINIC | Age: 84
End: 2019-08-29

## 2019-08-29 NOTE — TELEPHONE ENCOUNTER
Sylvia Virk from [facility] called in regards to [place]  She stated that he had wounds on his legs prior to yesterday and they did heal but she noticed some blistering going on on his left interior of his ankle   She just wants Dr Sherri Ng to know that  [facility]    Any questions please contact Sylvia Virk at  [phone]

## 2019-08-30 ENCOUNTER — TELEPHONE (OUTPATIENT)
Dept: INTERNAL MEDICINE CLINIC | Facility: CLINIC | Age: 84
End: 2019-08-30

## 2019-10-04 DIAGNOSIS — E78.2 MIXED HYPERLIPIDEMIA: ICD-10-CM

## 2019-10-04 RX ORDER — SIMVASTATIN 20 MG
TABLET ORAL
Qty: 90 TABLET | Refills: 0 | Status: SHIPPED | OUTPATIENT
Start: 2019-10-04 | End: 2020-01-20

## 2019-11-26 DIAGNOSIS — E11.9 TYPE 2 DIABETES MELLITUS WITHOUT COMPLICATION, WITHOUT LONG-TERM CURRENT USE OF INSULIN (HCC): ICD-10-CM

## 2019-11-26 DIAGNOSIS — E11.9 TYPE 2 DIABETES MELLITUS WITHOUT COMPLICATION, WITHOUT LONG-TERM CURRENT USE OF INSULIN (HCC): Primary | ICD-10-CM

## 2019-11-26 DIAGNOSIS — E78.2 MIXED HYPERLIPIDEMIA: ICD-10-CM

## 2019-11-26 DIAGNOSIS — I48.20 CHRONIC ATRIAL FIBRILLATION (HCC): ICD-10-CM

## 2019-11-26 RX ORDER — METFORMIN HYDROCHLORIDE 500 MG/1
TABLET, EXTENDED RELEASE ORAL
Qty: 90 TABLET | Refills: 3 | Status: SHIPPED | OUTPATIENT
Start: 2019-11-26 | End: 2019-12-10 | Stop reason: SDUPTHER

## 2019-12-03 ENCOUNTER — APPOINTMENT (OUTPATIENT)
Dept: LAB | Facility: CLINIC | Age: 84
End: 2019-12-03
Payer: MEDICARE

## 2019-12-03 DIAGNOSIS — E11.9 TYPE 2 DIABETES MELLITUS WITHOUT COMPLICATION, WITHOUT LONG-TERM CURRENT USE OF INSULIN (HCC): ICD-10-CM

## 2019-12-03 DIAGNOSIS — E78.2 MIXED HYPERLIPIDEMIA: ICD-10-CM

## 2019-12-03 DIAGNOSIS — I48.20 CHRONIC ATRIAL FIBRILLATION (HCC): ICD-10-CM

## 2019-12-03 LAB
ALBUMIN SERPL BCP-MCNC: 3.1 G/DL (ref 3.5–5)
ALP SERPL-CCNC: 101 U/L (ref 46–116)
ALT SERPL W P-5'-P-CCNC: 11 U/L (ref 12–78)
ANION GAP SERPL CALCULATED.3IONS-SCNC: 5 MMOL/L (ref 4–13)
AST SERPL W P-5'-P-CCNC: 12 U/L (ref 5–45)
BASOPHILS # BLD AUTO: 0.07 THOUSANDS/ΜL (ref 0–0.1)
BASOPHILS NFR BLD AUTO: 1 % (ref 0–1)
BILIRUB SERPL-MCNC: 0.91 MG/DL (ref 0.2–1)
BUN SERPL-MCNC: 18 MG/DL (ref 5–25)
CALCIUM SERPL-MCNC: 8.9 MG/DL (ref 8.3–10.1)
CHLORIDE SERPL-SCNC: 109 MMOL/L (ref 100–108)
CHOLEST SERPL-MCNC: 100 MG/DL (ref 50–200)
CO2 SERPL-SCNC: 29 MMOL/L (ref 21–32)
CREAT SERPL-MCNC: 1.22 MG/DL (ref 0.6–1.3)
EOSINOPHIL # BLD AUTO: 0.16 THOUSAND/ΜL (ref 0–0.61)
EOSINOPHIL NFR BLD AUTO: 3 % (ref 0–6)
ERYTHROCYTE [DISTWIDTH] IN BLOOD BY AUTOMATED COUNT: 18.8 % (ref 11.6–15.1)
EST. AVERAGE GLUCOSE BLD GHB EST-MCNC: 140 MG/DL
GFR SERPL CREATININE-BSD FRML MDRD: 54 ML/MIN/1.73SQ M
GLUCOSE P FAST SERPL-MCNC: 95 MG/DL (ref 65–99)
HBA1C MFR BLD: 6.5 % (ref 4.2–6.3)
HCT VFR BLD AUTO: 40.8 % (ref 36.5–49.3)
HDLC SERPL-MCNC: 46 MG/DL
HGB BLD-MCNC: 11.5 G/DL (ref 12–17)
IMM GRANULOCYTES # BLD AUTO: 0.01 THOUSAND/UL (ref 0–0.2)
IMM GRANULOCYTES NFR BLD AUTO: 0 % (ref 0–2)
LDLC SERPL CALC-MCNC: 42 MG/DL (ref 0–100)
LYMPHOCYTES # BLD AUTO: 1.7 THOUSANDS/ΜL (ref 0.6–4.47)
LYMPHOCYTES NFR BLD AUTO: 27 % (ref 14–44)
MCH RBC QN AUTO: 22 PG (ref 26.8–34.3)
MCHC RBC AUTO-ENTMCNC: 28.2 G/DL (ref 31.4–37.4)
MCV RBC AUTO: 78 FL (ref 82–98)
MONOCYTES # BLD AUTO: 0.55 THOUSAND/ΜL (ref 0.17–1.22)
MONOCYTES NFR BLD AUTO: 9 % (ref 4–12)
NEUTROPHILS # BLD AUTO: 3.9 THOUSANDS/ΜL (ref 1.85–7.62)
NEUTS SEG NFR BLD AUTO: 60 % (ref 43–75)
NONHDLC SERPL-MCNC: 54 MG/DL
NRBC BLD AUTO-RTO: 0 /100 WBCS
PLATELET # BLD AUTO: 172 THOUSANDS/UL (ref 149–390)
PMV BLD AUTO: 10.5 FL (ref 8.9–12.7)
POTASSIUM SERPL-SCNC: 3.8 MMOL/L (ref 3.5–5.3)
PROT SERPL-MCNC: 7.6 G/DL (ref 6.4–8.2)
RBC # BLD AUTO: 5.23 MILLION/UL (ref 3.88–5.62)
SODIUM SERPL-SCNC: 143 MMOL/L (ref 136–145)
T4 FREE SERPL-MCNC: 1.07 NG/DL (ref 0.76–1.46)
TRIGL SERPL-MCNC: 58 MG/DL
TSH SERPL DL<=0.05 MIU/L-ACNC: 6.74 UIU/ML (ref 0.36–3.74)
WBC # BLD AUTO: 6.39 THOUSAND/UL (ref 4.31–10.16)

## 2019-12-03 PROCEDURE — 80061 LIPID PANEL: CPT

## 2019-12-03 PROCEDURE — 84443 ASSAY THYROID STIM HORMONE: CPT

## 2019-12-03 PROCEDURE — 83036 HEMOGLOBIN GLYCOSYLATED A1C: CPT

## 2019-12-03 PROCEDURE — 85025 COMPLETE CBC W/AUTO DIFF WBC: CPT

## 2019-12-03 PROCEDURE — 84439 ASSAY OF FREE THYROXINE: CPT

## 2019-12-03 PROCEDURE — 80053 COMPREHEN METABOLIC PANEL: CPT

## 2019-12-03 PROCEDURE — 36415 COLL VENOUS BLD VENIPUNCTURE: CPT

## 2019-12-10 ENCOUNTER — OFFICE VISIT (OUTPATIENT)
Dept: INTERNAL MEDICINE CLINIC | Facility: CLINIC | Age: 84
End: 2019-12-10
Payer: MEDICARE

## 2019-12-10 VITALS
HEART RATE: 64 BPM | DIASTOLIC BLOOD PRESSURE: 78 MMHG | SYSTOLIC BLOOD PRESSURE: 122 MMHG | WEIGHT: 188 LBS | BODY MASS INDEX: 26.98 KG/M2 | OXYGEN SATURATION: 98 %

## 2019-12-10 DIAGNOSIS — E03.8 SUBCLINICAL HYPOTHYROIDISM: ICD-10-CM

## 2019-12-10 DIAGNOSIS — N18.9 CHRONIC KIDNEY DISEASE, UNSPECIFIED CKD STAGE: Chronic | ICD-10-CM

## 2019-12-10 DIAGNOSIS — I50.32 CHRONIC DIASTOLIC CONGESTIVE HEART FAILURE (HCC): ICD-10-CM

## 2019-12-10 DIAGNOSIS — J44.9 CHRONIC OBSTRUCTIVE PULMONARY DISEASE, UNSPECIFIED COPD TYPE (HCC): ICD-10-CM

## 2019-12-10 DIAGNOSIS — G47.00 INSOMNIA, UNSPECIFIED TYPE: ICD-10-CM

## 2019-12-10 DIAGNOSIS — I48.91 ATRIAL FIBRILLATION, UNSPECIFIED TYPE (HCC): Chronic | ICD-10-CM

## 2019-12-10 DIAGNOSIS — I21.4 NSTEMI (NON-ST ELEVATED MYOCARDIAL INFARCTION) (HCC): ICD-10-CM

## 2019-12-10 DIAGNOSIS — E11.620 TYPE 2 DIABETES MELLITUS WITH DIABETIC DERMATITIS, WITHOUT LONG-TERM CURRENT USE OF INSULIN (HCC): Primary | ICD-10-CM

## 2019-12-10 PROCEDURE — 99214 OFFICE O/P EST MOD 30 MIN: CPT | Performed by: NURSE PRACTITIONER

## 2019-12-10 RX ORDER — MIRTAZAPINE 7.5 MG/1
7.5 TABLET, FILM COATED ORAL
Qty: 30 TABLET | Refills: 5 | Status: SHIPPED | OUTPATIENT
Start: 2019-12-10

## 2019-12-10 NOTE — PROGRESS NOTES
Assessment/Plan:    Patient Instructions    Chronic venous insufficiency lower extremity ulcerations following with wound care currently has Unna boots elevation recommended continue Lasix potassium     COPD stable without flare     kyphosis just encouraged trying to lean back in his chair offered physical therapy which was declined     coronary artery disease stable without angina     subclinical hypothyroidism  No indication for treatment     type 2 diabetes mellitus stable on daily metformin A1c at goal     declines flu shot and pneumonia shots     follow back 6 months sooner if needed     insomnia add mirtazapine 7 5 mg at night         Diagnoses and all orders for this visit:    Type 2 diabetes mellitus with diabetic dermatitis, without long-term current use of insulin ([facility])  -     CBC and differential; Future  -     Comprehensive metabolic panel; Future  -     Hemoglobin A1C; Future  -     Microalbumin / creatinine urine ratio; Future    Chronic obstructive pulmonary disease, unspecified COPD type ([facility] )    Atrial fibrillation, unspecified type ([facility])    Chronic diastolic congestive heart failure ([facility])    NSTEMI (non-ST elevated myocardial infarction) ([facility] )  -     Lipid panel; Future    Chronic kidney disease, unspecified CKD stage    Subclinical hypothyroidism  -     TSH, 3rd generation with Free T4 reflex; Future    Insomnia, unspecified type  -     mirtazapine (REMERON) 7 5 MG tablet;  Take 1 tablet (7 5 mg total) by mouth daily at bedtime    Other orders  -     Cancel: Microalbumin / creatinine urine ratio         Subjective:      Patient ID: Migdalia Siegel is a 81 y o  male     Patient is here with his daughter   living at [facility] twice a day generally less than 150   walks with a walker very kyphotic lower lid on I eyes are swollen this is chronic   no chest pain occasional shortness of breath occasional cough   can't sleep        Current Outpatient Medications:     fluticasone-salmeterol (ADVAIR DISKUS) 250-50 mcg/dose inhaler, Inhale 1 puff 2 (two) times a day for 30 days, Disp: 5 Inhaler, Rfl: 2    furosemide (LASIX) 40 mg tablet, Take 1 tablet (40 mg total) by mouth daily, Disp: , Rfl:     Glucose Blood (FREESTYLE LITE TEST VI), by In Vitro route 2 (two) times a day, Disp: , Rfl:     ipratropium-albuterol (DUO-NEB) 0 5-2 5 mg/3 mL nebulizer solution, INHALE 3 ML 4 TIMES A DAY BY NEBULIZATION ROUTE AS NEEDED , Disp: , Rfl: 3    Lancets (FREESTYLE) lancets, FreeStyle Lancets Miscellaneous TEST 1 QD  Quantity: 1;  Refills: 5   Nneka Dowling ;  Start 15-Ben-2016 Active 100 Miscellaneous Box, Disp: , Rfl:     Lancets (FREESTYLE) lancets, by Does not apply route daily, Disp: , Rfl:     metFORMIN (GLUCOPHAGE-XR) 500 mg 24 hr tablet, Take 1 tablet (500 mg total) by mouth daily with breakfast, Disp: 90 tablet, Rfl: 3    metoprolol succinate (TOPROL-XL) 25 mg 24 hr tablet, TAKE 1 TABLET BY MOUTH EVERY DAY, Disp: 90 tablet, Rfl: 3    omeprazole (PriLOSEC) 20 mg delayed release capsule, TAKE 1 CAPSULE EVERY DAY, Disp: 90 capsule, Rfl: 3    potassium chloride (KLOR-CON M20) 20 mEq tablet, Take 1 tablet (20 mEq total) by mouth daily, Disp: , Rfl:     rivaroxaban (XARELTO) 15 mg tablet, Take 1 tablet (15 mg total) by mouth daily, Disp: 90 tablet, Rfl: 3    simvastatin (ZOCOR) 20 mg tablet, TAKE 1 TABLET BY MOUTH EVERY DAY, Disp: 90 tablet, Rfl: 0    tamsulosin (FLOMAX) 0 4 mg, TAKE 1 CAPSULE BY MOUTH DAILY, Disp: 90 capsule, Rfl: 3    magnesium chloride-calcium (SLOW-MAG) 71 5-119 mg, Take by mouth , Disp: , Rfl:     mirtazapine (REMERON) 7 5 MG tablet, Take 1 tablet (7 5 mg total) by mouth daily at bedtime, Disp: 30 tablet, Rfl: 5    tobramycin (TOBREX) 0 3 % OINT, Administer 0 5 inches to both eyes 3 (three) times a day (Patient not taking: Reported on 12/10/2019), Disp: 3 5 g, Rfl: 9    Recent Results (from the past 1008 hour(s))   CBC and differential    Collection Time: 12/03/19  1:23 PM   Result Value Ref Range    WBC 6 39 4 31 - 10 16 Thousand/uL    RBC 5 23 3 88 - 5 62 Million/uL    Hemoglobin 11 5 (L) 12 0 - 17 0 g/dL    Hematocrit 40 8 36 5 - 49 3 %    MCV 78 (L) 82 - 98 fL    MCH 22 0 (L) 26 8 - 34 3 pg    MCHC 28 2 (L) 31 4 - 37 4 g/dL    RDW 18 8 (H) 11 6 - 15 1 %    MPV 10 5 8 9 - 12 7 fL    Platelets 188 771 - 427 Thousands/uL    nRBC 0 /100 WBCs    Neutrophils Relative 60 43 - 75 %    Immat GRANS % 0 0 - 2 %    Lymphocytes Relative 27 14 - 44 %    Monocytes Relative 9 4 - 12 %    Eosinophils Relative 3 0 - 6 %    Basophils Relative 1 0 - 1 %    Neutrophils Absolute 3 90 1 85 - 7 62 Thousands/µL    Immature Grans Absolute 0 01 0 00 - 0 20 Thousand/uL    Lymphocytes Absolute 1 70 0 60 - 4 47 Thousands/µL    Monocytes Absolute 0 55 0 17 - 1 22 Thousand/µL    Eosinophils Absolute 0 16 0 00 - 0 61 Thousand/µL    Basophils Absolute 0 07 0 00 - 0 10 Thousands/µL   Comprehensive metabolic panel    Collection Time: 12/03/19  1:23 PM   Result Value Ref Range    Sodium 143 136 - 145 mmol/L    Potassium 3 8 3 5 - 5 3 mmol/L    Chloride 109 (H) 100 - 108 mmol/L    CO2 29 21 - 32 mmol/L    ANION GAP 5 4 - 13 mmol/L    BUN 18 5 - 25 mg/dL    Creatinine 1 22 0 60 - 1 30 mg/dL    Glucose, Fasting 95 65 - 99 mg/dL    Calcium 8 9 8 3 - 10 1 mg/dL    AST 12 5 - 45 U/L    ALT 11 (L) 12 - 78 U/L    Alkaline Phosphatase 101 46 - 116 U/L    Total Protein 7 6 6 4 - 8 2 g/dL    Albumin 3 1 (L) 3 5 - 5 0 g/dL    Total Bilirubin 0 91 0 20 - 1 00 mg/dL    eGFR 54 ml/min/1 73sq m   Lipid panel    Collection Time: 12/03/19  1:23 PM   Result Value Ref Range    Cholesterol 100 50 - 200 mg/dL    Triglycerides 58 <=150 mg/dL    HDL, Direct 46 >=40 mg/dL    LDL Calculated 42 0 - 100 mg/dL    Non-HDL-Chol (CHOL-HDL) 54 mg/dl   Hemoglobin A1C    Collection Time: 12/03/19  1:23 PM   Result Value Ref Range    Hemoglobin A1C 6 5 (H) 4 2 - 6 3 %     mg/dl   TSH, 3rd generation with Free T4 reflex    Collection Time: 12/03/19  1:23 PM   Result Value Ref Range    TSH 3RD GENERATON 6 740 (H) 0 358 - 3 740 uIU/mL   T4, free    Collection Time: 12/03/19  1:23 PM   Result Value Ref Range    Free T4 1 07 0 76 - 1 46 ng/dL       The following portions of the patient's history were reviewed and updated as appropriate: allergies, current medications, past family history, past medical history, past social history, past surgical history and problem list      Review of Systems   Constitutional: Negative for appetite change, chills, diaphoresis, fatigue, fever and unexpected weight change  HENT: Negative for postnasal drip and sneezing  Eyes: Positive for redness  Negative for visual disturbance  Respiratory: Negative for chest tightness and shortness of breath  Cardiovascular: Negative for chest pain, palpitations and leg swelling  Gastrointestinal: Negative for abdominal pain and blood in stool  Endocrine: Negative for cold intolerance, heat intolerance, polydipsia, polyphagia and polyuria  Genitourinary: Negative for difficulty urinating, dysuria, frequency and urgency  Musculoskeletal: Negative for arthralgias and myalgias  Skin: Negative for rash and wound  Neurological: Negative for dizziness, weakness, light-headedness and headaches  Hematological: Negative for adenopathy  Psychiatric/Behavioral: Negative for confusion, dysphoric mood and sleep disturbance  The patient is not nervous/anxious  Objective:      /78 (BP Location: Left arm, Patient Position: Sitting, Cuff Size: Standard)   Pulse 64   Wt 85 3 kg (188 lb) Comment: w/ shoes denied off  SpO2 98%   BMI 26 98 kg/m²        Physical Exam   Constitutional: He is oriented to person, place, and time  He appears well-developed  No distress  HENT:   Head: Normocephalic and atraumatic  Nose: Nose normal    Mouth/Throat: Oropharynx is clear and moist    Eyes: Pupils are equal, round, and reactive to light   Conjunctivae and EOM are normal  Neck: Normal range of motion  Neck supple  No JVD present  No tracheal deviation present  No thyromegaly present  Cardiovascular: Normal rate, regular rhythm and intact distal pulses  Exam reveals no gallop and no friction rub  Murmur heard  Pulmonary/Chest: Effort normal and breath sounds normal  No respiratory distress  He has no wheezes  He has no rales  Abdominal: Soft  Bowel sounds are normal  He exhibits no distension  There is no tenderness  Musculoskeletal: Normal range of motion  He exhibits edema  Lymphadenopathy:     He has no cervical adenopathy  Neurological: He is alert and oriented to person, place, and time  No cranial nerve deficit  Skin: Skin is warm and dry  No rash noted  He is not diaphoretic  Psychiatric: He has a normal mood and affect  His behavior is normal  Judgment and thought content normal    BMI Counseling: Body mass index is 26 98 kg/m²  The BMI is above normal  Nutrition recommendations include decreasing portion sizes, consuming healthier snacks and limiting drinks that contain sugar  BMI Counseling: Body mass index is 26 98 kg/m²  Follow-up plan was not completed due to elderly patient (73 years old) where weight reduction/weight gain would complicate underlying health condition such as: illness or physical disability

## 2019-12-10 NOTE — PROGRESS NOTES
Assessment/Plan:    There are no Patient Instructions on file for this visit         Diagnoses and all orders for this visit:    Type 2 diabetes mellitus with diabetic dermatitis, without long-term current use of insulin ([facility] )    Other orders  -     Cancel: Microalbumin / creatinine urine ratio         Subjective:      Patient ID: Nikos Granda is a 81 y o  male    HPI      Current Outpatient Medications:     alendronate (FOSAMAX) 70 mg tablet, TAKE 1 TABLET (70 MG TOTAL) BY MOUTH EVERY 7 DAYS, Disp: 12 tablet, Rfl: 1    atorvastatin (LIPITOR) 20 mg tablet, Take 20 mg by mouth daily , Disp: , Rfl:     fluticasone-salmeterol (ADVAIR DISKUS) 250-50 mcg/dose inhaler, Inhale 1 puff 2 (two) times a day for 30 days, Disp: 5 Inhaler, Rfl: 2    furosemide (LASIX) 40 mg tablet, Take 1 tablet (40 mg total) by mouth daily, Disp: , Rfl:     Glucose Blood (FREESTYLE LITE TEST VI), by In Vitro route 2 (two) times a day, Disp: , Rfl:     ipratropium-albuterol (DUO-NEB) 0 5-2 5 mg/3 mL nebulizer solution, INHALE 3 ML 4 TIMES A DAY BY NEBULIZATION ROUTE AS NEEDED , Disp: , Rfl: 3    Lancets (FREESTYLE) lancets, FreeStyle Lancets Miscellaneous TEST 1 QD  Quantity: 1;  Refills: 5   Degler M ROYCE , Diana Bence ;  Start 15-Ben-2016 Active 100 Miscellaneous Box, Disp: , Rfl:     Lancets (FREESTYLE) lancets, by Does not apply route daily, Disp: , Rfl:     metFORMIN (GLUCOPHAGE-XR) 500 mg 24 hr tablet, Take 1 tablet (500 mg total) by mouth daily with breakfast, Disp: 90 tablet, Rfl: 3    metoprolol succinate (TOPROL-XL) 25 mg 24 hr tablet, TAKE 1 TABLET BY MOUTH EVERY DAY, Disp: 90 tablet, Rfl: 3    omeprazole (PriLOSEC) 20 mg delayed release capsule, TAKE 1 CAPSULE EVERY DAY, Disp: 90 capsule, Rfl: 3    potassium chloride (KLOR-CON M20) 20 mEq tablet, Take 1 tablet (20 mEq total) by mouth daily, Disp: , Rfl:     rivaroxaban (XARELTO) 15 mg tablet, Take 1 tablet (15 mg total) by mouth daily, Disp: 90 tablet, Rfl: 3    simvastatin (ZOCOR) 20 mg tablet, TAKE 1 TABLET BY MOUTH EVERY DAY, Disp: 90 tablet, Rfl: 0    tamsulosin (FLOMAX) 0 4 mg, TAKE 1 CAPSULE BY MOUTH DAILY, Disp: 90 capsule, Rfl: 3    magnesium chloride-calcium (SLOW-MAG) 71 5-119 mg, Take by mouth , Disp: , Rfl:     metFORMIN (GLUCOPHAGE-XR) 500 mg 24 hr tablet, TAKE 1 TABLET BY MOUTH TWICE A DAY (Patient not taking: Reported on 12/10/2019), Disp: 90 tablet, Rfl: 3    tobramycin (TOBREX) 0 3 % OINT, Administer 0 5 inches to both eyes 3 (three) times a day (Patient not taking: Reported on 12/10/2019), Disp: 3 5 g, Rfl: 9    Recent Results (from the past 1008 hour(s))   CBC and differential    Collection Time: 12/03/19  1:23 PM   Result Value Ref Range    WBC 6 39 4 31 - 10 16 Thousand/uL    RBC 5 23 3 88 - 5 62 Million/uL    Hemoglobin 11 5 (L) 12 0 - 17 0 g/dL    Hematocrit 40 8 36 5 - 49 3 %    MCV 78 (L) 82 - 98 fL    MCH 22 0 (L) 26 8 - 34 3 pg    MCHC 28 2 (L) 31 4 - 37 4 g/dL    RDW 18 8 (H) 11 6 - 15 1 %    MPV 10 5 8 9 - 12 7 fL    Platelets 692 841 - 679 Thousands/uL    nRBC 0 /100 WBCs    Neutrophils Relative 60 43 - 75 %    Immat GRANS % 0 0 - 2 %    Lymphocytes Relative 27 14 - 44 %    Monocytes Relative 9 4 - 12 %    Eosinophils Relative 3 0 - 6 %    Basophils Relative 1 0 - 1 %    Neutrophils Absolute 3 90 1 85 - 7 62 Thousands/µL    Immature Grans Absolute 0 01 0 00 - 0 20 Thousand/uL    Lymphocytes Absolute 1 70 0 60 - 4 47 Thousands/µL    Monocytes Absolute 0 55 0 17 - 1 22 Thousand/µL    Eosinophils Absolute 0 16 0 00 - 0 61 Thousand/µL    Basophils Absolute 0 07 0 00 - 0 10 Thousands/µL   Comprehensive metabolic panel    Collection Time: 12/03/19  1:23 PM   Result Value Ref Range    Sodium 143 136 - 145 mmol/L    Potassium 3 8 3 5 - 5 3 mmol/L    Chloride 109 (H) 100 - 108 mmol/L    CO2 29 21 - 32 mmol/L    ANION GAP 5 4 - 13 mmol/L    BUN 18 5 - 25 mg/dL    Creatinine 1 22 0 60 - 1 30 mg/dL    Glucose, Fasting 95 65 - 99 mg/dL    Calcium 8 9 8 3 - 10 1 mg/dL    AST 12 5 - 45 U/L    ALT 11 (L) 12 - 78 U/L    Alkaline Phosphatase 101 46 - 116 U/L    Total Protein 7 6 6 4 - 8 2 g/dL    Albumin 3 1 (L) 3 5 - 5 0 g/dL    Total Bilirubin 0 91 0 20 - 1 00 mg/dL    eGFR 54 ml/min/1 73sq m   Lipid panel    Collection Time: 12/03/19  1:23 PM   Result Value Ref Range    Cholesterol 100 50 - 200 mg/dL    Triglycerides 58 <=150 mg/dL    HDL, Direct 46 >=40 mg/dL    LDL Calculated 42 0 - 100 mg/dL    Non-HDL-Chol (CHOL-HDL) 54 mg/dl   Hemoglobin A1C    Collection Time: 12/03/19  1:23 PM   Result Value Ref Range    Hemoglobin A1C 6 5 (H) 4 2 - 6 3 %     mg/dl   TSH, 3rd generation with Free T4 reflex    Collection Time: 12/03/19  1:23 PM   Result Value Ref Range    TSH 3RD GENERATON 6 740 (H) 0 358 - 3 740 uIU/mL   T4, free    Collection Time: 12/03/19  1:23 PM   Result Value Ref Range    Free T4 1 07 0 76 - 1 46 ng/dL       The following portions of the patient's history were reviewed and updated as appropriate: allergies, current medications, past family history, past medical history, past social history, past surgical history and problem list      Review of Systems      Objective:      /78 (BP Location: Left arm, Patient Position: Sitting, Cuff Size: Standard)   Pulse 64   Wt 85 3 kg (188 lb) Comment: w/ shoes denied off  SpO2 98%   BMI 26 98 kg/m²

## 2019-12-10 NOTE — PATIENT INSTRUCTIONS
Chronic venous insufficiency lower extremity ulcerations following with wound care currently has Unna boots elevation recommended continue Lasix potassium     COPD stable without flare     kyphosis just encouraged trying to lean back in his chair offered physical therapy which was declined     coronary artery disease stable without angina     subclinical hypothyroidism  No indication for treatment     type 2 diabetes mellitus stable on daily metformin A1c at goal     declines flu shot and pneumonia shots     follow back 6 months sooner if needed     insomnia add mirtazapine 7 5 mg at night

## 2020-01-19 DIAGNOSIS — E78.2 MIXED HYPERLIPIDEMIA: ICD-10-CM

## 2020-01-20 RX ORDER — SIMVASTATIN 20 MG
TABLET ORAL
Qty: 90 TABLET | Refills: 0 | Status: SHIPPED | OUTPATIENT
Start: 2020-01-20

## 2020-02-11 ENCOUNTER — TELEPHONE (OUTPATIENT)
Dept: INTERNAL MEDICINE CLINIC | Facility: CLINIC | Age: 85
End: 2020-02-11

## 2020-02-11 NOTE — TELEPHONE ENCOUNTER
Patient's daughter Gabriella Hoskins calling ,he was discharge from nursing home today 2/11/2020     With medication for simvastatin (ZOCOR) 10 mg tablet        but he normally takes 20 mg , would like to know if he should be taking 10 mg or 20 mg   and if 10 he needs new RX for 10 mg

## 2020-02-13 ENCOUNTER — TELEPHONE (OUTPATIENT)
Dept: INTERNAL MEDICINE CLINIC | Facility: CLINIC | Age: 85
End: 2020-02-13

## 2020-02-13 NOTE — TELEPHONE ENCOUNTER
Jimena Chatman called from residential home health care regarding this patient  She wants an [id number] by Dr Igor Julian to continue PT with the patient  Twice in 1 week and 2 week once's a week  Patient also needs an appointment he was discharged form [facility] fell at home, admitted Feburary 2nd and discharged Tuesday Feb 11th      Jimena Chatman [phone]

## 2020-02-13 NOTE — TELEPHONE ENCOUNTER
Patient's daughter Edilson Barnes calling his blood sugar is low 70, she things its becaouse of the METFORMIN       Wants to stop the metformin      You can leave a detailed message on voice mail

## 2020-02-14 ENCOUNTER — OFFICE VISIT (OUTPATIENT)
Dept: DERMATOLOGY | Facility: CLINIC | Age: 85
End: 2020-02-14
Payer: MEDICARE

## 2020-02-14 DIAGNOSIS — I87.2 STASIS DERMATITIS OF BOTH LEGS: Primary | ICD-10-CM

## 2020-02-14 PROCEDURE — 3066F NEPHROPATHY DOC TX: CPT | Performed by: DERMATOLOGY

## 2020-02-14 PROCEDURE — 99213 OFFICE O/P EST LOW 20 MIN: CPT | Performed by: DERMATOLOGY

## 2020-02-14 PROCEDURE — 4040F PNEUMOC VAC/ADMIN/RCVD: CPT | Performed by: DERMATOLOGY

## 2020-02-14 PROCEDURE — 1036F TOBACCO NON-USER: CPT | Performed by: DERMATOLOGY

## 2020-02-14 PROCEDURE — 3074F SYST BP LT 130 MM HG: CPT | Performed by: DERMATOLOGY

## 2020-02-14 PROCEDURE — 3078F DIAST BP <80 MM HG: CPT | Performed by: DERMATOLOGY

## 2020-02-14 NOTE — TELEPHONE ENCOUNTER
Metformin alone should not cause hypoglycemia, but ok to stop it and monitor sugars  Was he having any symptoms?

## 2020-02-14 NOTE — PROGRESS NOTES
[facility]  [address] [place] [id number]  [phone]  [phone]     MRN: [id number] : 1935  Encounter: [id number]  Patient Information: Jluis Woodruff  Chief complaint:  Ulceration on legs    History of present illness:  41-year-old male presents concerns regarding ulceration on his left leg patient with known history of multiple skin cancers has not seen by me for year patient has [facility] on his legs however  he was recently discharged from there back to home and now presents to me      Past Medical History:   Diagnosis Date    Abdominal aortic aneurysm (AAA) (HCC)     Anticoagulant long-term use     Cardiac disease     CHF (congestive heart failure) (HCC)     Chronic kidney disease     COPD (chronic obstructive pulmonary disease) (HCC)     Diabetes mellitus (HCC)     Disorder of upper respiratory tract     GERD (gastroesophageal reflux disease)     Hyperlipidemia     Hypertension     Morbid (severe) obesity due to excess calories ([address] )     Nonmelanoma skin cancer     last assessed 17     Osteoporosis     [facility], [facility]     Pulmonary nodules 2017    Renal disorder     Renal failure     last assessed 14    Renal failure     last assessed 14      Past Surgical History:   Procedure Laterality Date    CORONARY ANGIOPLASTY      CORONARY ANGIOPLASTY WITH STENT PLACEMENT      EYE SURGERY       Social History   Social History     Substance and Sexual Activity   Alcohol Use No     Social History     Substance and Sexual Activity   Drug Use No     Social History     Tobacco Use   Smoking Status Former Smoker    Packs/day: 2 00    Years: 40 00    Pack years: 80 00    Types: Cigarettes    Last attempt to quit: 12/10/1999    Years since quittin 1   Smokeless Tobacco Never Used     Family History Problem Relation Age of Onset    Heart attack Mother         acute    Heart attack Father         acute    Emphysema Father     Hypertension Father      Meds/Allergies   Allergies   Allergen Reactions    Cephalexin Other (See Comments)     Per pt does NOT remember type of reaction or severity level       Meds:  Prior to Admission medications    Medication Sig Start Date End Date Taking?  Authorizing Provider   fluticasone-salmeterol (ADVAIR DISKUS) 250-50 mcg/dose inhaler Inhale 1 puff 2 (two) times a day for 30 days 12/7/18 12/10/19  Carlos Manuel Waller MD   furosemide (LASIX) 40 mg tablet Take 1 tablet (40 mg total) by mouth daily 2/8/19   Carlos Manuel Waller MD   Glucose Blood (FREESTYLE LITE TEST VI) by In Vitro route 2 (two) times a day 1/15/16   Historical Provider, MD   ipratropium-albuterol (DUO-NEB) 0 5-2 5 mg/3 mL nebulizer solution INHALE 3 ML 4 TIMES A DAY BY NEBULIZATION ROUTE AS NEEDED  3/8/19   Historical Provider, MD   Lancets (FREESTYLE) lancets FreeStyle Lancets Miscellaneous  TEST 1 QD   Quantity: 1;  Refills: 5      Leonila Dowling ;  Start 15-Ben-2016  Active  100 Miscellaneous Box 1/15/16   Historical Provider, MD   Lancets (FREESTYLE) lancets by Does not apply route daily 1/15/16   Historical Provider, MD   magnesium chloride-calcium (SLOW-MAG) 71 5-119 mg Take by mouth  11/7/16   Historical Provider, MD   metFORMIN (GLUCOPHAGE-XR) 500 mg 24 hr tablet Take 1 tablet (500 mg total) by mouth daily with breakfast 4/24/19   Macy White PA-C   metoprolol succinate (TOPROL-XL) 25 mg 24 hr tablet TAKE 1 TABLET BY MOUTH EVERY DAY 8/23/18   Carlos Manuel Waller MD   mirtazapine (REMERON) 7 5 MG tablet Take 1 tablet (7 5 mg total) by mouth daily at bedtime 12/10/19   BHANU Veronica   omeprazole (PriLOSEC) 20 mg delayed release capsule TAKE 1 CAPSULE EVERY DAY 5/20/18   Carlos Manuel Waller MD   potassium chloride (KLOR-CON M20) 20 mEq tablet Take 1 tablet (20 mEq total) by mouth daily 2/8/19   Leonila Philip MD Tiff   rivaroxaban (XARELTO) 15 mg tablet Take 1 tablet (15 mg total) by mouth daily 6/10/19   BHANU Rosado   simvastatin (ZOCOR) 20 mg tablet TAKE 1 TABLET BY MOUTH EVERY DAY 1/20/20   BHANU Rosado   tamsulosin (FLOMAX) 0 4 mg TAKE 1 CAPSULE BY MOUTH DAILY 1/9/19   Felicia Shone, MD   tobramycin (TOBREX) 0 3 % OINT Administer 0 5 inches to both eyes 3 (three) times a day  Patient not taking: Reported on 12/10/2019 4/24/19   Duane Cortez PA-C       Subjective:     Review of Systems:    General: negative for - chills, fatigue, fever,  weight gain or weight loss  Psychological: negative for - anxiety, behavioral disorder, concentration difficulties, decreased libido, depression, irritability, memory difficulties, mood swings, sleep disturbances or suicidal ideation  ENT: negative for - hearing difficulties , nasal congestion, nasal discharge, oral lesions, sinus pain, sneezing, sore throat  Allergy and Immunology: negative for - hives, insect bite sensitivity,  Hematological and Lymphatic: negative for - bleeding problems, blood clots,bruising, swollen lymph nodes  Endocrine: negative for - hair pattern changes, hot flashes, malaise/lethargy, mood swings, palpitations, polydipsia/polyuria, skin changes, temperature intolerance or unexpected weight change  Respiratory: negative for - cough, hemoptysis, orthopnea, shortness of breath, or wheezing  Cardiovascular: negative for - chest pain, dyspnea on exertion, edema,  Gastrointestinal: negative for - abdominal pain, nausea/vomiting  Genito-Urinary: negative for - dysuria, incontinence, irregular/heavy menses or urinary frequency/urgency  Musculoskeletal: negative for - gait disturbance, joint pain, joint stiffness, joint swelling, muscle pain, muscular weakness  Dermatological:  As in HPI  Neurological: negative for confusion, dizziness, headaches, impaired coordination/balance, memory loss, numbness/tingling, seizures, speech problems, tremors or weakness       Objective: There were no vitals taken for this visit  Physical Exam:    General Appearance:    Alert, cooperative, no distress   Head:    Normocephalic, without obvious abnormality, atraumatic           Skin:   A full skin exam was performed including scalp, head scalp, eyes, ears, nose, lips, neck, chest, axilla, abdomen, back, buttocks, bilateral upper extremities, bilateral lower extremities, hands, feet, fingers, toes, fingernails, and toenails small superficial erosion noted on the left lower leg also and hemorrhagic eschar noted on the right big toe pulses are diminished     Assessment:     1  Stasis dermatitis of both legs           Plan:   Minimal ulceration placed an Allevyn dressing on the area on hopefully this will resolve quickly the toe area of is question of peripheral vascular disease with the patient is to see podiatrist about this    Francisca Lawson MD  6/37/9142,11:56 AM    Portions of the record may have been created with voice recognition software   Occasional wrong word or "sound a like" substitutions may have occurred due to the inherent limitations of voice recognition software   Read the chart carefully and recognize, using context, where substitutions have occurred

## 2020-02-14 NOTE — PATIENT INSTRUCTIONS
Minimal ulceration placed an Allevyn dressing on the area on hopefully this will resolve quickly the toe area of is question of peripheral vascular disease with the patient is to see podiatrist about this

## 2020-02-18 NOTE — TELEPHONE ENCOUNTER
Informed patient's daughter, okay as per consent  They will stop the medication and monitor his sugars          She stated that the patient was not having any symptoms

## 2020-02-25 ENCOUNTER — TELEPHONE (OUTPATIENT)
Dept: INTERNAL MEDICINE CLINIC | Facility: CLINIC | Age: 85
End: 2020-02-25

## 2020-02-25 NOTE — TELEPHONE ENCOUNTER
Katy Stevens from [facility]  73 called asking if they could add visits for physical therapy starting March 9th  He has not met all his physical therapy goals  They would like to do once a week for 4 more weeks         Verbal order ok     [phone]

## 2020-03-10 ENCOUNTER — TELEPHONE (OUTPATIENT)
Dept: INTERNAL MEDICINE CLINIC | Facility: CLINIC | Age: 85
End: 2020-03-10

## 2020-03-10 NOTE — TELEPHONE ENCOUNTER
Delaney Wintersing from [facility] called, she was supposed to to a physical therapy re-eval today  No answer at his door or on the phone  According to the therapy supervisor at [facility], he was sent to [facility]? She cannot verify with the supervisor because she is out of the office  Needs clarification      Delaney Shields   [phone]

## 2020-03-10 NOTE — TELEPHONE ENCOUNTER
Called [phone]   Listed as a number for Gabriella Hoskins on consent      Its her husbands number & he did confirm Lilia Abrams is in [place]

## 2020-03-10 NOTE — TELEPHONE ENCOUNTER
RETURNING     OUR  CALL  DAUGHTER   NORA   SHE  IS  GOING  TO  WORK  NOW  SO  SHE  WANTS  YOU  TO  LEAVE DETAILED    MESSAGE  FOR  HER     [phone]

## 2020-05-01 ENCOUNTER — TELEPHONE (OUTPATIENT)
Dept: INTERNAL MEDICINE CLINIC | Facility: CLINIC | Age: 85
End: 2020-05-01

## 2024-02-21 PROBLEM — J18.9 PNEUMONIA: Status: RESOLVED | Noted: 2017-12-01 | Resolved: 2024-02-21

## 2024-02-21 PROBLEM — Z13.89 SCREENING FOR SKIN CONDITION: Status: RESOLVED | Noted: 2018-11-06 | Resolved: 2024-02-21

## 2024-02-21 PROBLEM — R05.9 COUGH: Status: RESOLVED | Noted: 2017-11-29 | Resolved: 2024-02-21

## 2024-04-16 ENCOUNTER — TELEPHONE (OUTPATIENT)
Age: 89
End: 2024-04-16